# Patient Record
Sex: FEMALE | Race: WHITE | NOT HISPANIC OR LATINO | Employment: OTHER | URBAN - METROPOLITAN AREA
[De-identification: names, ages, dates, MRNs, and addresses within clinical notes are randomized per-mention and may not be internally consistent; named-entity substitution may affect disease eponyms.]

---

## 2017-01-10 ENCOUNTER — HOSPITAL ENCOUNTER (OUTPATIENT)
Dept: RADIOLOGY | Facility: HOSPITAL | Age: 82
Discharge: HOME | End: 2017-01-10
Attending: ORTHOPAEDIC SURGERY
Payer: MEDICARE

## 2017-01-10 ENCOUNTER — ALLSCRIPTS OFFICE VISIT (OUTPATIENT)
Dept: OTHER | Facility: OTHER | Age: 82
End: 2017-01-10

## 2017-01-10 ENCOUNTER — TRANSCRIBE ORDERS (OUTPATIENT)
Dept: ADMINISTRATIVE | Facility: HOSPITAL | Age: 82
End: 2017-01-10

## 2017-01-10 DIAGNOSIS — M16.11 PRIMARY OSTEOARTHRITIS OF RIGHT HIP: ICD-10-CM

## 2017-01-10 DIAGNOSIS — M25.551 PAIN IN RIGHT HIP: ICD-10-CM

## 2017-01-10 DIAGNOSIS — M25.551 RIGHT HIP PAIN: Primary | ICD-10-CM

## 2017-01-10 PROCEDURE — 73502 X-RAY EXAM HIP UNI 2-3 VIEWS: CPT

## 2017-01-10 PROCEDURE — 72100 X-RAY EXAM L-S SPINE 2/3 VWS: CPT

## 2017-01-31 ENCOUNTER — HOSPITAL ENCOUNTER (OUTPATIENT)
Dept: RADIOLOGY | Facility: HOSPITAL | Age: 82
Discharge: HOME | End: 2017-01-31
Payer: MEDICARE

## 2017-01-31 DIAGNOSIS — M16.11 PRIMARY OSTEOARTHRITIS OF RIGHT HIP: ICD-10-CM

## 2017-01-31 PROCEDURE — 20610 DRAIN/INJ JOINT/BURSA W/O US: CPT

## 2017-01-31 PROCEDURE — 77002 NEEDLE LOCALIZATION BY XRAY: CPT

## 2017-01-31 RX ORDER — LIDOCAINE HYDROCHLORIDE 10 MG/ML
20 INJECTION, SOLUTION INFILTRATION; PERINEURAL
Status: COMPLETED | OUTPATIENT
Start: 2017-01-31 | End: 2017-01-31

## 2017-01-31 RX ORDER — METHYLPREDNISOLONE ACETATE 80 MG/ML
80 INJECTION, SUSPENSION INTRA-ARTICULAR; INTRALESIONAL; INTRAMUSCULAR; SOFT TISSUE
Status: COMPLETED | OUTPATIENT
Start: 2017-01-31 | End: 2017-01-31

## 2017-01-31 RX ORDER — BUPIVACAINE HYDROCHLORIDE 2.5 MG/ML
30 INJECTION, SOLUTION EPIDURAL; INFILTRATION; INTRACAUDAL
Status: COMPLETED | OUTPATIENT
Start: 2017-01-31 | End: 2017-01-31

## 2017-01-31 RX ORDER — SODIUM BICARBONATE 42 MG/ML
2.4 INJECTION, SOLUTION INTRAVENOUS
Status: COMPLETED | OUTPATIENT
Start: 2017-01-31 | End: 2017-01-31

## 2017-01-31 RX ADMIN — LIDOCAINE HYDROCHLORIDE 2 ML: 10 INJECTION, SOLUTION INFILTRATION; PERINEURAL at 13:25

## 2017-01-31 RX ADMIN — BUPIVACAINE HYDROCHLORIDE 2 ML: 2.5 INJECTION, SOLUTION EPIDURAL; INFILTRATION; INTRACAUDAL at 13:25

## 2017-01-31 RX ADMIN — SODIUM BICARBONATE 2.4 MEQ: 42 SOLUTION INTRAVENOUS at 13:25

## 2017-01-31 RX ADMIN — METHYLPREDNISOLONE ACETATE 80 MG: 80 INJECTION, SUSPENSION INTRA-ARTICULAR; INTRALESIONAL; INTRAMUSCULAR; SOFT TISSUE at 13:25

## 2017-01-31 RX ADMIN — IOHEXOL 3 ML: 300 INJECTION, SOLUTION INTRAVENOUS at 13:25

## 2018-01-13 VITALS
BODY MASS INDEX: 23.56 KG/M2 | HEIGHT: 64 IN | WEIGHT: 138 LBS | SYSTOLIC BLOOD PRESSURE: 149 MMHG | DIASTOLIC BLOOD PRESSURE: 78 MMHG | HEART RATE: 82 BPM

## 2019-07-09 ENCOUNTER — APPOINTMENT (EMERGENCY)
Dept: RADIOLOGY | Facility: HOSPITAL | Age: 84
End: 2019-07-09
Payer: MEDICARE

## 2019-07-09 ENCOUNTER — APPOINTMENT (OUTPATIENT)
Dept: RADIOLOGY | Facility: HOSPITAL | Age: 84
End: 2019-07-09
Payer: MEDICARE

## 2019-07-09 ENCOUNTER — HOSPITAL ENCOUNTER (OUTPATIENT)
Facility: HOSPITAL | Age: 84
Setting detail: OBSERVATION
Discharge: HOME | End: 2019-07-09
Attending: EMERGENCY MEDICINE | Admitting: INTERNAL MEDICINE
Payer: MEDICARE

## 2019-07-09 VITALS
BODY MASS INDEX: 24.45 KG/M2 | SYSTOLIC BLOOD PRESSURE: 138 MMHG | OXYGEN SATURATION: 97 % | DIASTOLIC BLOOD PRESSURE: 66 MMHG | HEART RATE: 94 BPM | RESPIRATION RATE: 20 BRPM | TEMPERATURE: 98.4 F | HEIGHT: 63 IN

## 2019-07-09 DIAGNOSIS — R07.89 CHEST TIGHTNESS: Primary | ICD-10-CM

## 2019-07-09 DIAGNOSIS — I10 ESSENTIAL HYPERTENSION: ICD-10-CM

## 2019-07-09 DIAGNOSIS — R42 LIGHTHEADEDNESS: ICD-10-CM

## 2019-07-09 PROBLEM — K21.9 GERD (GASTROESOPHAGEAL REFLUX DISEASE): Status: ACTIVE | Noted: 2019-07-09

## 2019-07-09 LAB
ALBUMIN SERPL BCP-MCNC: 4 G/DL (ref 3.5–5)
ALP SERPL-CCNC: 99 U/L (ref 46–116)
ALT SERPL W P-5'-P-CCNC: 18 U/L (ref 12–78)
ANION GAP SERPL CALCULATED.3IONS-SCNC: 9 MMOL/L (ref 4–13)
AST SERPL W P-5'-P-CCNC: 14 U/L (ref 5–45)
ATRIAL RATE: 87 BPM
ATRIAL RATE: 88 BPM
ATRIAL RATE: 89 BPM
BASOPHILS # BLD AUTO: 0.07 THOUSANDS/ΜL (ref 0–0.1)
BASOPHILS NFR BLD AUTO: 1 % (ref 0–1)
BILIRUB SERPL-MCNC: 0.39 MG/DL (ref 0.2–1)
BUN SERPL-MCNC: 18 MG/DL (ref 5–25)
CALCIUM SERPL-MCNC: 9.5 MG/DL (ref 8.3–10.1)
CHLORIDE SERPL-SCNC: 105 MMOL/L (ref 100–108)
CO2 SERPL-SCNC: 25 MMOL/L (ref 21–32)
CREAT SERPL-MCNC: 0.9 MG/DL (ref 0.6–1.3)
EOSINOPHIL # BLD AUTO: 0.29 THOUSAND/ΜL (ref 0–0.61)
EOSINOPHIL NFR BLD AUTO: 2 % (ref 0–6)
ERYTHROCYTE [DISTWIDTH] IN BLOOD BY AUTOMATED COUNT: 12.4 % (ref 11.6–15.1)
GFR SERPL CREATININE-BSD FRML MDRD: 59 ML/MIN/1.73SQ M
GLUCOSE SERPL-MCNC: 116 MG/DL (ref 65–140)
HCT VFR BLD AUTO: 41.1 % (ref 34.8–46.1)
HGB BLD-MCNC: 13.7 G/DL (ref 11.5–15.4)
IMM GRANULOCYTES # BLD AUTO: 0.06 THOUSAND/UL (ref 0–0.2)
IMM GRANULOCYTES NFR BLD AUTO: 0 % (ref 0–2)
LYMPHOCYTES # BLD AUTO: 1.56 THOUSANDS/ΜL (ref 0.6–4.47)
LYMPHOCYTES NFR BLD AUTO: 12 % (ref 14–44)
MCH RBC QN AUTO: 31.1 PG (ref 26.8–34.3)
MCHC RBC AUTO-ENTMCNC: 33.3 G/DL (ref 31.4–37.4)
MCV RBC AUTO: 93 FL (ref 82–98)
MONOCYTES # BLD AUTO: 0.6 THOUSAND/ΜL (ref 0.17–1.22)
MONOCYTES NFR BLD AUTO: 5 % (ref 4–12)
NEUTROPHILS # BLD AUTO: 10.9 THOUSANDS/ΜL (ref 1.85–7.62)
NEUTS SEG NFR BLD AUTO: 80 % (ref 43–75)
NRBC BLD AUTO-RTO: 0 /100 WBCS
P AXIS: 27 DEGREES
P AXIS: 52 DEGREES
P AXIS: 53 DEGREES
PLATELET # BLD AUTO: 250 THOUSANDS/UL (ref 149–390)
PMV BLD AUTO: 10.1 FL (ref 8.9–12.7)
POTASSIUM SERPL-SCNC: 3.5 MMOL/L (ref 3.5–5.3)
PR INTERVAL: 184 MS
PR INTERVAL: 184 MS
PR INTERVAL: 188 MS
PROT SERPL-MCNC: 7.9 G/DL (ref 6.4–8.2)
QRS AXIS: -15 DEGREES
QRS AXIS: -9 DEGREES
QRS AXIS: 1 DEGREES
QRSD INTERVAL: 80 MS
QRSD INTERVAL: 84 MS
QRSD INTERVAL: 86 MS
QT INTERVAL: 344 MS
QT INTERVAL: 364 MS
QT INTERVAL: 370 MS
QTC INTERVAL: 416 MS
QTC INTERVAL: 442 MS
QTC INTERVAL: 445 MS
RBC # BLD AUTO: 4.41 MILLION/UL (ref 3.81–5.12)
SODIUM SERPL-SCNC: 139 MMOL/L (ref 136–145)
T WAVE AXIS: 11 DEGREES
T WAVE AXIS: 13 DEGREES
T WAVE AXIS: 26 DEGREES
TROPONIN I SERPL-MCNC: <0.02 NG/ML
VENTRICULAR RATE: 87 BPM
VENTRICULAR RATE: 88 BPM
VENTRICULAR RATE: 89 BPM
WBC # BLD AUTO: 13.48 THOUSAND/UL (ref 4.31–10.16)

## 2019-07-09 PROCEDURE — 71250 CT THORAX DX C-: CPT

## 2019-07-09 PROCEDURE — 1124F ACP DISCUSS-NO DSCNMKR DOCD: CPT | Performed by: EMERGENCY MEDICINE

## 2019-07-09 PROCEDURE — 99284 EMERGENCY DEPT VISIT MOD MDM: CPT | Performed by: EMERGENCY MEDICINE

## 2019-07-09 PROCEDURE — 80053 COMPREHEN METABOLIC PANEL: CPT | Performed by: EMERGENCY MEDICINE

## 2019-07-09 PROCEDURE — 99285 EMERGENCY DEPT VISIT HI MDM: CPT

## 2019-07-09 PROCEDURE — 84484 ASSAY OF TROPONIN QUANT: CPT | Performed by: STUDENT IN AN ORGANIZED HEALTH CARE EDUCATION/TRAINING PROGRAM

## 2019-07-09 PROCEDURE — 85025 COMPLETE CBC W/AUTO DIFF WBC: CPT | Performed by: EMERGENCY MEDICINE

## 2019-07-09 PROCEDURE — 71046 X-RAY EXAM CHEST 2 VIEWS: CPT

## 2019-07-09 PROCEDURE — 84484 ASSAY OF TROPONIN QUANT: CPT | Performed by: EMERGENCY MEDICINE

## 2019-07-09 PROCEDURE — 36415 COLL VENOUS BLD VENIPUNCTURE: CPT

## 2019-07-09 PROCEDURE — 93005 ELECTROCARDIOGRAM TRACING: CPT

## 2019-07-09 PROCEDURE — 93010 ELECTROCARDIOGRAM REPORT: CPT | Performed by: INTERNAL MEDICINE

## 2019-07-09 RX ORDER — PANTOPRAZOLE SODIUM 20 MG/1
TABLET, DELAYED RELEASE ORAL
Status: COMPLETED
Start: 2019-07-09 | End: 2019-07-09

## 2019-07-09 RX ORDER — LABETALOL 20 MG/4 ML (5 MG/ML) INTRAVENOUS SYRINGE
Status: COMPLETED
Start: 2019-07-09 | End: 2019-07-09

## 2019-07-09 RX ORDER — ACETAMINOPHEN 325 MG/1
650 TABLET ORAL EVERY 6 HOURS PRN
Status: DISCONTINUED | OUTPATIENT
Start: 2019-07-09 | End: 2019-07-09 | Stop reason: HOSPADM

## 2019-07-09 RX ORDER — PANTOPRAZOLE SODIUM 20 MG/1
20 TABLET, DELAYED RELEASE ORAL
Status: DISCONTINUED | OUTPATIENT
Start: 2019-07-09 | End: 2019-07-09 | Stop reason: HOSPADM

## 2019-07-09 RX ORDER — AMLODIPINE BESYLATE 5 MG/1
5 TABLET ORAL DAILY
Status: DISCONTINUED | OUTPATIENT
Start: 2019-07-09 | End: 2019-07-09

## 2019-07-09 RX ORDER — AMLODIPINE BESYLATE 5 MG/1
5 TABLET ORAL DAILY
Status: DISCONTINUED | OUTPATIENT
Start: 2019-07-09 | End: 2019-07-09 | Stop reason: HOSPADM

## 2019-07-09 RX ORDER — METOPROLOL TARTRATE 5 MG/5ML
5 INJECTION INTRAVENOUS ONCE
Status: COMPLETED | OUTPATIENT
Start: 2019-07-09 | End: 2019-07-09

## 2019-07-09 RX ORDER — ACETAMINOPHEN 325 MG/1
975 TABLET ORAL ONCE
Status: COMPLETED | OUTPATIENT
Start: 2019-07-09 | End: 2019-07-09

## 2019-07-09 RX ORDER — LORAZEPAM 0.5 MG/1
0.5 TABLET ORAL 2 TIMES DAILY
COMMUNITY
End: 2020-10-23 | Stop reason: SDUPTHER

## 2019-07-09 RX ORDER — LABETALOL 20 MG/4 ML (5 MG/ML) INTRAVENOUS SYRINGE
5 ONCE
Status: COMPLETED | OUTPATIENT
Start: 2019-07-09 | End: 2019-07-09

## 2019-07-09 RX ORDER — ATORVASTATIN CALCIUM 20 MG/1
20 TABLET, FILM COATED ORAL
Status: DISCONTINUED | OUTPATIENT
Start: 2019-07-09 | End: 2019-07-09 | Stop reason: HOSPADM

## 2019-07-09 RX ORDER — LATANOPROST 50 UG/ML
1 SOLUTION/ DROPS OPHTHALMIC ONCE
Status: DISCONTINUED | OUTPATIENT
Start: 2019-07-09 | End: 2019-07-09 | Stop reason: HOSPADM

## 2019-07-09 RX ORDER — AMLODIPINE BESYLATE 10 MG/1
10 TABLET ORAL DAILY
Qty: 30 TABLET | Refills: 0 | Status: SHIPPED | OUTPATIENT
Start: 2019-07-09 | End: 2019-09-03 | Stop reason: SDUPTHER

## 2019-07-09 RX ORDER — AMLODIPINE BESYLATE 5 MG/1
5 TABLET ORAL DAILY
Status: ON HOLD | COMMUNITY
End: 2019-07-09 | Stop reason: SDUPTHER

## 2019-07-09 RX ORDER — ACETAMINOPHEN 325 MG/1
TABLET ORAL
Status: COMPLETED
Start: 2019-07-09 | End: 2019-07-09

## 2019-07-09 RX ORDER — MAGNESIUM HYDROXIDE/ALUMINUM HYDROXICE/SIMETHICONE 120; 1200; 1200 MG/30ML; MG/30ML; MG/30ML
SUSPENSION ORAL
Status: COMPLETED
Start: 2019-07-09 | End: 2019-07-09

## 2019-07-09 RX ORDER — ATENOLOL 25 MG/1
25 TABLET ORAL DAILY
COMMUNITY
End: 2020-12-24 | Stop reason: SDUPTHER

## 2019-07-09 RX ORDER — LORAZEPAM 0.5 MG/1
0.5 TABLET ORAL 2 TIMES DAILY
Status: DISCONTINUED | OUTPATIENT
Start: 2019-07-09 | End: 2019-07-09 | Stop reason: HOSPADM

## 2019-07-09 RX ORDER — MAGNESIUM HYDROXIDE/ALUMINUM HYDROXICE/SIMETHICONE 120; 1200; 1200 MG/30ML; MG/30ML; MG/30ML
30 SUSPENSION ORAL ONCE
Status: COMPLETED | OUTPATIENT
Start: 2019-07-09 | End: 2019-07-09

## 2019-07-09 RX ADMIN — ACETAMINOPHEN 975 MG: 325 TABLET ORAL at 04:03

## 2019-07-09 RX ADMIN — LABETALOL 20 MG/4 ML (5 MG/ML) INTRAVENOUS SYRINGE 5 MG: at 05:49

## 2019-07-09 RX ADMIN — ENOXAPARIN SODIUM 40 MG: 40 INJECTION SUBCUTANEOUS at 08:48

## 2019-07-09 RX ADMIN — LORAZEPAM 0.5 MG: 0.5 TABLET ORAL at 06:12

## 2019-07-09 RX ADMIN — MAGNESIUM HYDROXIDE/ALUMINUM HYDROXICE/SIMETHICONE 30 ML: 120; 1200; 1200 SUSPENSION ORAL at 04:03

## 2019-07-09 RX ADMIN — LORAZEPAM 0.5 MG: 0.5 TABLET ORAL at 16:07

## 2019-07-09 RX ADMIN — METOPROLOL TARTRATE 5 MG: 5 INJECTION INTRAVENOUS at 16:25

## 2019-07-09 RX ADMIN — AMLODIPINE BESYLATE 5 MG: 5 TABLET ORAL at 08:48

## 2019-07-09 RX ADMIN — ACETAMINOPHEN 650 MG: 325 TABLET ORAL at 16:06

## 2019-07-09 RX ADMIN — PANTOPRAZOLE SODIUM 20 MG: 20 TABLET, DELAYED RELEASE ORAL at 05:48

## 2019-07-09 RX ADMIN — ATORVASTATIN CALCIUM 20 MG: 20 TABLET, FILM COATED ORAL at 16:06

## 2019-07-09 RX ADMIN — ALUMINUM HYDROXIDE, MAGNESIUM HYDROXIDE, AND SIMETHICONE 30 ML: 200; 200; 20 SUSPENSION ORAL at 04:03

## 2019-07-09 RX ADMIN — METOPROLOL TARTRATE 25 MG: 25 TABLET, FILM COATED ORAL at 08:48

## 2019-07-09 NOTE — PROGRESS NOTES
INTERNAL MEDICINE RESIDENCY SENIOR ADMISSION NOTE     Name: Major Arce   Age & Sex: 81 y o  female   MRN: [id number]  Unit/Bed#: [facility] 510-01   Encounter: [id number]  Primary Care Provider: Violetta Alarcon MD    Patient seen and examined  Reviewed H&P per Dr Sukumar Rivera  Agree with the assessment and plan with any exception/addition as noted below:    81-year-old female with past medical history significant for hypertension and GERD  Patient states she woke up in the middle of the night at 12 o'clock secondary to dizziness which she described as the room spinning  When asked if she had ever had this before she denied  Patient also stated she had some associated chest tightness that lasted for a couple minutes  Patient had no other associated symptoms such as diaphoresis, shortness of breath, or visual disturbances  Patient was admitted for ACS rule out although it seems main issue is dizziness at this time  Will do orthostatics vital signs and troponin x2 with EKG x2  Principal Problem:    Dizziness  Active Problems:    Chest tightness    HTN (hypertension)    GERD (gastroesophageal reflux disease)    Dizziness and chest tightness  · Patient states she felt like the room was spinning when she woke up at midnight    Patient also complained of some associated chest tightness  · While in the emergency department the patient was worked up for chest pain with EKG, chest x-ray, and troponin x1 all of which were negative  · Troponin x2 ordered; EKG with the troponin  · Orthostatic vital signs  · Telemetry monitoring  · Continue to monitor    Hypertension  · Patient's blood pressure elevated upon getting to the emergency department  · May be secondary to anxiety versus noncompliance with medications  · Labetalol 5 mg x1 dose  · Amlodipine 5 mg daily  · Metoprolol tartrate 25 mg q 12 hours  · Patient was also noted to be on atenolol, patient was unsure if she was taking this medication but as patient already has beta-blocker on will not continue atenolol    GERD  · Omeprazole 20 mg daily      Code Status: Level 1 - Full Code  Admission Status: OBSERVATION  Disposition: Patient requires Med/Surg with Telemetry  Expected Length of Stay: 1-2 days

## 2019-07-09 NOTE — SOCIAL WORK
Cm met with pt at [facility] to discuss cm role and possible d/c needs  Pt report that she lives alone in a 1 SH with one step to get in  Pt has steps to go up to upper level but has a stair glide to go up on the second floor  Pt reports that she is independent with all ADL' s and uses a walker to ambulate  Pt denies VNA or admission to skilled facilities  Pt denies hx of drug/ETOH or mental health dx and any inpatient stays  Pt uses [facility] in [facility] [place], [place]  PCP is Beto Kimbrough with  [facility]  Pt is retired and does not drive  Daughter will transport at d/c  Emergency contact: Liliana Kaminski [phone]    CM reviewed d/c planning process including the following: identifying help at home, patient preference for d/c planning needs, Discharge Lounge, [facility] Meds to Bed program, availability of treatment team to discuss questions or concerns patient and/or family may have regarding understanding medications and recognizing signs and symptoms once discharged  CM also encouraged patient to follow up with all recommended appointments after discharge  Patient advised of importance for patient and family to participate in managing patients medical well being

## 2019-07-09 NOTE — ED PROVIDER NOTES
History  Chief Complaint   Patient presents with    Chest Pain     Pt c/o chest tightness and dizziness that started 2 hours ago  Patient is an 27-year-old female with a history of high blood pressure, anxiety presents with 2 hours chest tightness, dizziness, and headache  Symptoms started suddenly about 2 hours global patient was lying in bed  She noted dizziness like the room was spinning  No clear inciting event or ameliorating/exacerbating factors  Associated with mild chest tightness and mild shortness of breath  Symptoms gradually resolved and patient now only complains of mild headache  Reports similar symptoms in the past, which were attributed to anxiety  Denies fever/chills, cough, nausea/vomiting, leg swelling  Prior to Admission Medications   Prescriptions Last Dose Informant Patient Reported? Taking? LORazepam (ATIVAN) 0 5 mg tablet 7/8/2019 at Unknown time  Yes Yes   Sig: Take 0 5 mg by mouth 2 (two) times a day   Latanoprost 0 005 % EMUL 7/8/2019 at Unknown time  Yes Yes   OMEPRAZOLE PO 7/8/2019 at Unknown time  Yes Yes   Sig: Take 20 mg by mouth daily   amLODIPine (NORVASC) 5 mg tablet 7/8/2019 at Unknown time  Yes Yes   Sig: Take 5 mg by mouth daily   atenolol (TENORMIN) 25 mg tablet Unknown at Unknown time  Yes No   Sig: Take 25 mg by mouth daily   metoprolol tartrate (LOPRESSOR) 25 mg tablet 7/8/2019 at Unknown time  Yes Yes      Facility-Administered Medications: None       Past Medical History:   Diagnosis Date    Anxiety     HLD (hyperlipidemia)     Hypertension        Past Surgical History:   Procedure Laterality Date    APPENDECTOMY      CHOLECYSTECTOMY      HYSTERECTOMY         History reviewed  No pertinent family history  I have reviewed and agree with the history as documented      Social History     Tobacco Use    Smoking status: Former Smoker    Smokeless tobacco: Never Used   Substance Use Topics    Alcohol use: Not Currently    Drug use: Not Currently Review of Systems   Constitutional: Negative for chills, fatigue and fever  HENT: Negative for congestion and sore throat  Eyes: Negative for visual disturbance  Respiratory: Positive for chest tightness  Negative for cough and shortness of breath  Cardiovascular: Negative for chest pain  Gastrointestinal: Negative for abdominal pain, diarrhea, nausea and vomiting  Endocrine: Negative for polyuria  Genitourinary: Negative for difficulty urinating and dysuria  Musculoskeletal: Negative for arthralgias  Skin: Negative for rash  Neurological: Positive for dizziness and headaches  Negative for light-headedness  All other systems reviewed and are negative  Physical Exam  ED Triage Vitals   Temperature Pulse Respirations Blood Pressure SpO2   07/09/19 0238 07/09/19 0238 07/09/19 0238 07/09/19 0238 07/09/19 0238   97 9 °F (36 6 °C) 93 18 (!) 200/85 94 %      Temp Source Heart Rate Source Patient Position - Orthostatic VS BP Location FiO2 (%)   07/09/19 0238 07/09/19 0238 07/09/19 0238 07/09/19 0238 --   Oral Monitor Lying Right arm       Pain Score       07/09/19 0340       5             Orthostatic Vital Signs  Vitals:    07/09/19 1107 07/09/19 1109 07/09/19 1500 07/09/19 1748   BP: (!) 184/86 170/79 (!) 188/85 138/66   Pulse: 79 93 91 94   Patient Position - Orthostatic VS: Sitting Standing         Physical Exam   Constitutional: She is oriented to person, place, and time  She appears well-developed and well-nourished  No distress  HENT:   Head: Normocephalic and atraumatic  Eyes: Pupils are equal, round, and reactive to light  EOM are normal    Neck: Normal range of motion  Neck supple  Cardiovascular: Normal rate, regular rhythm and normal heart sounds  Pulmonary/Chest: Effort normal and breath sounds normal  No respiratory distress  Abdominal: Soft  Bowel sounds are normal  There is no tenderness  Musculoskeletal: Normal range of motion     Neurological: She is alert and oriented to person, place, and time  Skin: Skin is warm and dry  Psychiatric: She has a normal mood and affect  Nursing note and vitals reviewed        ED Medications  Medications   acetaminophen (TYLENOL) tablet 975 mg (975 mg Oral Given 7/9/19 0403)   aluminum-magnesium hydroxide-simethicone (MYLANTA) 200-200-20 mg/5 mL oral suspension 30 mL (30 mL Oral Given 7/9/19 0403)   Labetalol HCl (NORMODYNE) injection 5 mg (5 mg Intravenous Given 7/9/19 0549)   metoprolol (LOPRESSOR) injection 5 mg (5 mg Intravenous Given 7/9/19 1625)       Diagnostic Studies  Results Reviewed     Procedure Component Value Units Date/Time    Troponin I [[id number]]  (Normal) Collected:  07/09/19 0235    Lab Status:  Final result Specimen:  Blood from Arm, Left Updated:  07/09/19 0308     Troponin I <0 02 ng/mL     Comprehensive metabolic panel [[id number]] Collected:  07/09/19 0235    Lab Status:  Final result Specimen:  Blood from Arm, Left Updated:  07/09/19 0304     Sodium 139 mmol/L      Potassium 3 5 mmol/L      Chloride 105 mmol/L      CO2 25 mmol/L      ANION GAP 9 mmol/L      BUN 18 mg/dL      Creatinine 0 90 mg/dL      Glucose 116 mg/dL      Calcium 9 5 mg/dL      AST 14 U/L      ALT 18 U/L      Alkaline Phosphatase 99 U/L      Total Protein 7 9 g/dL      Albumin 4 0 g/dL      Total Bilirubin 0 39 mg/dL      eGFR 59 ml/min/1 73sq m     Narrative:       Arlene guidelines for Chronic Kidney Disease (CKD):     Stage 1 with normal or high GFR (GFR > 90 mL/min/1 73 square meters)    Stage 2 Mild CKD (GFR = 60-89 mL/min/1 73 square meters)    Stage 3A Moderate CKD (GFR = 45-59 mL/min/1 73 square meters)    Stage 3B Moderate CKD (GFR = 30-44 mL/min/1 73 square meters)    Stage 4 Severe CKD (GFR = 15-29 mL/min/1 73 square meters)    Stage 5 End Stage CKD (GFR <15 mL/min/1 73 square meters)  Note: GFR calculation is accurate only with a steady state creatinine    CBC and differential [[id number]] (Abnormal) Collected:  07/09/19 0235    Lab Status:  Final result Specimen:  Blood from Arm, Left Updated:  07/09/19 0249     WBC 13 48 Thousand/uL      RBC 4 41 Million/uL      Hemoglobin 13 7 g/dL      Hematocrit 41 1 %      MCV 93 fL      MCH 31 1 pg      MCHC 33 3 g/dL      RDW 12 4 %      MPV 10 1 fL      Platelets 054 Thousands/uL      nRBC 0 /100 WBCs      Neutrophils Relative 80 %      Immat GRANS % 0 %      Lymphocytes Relative 12 %      Monocytes Relative 5 %      Eosinophils Relative 2 %      Basophils Relative 1 %      Neutrophils Absolute 10 90 Thousands/µL      Immature Grans Absolute 0 06 Thousand/uL      Lymphocytes Absolute 1 56 Thousands/µL      Monocytes Absolute 0 60 Thousand/µL      Eosinophils Absolute 0 29 Thousand/µL      Basophils Absolute 0 07 Thousands/µL                  CT chest wo contrast   Final Result by Parmjit Rodriguez MD (07/10 5106)      1  No acute cardiopulmonary disease  2   The mediastinal mass reported on x-ray is a moderate-sized hiatal hernia        Workstation performed: [id number]         XR chest 2 views   ED Interpretation by Lu Pallas, MD (07/09 1908)   No acute abnormality      Final Result by  (07/15 1365)   Addendum 1 of 1 by Parmjit Rodriguez MD (07/09 7857)   ADDENDUM:      I personally discussed this study with Audra Lassiter on 7/9/2019 at 9:32 AM       Final            Procedures  Procedures        ED Course         HEART Risk Score      Most Recent Value   History  1 Filed at: 07/09/2019 0314   ECG  0 Filed at: 07/09/2019 0308   Age  2 Filed at: 07/09/2019 0308   Risk Factors  1 Filed at: 07/09/2019 0308   Troponin  --   Heart Score Risk Calculator   History  1 Filed at: 07/09/2019 0314   ECG  0 Filed at: 07/09/2019 0308   Age  2 Filed at: 07/09/2019 0308   Risk Factors  1 Filed at: 07/09/2019 0308   Troponin  --   HEART Score  --   HEART Score  --                            MDM  Number of Diagnoses or Management Options  Chest tightness: Lightheadedness:   Diagnosis management comments: Patient is an 81-year-old female with a history of high blood pressure, anxiety presents with 2 hours chest tightness, dizziness, and headache  Exam unremarkable for age  Chest pain workup obtained and is benign  Clinically most concerning for anxiety  However  heart score of 4, will admit for further observation  Disposition  Final diagnoses:   Chest tightness   Lightheadedness     Time reflects when diagnosis was documented in both MDM as applicable and the Disposition within this note     Time User Action Codes Description Comment    7/9/2019  3:14 AM Brandy Flaherty Add [R07 89] Chest tightness     7/9/2019  3:14 AM Milena Martínez Dobbsray Add [R42] Lightheadedness     7/9/2019  4:16 PM Edmar, [address] [place] [address] Essential hypertension       ED Disposition     ED Disposition Condition Date/Time Comment    Admit Stable Tue Jul 9, 2019  4:26 AM Case was discussed with SOD and the patient's admission status was agreed to be Admission Status: observation status to the service of Dr Coleman Mcfarlane           Follow-up Information     Follow up With Specialties Details Why Contact Info    Colletta Au, MD Internal Medicine Schedule an appointment as soon as possible for a visit in 1 week(s)  [address] [facility]  [phone]            Discharge Medication List as of 7/9/2019  4:18 PM      CONTINUE these medications which have CHANGED    Details   amLODIPine (NORVASC) 10 mg tablet Take 1 tablet (10 mg total) by mouth daily, Starting Tue 7/9/2019, Normal         CONTINUE these medications which have NOT CHANGED    Details   Latanoprost 0 005 % EMUL Starting Thu 7/12/2018, Historical Med      LORazepam (ATIVAN) 0 5 mg tablet Take 0 5 mg by mouth 2 (two) times a day, Historical Med      metoprolol tartrate (LOPRESSOR) 25 mg tablet Starting Mon 7/16/2018, Historical Med      OMEPRAZOLE PO Take 20 mg by mouth daily, Historical Med      atenolol (TENORMIN) 25 mg tablet Take 25 mg by mouth daily, Historical Med           Outpatient Discharge Orders   Discharge Diet     Activity as tolerated       ED Provider  Attending physically available and evaluated Ana Ballard I managed the patient along with the ED Attending      Electronically Signed by         Swapna Wang MD  07/15/19 7505

## 2019-07-09 NOTE — UTILIZATION REVIEW
Initial Clinical Review    Admission: Date/Time/Statement: N/A @ N/A     Orders Placed This Encounter   Procedures    Place in Observation (expected length of stay for this patient is less than two midnights)     Standing Status:   Standing     Number of Occurrences:   1     Order Specific Question:   Admitting Physician     Answer:   Myrna Goodwin     Order Specific Question:   Level of Care     Answer:   Med Surg [16]     ED Arrival Information     Expected Arrival Acuity Means of Arrival Escorted By Service Admission Type    - 7/9/2019 02:17 Urgent Ambulance Family Member General Medicine Urgent    Arrival Complaint     Chief Complaint   Patient presents with    Chest Pain     Pt c/o chest tightness and dizziness that started 2 hours ago  Chief complaint: Dizziness     History of Present Illness      Niya Batista is a 81 y o  female with a past medical history significant for hypertension and hyperlipidemia  Patient is a poor historian and forgetful  Patient came into the ED after she woke up at midnight with dizziness  She said that she felt like the room was spinning  She also complained of a headache that was resolved with Tylenol  She has never had an episode like this before        Review of Systems       Respiratory: Positive for chest tightness    Assessment and Plan      1) Dizziness  Patient woke up middle night feeling dizzy  Pending orthostatic blood pressures     2)Chest tightness   the patient says that she experiences chest tightness every now and then; however very mild  ACS rule out   troponins x3  with EKG q3   echo if deemed necessary     3)  Hypertension   patient's blood pressure consistently elevated; SBP more than 180  metoprolol 25 mg ordered   amlodipine 5 mg ordered   labetalol 5 mg p r n      ED Triage Vitals   Temperature Pulse Respirations Blood Pressure SpO2   07/09/19 0238 07/09/19 0238 07/09/19 0238 07/09/19 0238 07/09/19 0238   97 9 °F (36 6 °C) 93 18 (!) 200/85 94 %      Temp Source Heart Rate Source Patient Position - Orthostatic VS BP Location FiO2 (%)   07/09/19 0238 07/09/19 0238 07/09/19 0238 07/09/19 0238 --   Oral Monitor Lying Right arm       Pain Score       07/09/19 0340       5        Wt Readings from Last 1 Encounters:   01/10/17 62 6 kg (138 lb)     Additional Vital Signs:  07/09/19 0700  98 °F (36 7 °C)  86  18  147/70  96 %    07/09/19 0527  97 9 °F (36 6 °C)  91  16  182/84Abnormal   --    07/09/19 0345  --  90  17  187/84Abnormal   95 %    07/09/19 0330  --  94  22  183/80Abnormal   96 %      Pertinent Labs/Diagnostic Test Results:   Results from last 7 days   Lab Units 07/09/19  0235   WBC Thousand/uL 13 48*   HEMOGLOBIN g/dL 13 7   HEMATOCRIT % 41 1   PLATELETS Thousands/uL 250   NEUTROS ABS Thousands/µL 10 90*     Results from last 7 days   Lab Units 07/09/19  0235   SODIUM mmol/L 139   POTASSIUM mmol/L 3 5   CHLORIDE mmol/L 105   CO2 mmol/L 25   ANION GAP mmol/L 9   BUN mg/dL 18   CREATININE mg/dL 0 90   EGFR ml/min/1 73sq m 59   CALCIUM mg/dL 9 5     Results from last 7 days   Lab Units 07/09/19  0235   GLUCOSE RANDOM mg/dL 116       Results from last 7 days   Lab Units 07/09/19  0908 07/09/19  0235 07/09/19   TROPONIN I ng/mL <0 02 <0 02 <0 02     ED Treatment:   Medication Administration from 07/09/2019 0217 to 07/09/2019 5910       Order Dose Route Action     acetaminophen (TYLENOL) tablet 975 mg 975 mg Oral Given     aluminum-magnesium hydroxide-simethicone (MYLANTA) 200-200-20 mg/5 mL oral suspension 30 mL 30 mL Oral Given     Past Medical History:   Diagnosis Date    Anxiety     HLD (hyperlipidemia)     Hypertension      Admitting Diagnosis: Lightheadedness [R42]  Chest tightness [R07 89]  Chest pain [R07 9]    Age/Sex: 81 y o  female    Admission Orders:  TELEMETRY  SERIAL EKG + TROPONIN X 3    Current Facility-Administered Medications:  acetaminophen 650 mg Oral Q6H PRN   amLODIPine 5 mg Oral Daily   atorvastatin 20 mg Oral Daily With Dinner   enoxaparin 40 mg Subcutaneous Daily   latanoprost 1 drop Both Eyes Once   LORazepam 0 5 mg Oral BID   metoprolol tartrate 25 mg Oral Q12H [address]   pantoprazole 20 mg Oral Early Morning     IP CONSULT TO CASE MANAGEMENT    Network Utilization Review Department  Phone: [phone]; Fax [phone]  [email or web address]  org  ATTENTION: Please call with any questions or concerns to [phone]  and carefully listen to the prompts so that you are directed to the right person  Send all requests for admission clinical reviews, approved or denied determinations and any other requests to fax [phone]   All voicemails are confidential

## 2019-07-09 NOTE — DISCHARGE SUMMARY
INTERNAL MEDICINE RESIDENCY DISCHARGE SUMMARY     Baylee Mathis   81 y o  female  MRN: [id number]  Room/Bed: [place] [address]/[place] [address] MED SURG/SD   Encounter: [id number]    Principal Problem:    Dizziness  Active Problems:    Chest tightness    HTN (hypertension)    GERD (gastroesophageal reflux disease)        [address] COURSE     Patient is a 25-year-old female admitted for dizziness and chest pain/ACS rule out  On the morning of discharge patient reported that her symptoms significantly improved  Her dizziness resolved and she did not have any chest tightness/chest pain  She was able to ambulate around the room without difficulty  She had negative Romberg, negative [place]-Hallpike, and negative nystagmus  Patient's troponins negative x3 without acute ischemic changes noted on EKG  Chest x-ray obtained during admission showed a medial spinal mass likely hiatal hernia  On radiology recommendation a CT chest without contrast was obtained (patient did not want contrast)  Patient was discharged in stable condition on 07/09/2019 with instructions to follow up with primary care provider and possible ENT referral   Patient's amlodipine was increased to 10 mg daily prior to discharge  Results of CT chest pending on discharge  Will require outpatient follow-up for the results  Admission Senior note per Dr Rogers Kras    "25-year-old female with past medical history significant for hypertension and GERD  Patient states she woke up in the middle of the night at 12 o'clock secondary to dizziness which she described as the room spinning  When asked if she had ever had this before she denied  Patient also stated she had some associated chest tightness that lasted for a couple minutes  Patient had no other associated symptoms such as diaphoresis, shortness of breath, or visual disturbances    Patient was admitted for ACS rule out although it seems main issue is dizziness at this time  Will do orthostatics vital signs and troponin x2 with EKG x2 "    Physical Exam   Constitutional: She is oriented to person, place, and time  She appears well-developed and well-nourished  No distress  HENT:   Head: Normocephalic and atraumatic  Right Ear: External ear normal    Left Ear: External ear normal    Nose: Nose normal    Eyes: Pupils are equal, round, and reactive to light  Conjunctivae and EOM are normal  Right eye exhibits no discharge  Left eye exhibits no discharge  No scleral icterus  Neck: No JVD present  No tracheal deviation present  Cardiovascular: Normal rate, regular rhythm, normal heart sounds and intact distal pulses  Exam reveals no gallop and no friction rub  No murmur heard  Pulmonary/Chest: Effort normal and breath sounds normal  No stridor  No respiratory distress  She has no wheezes  She has no rales  She exhibits no tenderness  Abdominal: Soft  Bowel sounds are normal  She exhibits no distension  There is no tenderness  There is no rebound and no guarding  Musculoskeletal: She exhibits no edema, tenderness or deformity  Neurological: She is alert and oriented to person, place, and time  No cranial nerve deficit or sensory deficit  She exhibits normal muscle tone  Coordination normal    Negative Romberg, negative [place]-Hallpike, negative nystagmus  Able to ambulate without difficulty   Skin: Skin is warm and dry  No rash noted  She is not diaphoretic  No erythema  No pallor  Vitals reviewed          DISCHARGE INFORMATION     PCP at Discharge: Maria Elena Esparza MD    Admitting Provider: Maria Elena Esparza MD  Admission Date: 7/9/2019    Discharge Provider: Maria Elena Esparza MD  Discharge Date: 7/9/2019    Discharge Disposition: Final discharge disposition not confirmed  Discharge Condition: stable  Discharge with Lines: no    Discharge Diet: regular diet  Activity Restrictions: none  Test Results Pending at Discharge: CT chest results    Discharge Diagnoses:  Principal Problem:    Dizziness  Active Problems:    Chest tightness    HTN (hypertension)    GERD (gastroesophageal reflux disease)  Resolved Problems:    * No resolved hospital problems  *      Consulting Providers:      Diagnostic & Therapeutic Procedures Performed:  Xr Chest 2 Views    Addendum Date: 7/9/2019    ADDENDUM: I personally discussed this study with Sofia Osborn on 7/9/2019 at 9:32 AM     Result Date: 7/9/2019  Impression: New middle mediastinal mass at the thoracoabdominal junction, likely hiatal hernia  Otherwise, clear lungs  Confirmation with contrast-enhanced chest CT could be obtained, as clinically warranted  I personally discussed this study with DR Man Tripathi on 7/9/2019 at 9:32 AM  Workstation performed: [id number]       Code Status: Level 1 - Full Code  Advance Directive & Living Will: <no information>  Power of :    POLST:      Medications:  Current Discharge Medication List        Current Discharge Medication List        Current Discharge Medication List      CONTINUE these medications which have NOT CHANGED    Details   amLODIPine (NORVASC) 5 mg tablet Take 5 mg by mouth daily      Latanoprost 0 005 % EMUL       LORazepam (ATIVAN) 0 5 mg tablet Take 0 5 mg by mouth 2 (two) times a day      metoprolol tartrate (LOPRESSOR) 25 mg tablet       OMEPRAZOLE PO Take 20 mg by mouth daily      atenolol (TENORMIN) 25 mg tablet Take 25 mg by mouth daily             Allergies: Allergies   Allergen Reactions    Penicillin G Benzathine        FOLLOW-UP     PCP Outpatient Follow-up:    Sherry Galeazzi, MD     Consulting Providers Follow-up:  none required     Active Issues Requiring Follow-up:     Patient Active Problem List   Diagnosis    Dizziness    Chest tightness    HTN (hypertension)    GERD (gastroesophageal reflux disease)         Discharge Statement:   I spent 30 minutes minutes discharging the patient  This time was spent on the day of discharge   I had direct contact with the patient on the day of discharge  Additional documentation is required if more than 30 minutes were spent on discharge  Portions of the record may have been created with voice recognition software  Occasional wrong word or "sound a like" substitutions may have occurred due to the inherent limitations of voice recognition software    Read the chart carefully and recognize, using context, where substitutions have occurred     ==  Araceli Jane, [facility] [facility]  Internal Medicine Resident PGY-3

## 2019-07-09 NOTE — ED ATTENDING ATTESTATION
I, [address] [place] [address], DO, saw and evaluated the patient  I have discussed the patient with the resident/non-physician practitioner and agree with the resident's/non-physician practitioner's findings, Plan of Care, and MDM as documented in the resident's/non-physician practitioner's note, except where noted  All available labs and Radiology studies were reviewed  I was present for key portions of any procedure(s) performed by the resident/non-physician practitioner and I was immediately available to provide assistance  At this point I agree with the current assessment done in the Emergency Department  I have conducted an independent evaluation of this patient a history and physical is as follows:      71-year-old female presents chest tightness and dizziness  Patient states tonight did not feel well and reports nausea as well as some chest tightness and heartburn  Patient states she also felt dizzy  She is feeling better in the emergency department  Has history of high blood pressure but denies previous MI  On exam-no acute distress, heart regular, lungs clear, abdomen soft nontender, no lower extremity edema or calf tenderness    Plan-cardiac evaluation, admit for observation given heart score 4    Critical Care Time  Procedures

## 2019-07-09 NOTE — H&P
INTERNAL MEDICINE HISTORY AND PHYSICAL  ED 09 SOD Team B     NAME: Abhilash Puente  AGE: 81 y o  SEX: female  : 1935   MRN: [id number]  ENCOUNTER: [id number]    DATE: 2019  TIME: 4:49 AM    Primary Care Physician: César Zavala MD  Admitting Provider: César Zavala MD    Chief complaint: Dizzyness    History of Present Illness     Abhilash Puente is a 81 y o  female with a past medical history significant for hypertension and hyperlipidemia  Patient is a poor historian and forgetful  Patient came into the ED after she woke up at midnight with dizziness  She said that she felt like the room was spinning  She also complained of a headache that was resolved with Tylenol  She has never had an episode like this before  She denies any shortness of breath, changes in vision, and blurry vision  Review of Systems   Review of Systems   Constitutional: Negative  HENT: Negative  Eyes: Negative  Respiratory: Positive for chest tightness  Negative for shortness of breath  Cardiovascular: Negative  Gastrointestinal: Negative  Endocrine: Negative  Past Medical History   Hypertension and hyperlipidemia    Past Surgical History   History reviewed  No pertinent surgical history  Social History     Patient is retired and lives apartment  She denies drinking, smoking, and illicit drug use  Family History   History reviewed  No pertinent family history  Medications Prior to Admission     Prior to Admission medications    Medication Sig Start Date End Date Taking?  Authorizing Provider   amLODIPine (NORVASC) 5 mg tablet Take 5 mg by mouth daily   Yes Historical Provider, MD   atenolol (TENORMIN) 25 mg tablet Take 25 mg by mouth daily   Yes Historical Provider, MD   Latanoprost 0 005 % EMUL  18  Yes Historical Provider, MD   LORazepam (ATIVAN) 0 5 mg tablet Take 0 5 mg by mouth 2 (two) times a day   Yes Historical Provider, MD   metoprolol tartrate (LOPRESSOR) 25 mg tablet 7/16/18  Yes Historical Provider, MD   OMEPRAZOLE PO Take 20 mg by mouth daily   Yes Historical Provider, MD       Allergies     Allergies   Allergen Reactions    Penicillin G Benzathine        Objective     Vitals:    07/09/19 0238 07/09/19 0330 07/09/19 0345   BP: (!) 200/85 (!) 183/80 (!) 187/84   BP Location: Right arm Right arm Right arm   Pulse: 93 94 90   Resp: 18 22 17   Temp: 97 9 °F (36 6 °C)     TempSrc: Oral     SpO2: 94% 96% 95%     There is no height or weight on file to calculate BMI  No intake or output data in the 24 hours ending 07/09/19 0449  Invasive Devices     Peripheral Intravenous Line            Peripheral IV 07/09/19 Left Antecubital less than 1 day                Physical Exam  GENERAL: Appears well-developed and well-nourished  Appears in no acute distress   HEENT: Normocephalic and atraumatic  No scleral icterus  PERRLA  EOMI B/L  No oropharyngeal edema  MM moist    NECK: Neck supple with no lymphadenopathy  Trachea midline  No JVD  CARDIOVASCULAR: S1 and S2 are present  Regular rate and rhythm  No murmurs, rubs, or gallops  RESPIRATORY: CTA B/L, no rales, rhonci or wheezes  Normal respiratory expansion  ABDOMINAL: Bowel sounds present in all 4 quadrants, non-tender, soft, non-distended  No organomegaly, rebound, or guarding  EXTREMITIES: 2+ DP and PT pulses bilaterally; no cyanosis, clubbing, edema  ROM intact  LE x4   MUSCULOSKELETAL: No joint tenderness, deformity or swelling, full range of motion without pain  NEUROLOGIC: Patient is alert and oriented to person, place, and time  No sensory or motor deficits  CN 2-12 intact  Plantars downgoing bilaterally  Speech fluent  SKIN: Skin is warm and dry  No skin lesions are present  No rashes  PSYCHIATRIC: Normal mood and affect     Lab Results: I have personally reviewed pertinent reports      Cardiac Studies:   Results from last 7 days   Lab Units 07/09/19  0235   TROPONIN I ng/mL <0 02       Imaging: I have personally reviewed pertinent films in PACS  No results found  Microbiology: cultures obtained in emergency department include none    Urinalysis:       Invalid input(s): URIBILINOGEN     Urine Micro:        EKG, Pathology, and Other Studies: I have personally reviewed pertinent reports  Medications given in Emergency Department     Medication Administration - last 24 hours from 07/08/2019 0449 to 07/09/2019 0449       Date/Time Order Dose Route Action Action by     07/09/2019 0403 acetaminophen (TYLENOL) tablet 975 mg 975 mg Oral Given Abril Brown RN     07/09/2019 0403 aluminum-magnesium hydroxide-simethicone (MYLANTA) 200-200-20 mg/5 mL oral suspension 30 mL 30 mL Oral Given Abril Brown RN          Assessment and Plan     1) Dizziness  Patient woke up middle night feeling dizzy  Pending orthostatic blood pressures  2)Chest tightness   the patient says that she experiences chest tightness every now and then; however very mild  ACS rule out   troponins x3  with EKG q3   echo if deemed necessary    3)  Hypertension   patient's blood pressure consistently elevated; SBP more than 180  metoprolol 25 mg ordered   amlodipine 5 mg ordered   labetalol 5 mg p r n  4)   Hyperlipidemia   atorvastatin 20 mg      Code Status: Level 1 - Full Code  VTE Pharmacologic Prophylaxis: Enoxaparin (Lovenox)   VTE Mechanical Prophylaxis: sequential compression device  Admission Status: OBSERVATION    Admission Time  I spent 20 minutes admitting the patient  This involved direct patient contact where I performed a full history and physical, reviewing previous records, and reviewing laboratory and other diagnostic studies      Horacio Rodriguez MD  Internal Medicine  PGY-1

## 2019-08-08 ENCOUNTER — OFFICE VISIT (OUTPATIENT)
Dept: OBGYN CLINIC | Facility: HOSPITAL | Age: 84
End: 2019-08-08
Payer: MEDICARE

## 2019-08-08 ENCOUNTER — HOSPITAL ENCOUNTER (OUTPATIENT)
Dept: RADIOLOGY | Facility: HOSPITAL | Age: 84
Discharge: HOME | End: 2019-08-08
Attending: ORTHOPAEDIC SURGERY
Payer: MEDICARE

## 2019-08-08 VITALS
WEIGHT: 140 LBS | SYSTOLIC BLOOD PRESSURE: 148 MMHG | DIASTOLIC BLOOD PRESSURE: 87 MMHG | HEIGHT: 63 IN | HEART RATE: 103 BPM | BODY MASS INDEX: 24.8 KG/M2

## 2019-08-08 DIAGNOSIS — M16.11 PRIMARY OSTEOARTHRITIS OF ONE HIP, RIGHT: ICD-10-CM

## 2019-08-08 DIAGNOSIS — M16.11 PRIMARY OSTEOARTHRITIS OF ONE HIP, RIGHT: Primary | ICD-10-CM

## 2019-08-08 PROCEDURE — 99213 OFFICE O/P EST LOW 20 MIN: CPT | Performed by: ORTHOPAEDIC SURGERY

## 2019-08-08 PROCEDURE — 73502 X-RAY EXAM HIP UNI 2-3 VIEWS: CPT

## 2019-08-08 NOTE — PROGRESS NOTES
Orthopaedic Surgery - Office Note  Makayla Workman (11 y o  female)   : 1935   MRN: [id number]  Encounter Date: 2019    Chief Complaint   Patient presents with    Right Hip - Follow-up       Assessment / Plan  Right hip OA    · FL injection of the right hip was ordered today   · Activities as tolerated  · PT was ordered today  · Continue with Tylenol arthritis  · I recommended calcium and benadryl before bed for her leg   Return for 6 weeks following the injection  History of Present Illness  Makayla Workman is a 81 y o  female who presents follow-up regarding her right hip  She was last seen in our office 01/10/2017  It was then that we referred her for a right hip joint injection  This gave her almost 2 years of complete relief  She had return in her pain at the beginning of  and this pain is worse than before  She is interested in another joint injection at this time  She denies any new injury since last time we saw her  There been no other changes  She continues to localize her pain in the groin with the addition of lateral hip and buttock  She also complains of cramping in her bilateral legs at night  Review of Systems  Pertinent items are noted in HPI  All other systems were reviewed and are negative  Physical Exam  /87   Pulse 103   Ht 5' 3" (1 6 m)   Wt 63 5 kg (140 lb)   BMI 24 80 kg/m²   Cons: Appears well  No apparent distress  Psych: Alert  Oriented x3  Mood and affect normal   Eyes: PERRLA, EOMI  Resp: Normal effort  No audible wheezing or stridor  CV: Palpable pulse  No discernable arrhythmia  No LE edema  Lymph:  No palpable cervical, axillary, or inguinal lymphadenopathy  Skin: Warm  No palpable masses  No visible lesions  Neuro: Normal muscle tone  Normal and symmetric DTR's  Right Hip Exam  Alignment / Posture:  Normal resting hip posture  Inspection:  No swelling  No erythema  Palpation:  lateral hip tenderness  ROM:  Hip Flexion 120  Hip ER 30  Hip IR 10  Strength:  5/5 quadriceps and hamstrings  Stability:  (-) Log[place]  Tests:  (+) [facility]  Neurovascular:  Sensation intact in DP/SP/Robetrs/Sa/T nerve distributions  2+ DP & PT pulses  Brisk capillary refill in all toes  Toes warm and perfused  Gait:  Steady  Studies Reviewed  I have personally reviewed pertinent films in PACS and my interpretation is X-rays of the right hip and pelvis performed 8/8/19 show advancement in her arthritic changes from prior films done 01/10/2017  Procedures  No procedures today  Medical, Surgical, Family, and Social History  The patient's medical history, family history, and social history, were reviewed and updated as appropriate  Past Medical History:   Diagnosis Date    Anxiety     HLD (hyperlipidemia)     Hypertension        Past Surgical History:   Procedure Laterality Date    APPENDECTOMY      CHOLECYSTECTOMY      HYSTERECTOMY         History reviewed  No pertinent family history      Social History     Occupational History    Not on file   Tobacco Use    Smoking status: Former Smoker    Smokeless tobacco: Never Used   Substance and Sexual Activity    Alcohol use: Not Currently    Drug use: Not Currently    Sexual activity: Not on file       Allergies   Allergen Reactions    Penicillin G Benzathine          Current Outpatient Medications:     amLODIPine (NORVASC) 10 mg tablet, Take 1 tablet (10 mg total) by mouth daily, Disp: 30 tablet, Rfl: 0    atenolol (TENORMIN) 25 mg tablet, Take 25 mg by mouth daily, Disp: , Rfl:     Latanoprost 0 005 % EMUL, , Disp: , Rfl:     LORazepam (ATIVAN) 0 5 mg tablet, Take 0 5 mg by mouth 2 (two) times a day, Disp: , Rfl:     metoprolol tartrate (LOPRESSOR) 25 mg tablet, , Disp: , Rfl:     OMEPRAZOLE PO, Take 20 mg by mouth daily, Disp: , Rfl:       Tono Kwon MA    Scribe Attestation    I,:   Tono Kwon MA am acting as a scribe while in the presence of the attending physician : I,:   Melonie Wright MD personally performed the services described in this documentation    as scribed in my presence :

## 2019-08-28 ENCOUNTER — TRANSCRIBE ORDERS (OUTPATIENT)
Dept: RADIOLOGY | Facility: HOSPITAL | Age: 84
End: 2019-08-28

## 2019-08-28 ENCOUNTER — HOSPITAL ENCOUNTER (OUTPATIENT)
Dept: RADIOLOGY | Facility: HOSPITAL | Age: 84
Discharge: HOME | End: 2019-08-28
Attending: ORTHOPAEDIC SURGERY
Payer: MEDICARE

## 2019-08-28 DIAGNOSIS — M16.11 PRIMARY OSTEOARTHRITIS OF ONE HIP, RIGHT: ICD-10-CM

## 2019-08-28 PROCEDURE — 77002 NEEDLE LOCALIZATION BY XRAY: CPT

## 2019-08-28 PROCEDURE — 20610 DRAIN/INJ JOINT/BURSA W/O US: CPT

## 2019-08-28 RX ORDER — LIDOCAINE HYDROCHLORIDE 10 MG/ML
10 INJECTION, SOLUTION EPIDURAL; INFILTRATION; INTRACAUDAL; PERINEURAL
Status: COMPLETED | OUTPATIENT
Start: 2019-08-28 | End: 2019-08-28

## 2019-08-28 RX ORDER — METHYLPREDNISOLONE ACETATE 80 MG/ML
80 INJECTION, SUSPENSION INTRA-ARTICULAR; INTRALESIONAL; INTRAMUSCULAR; SOFT TISSUE
Status: COMPLETED | OUTPATIENT
Start: 2019-08-28 | End: 2019-08-28

## 2019-08-28 RX ORDER — BUPIVACAINE HYDROCHLORIDE 2.5 MG/ML
10 INJECTION, SOLUTION EPIDURAL; INFILTRATION; INTRACAUDAL
Status: COMPLETED | OUTPATIENT
Start: 2019-08-28 | End: 2019-08-28

## 2019-08-28 RX ADMIN — METHYLPREDNISOLONE ACETATE 80 MG: 80 INJECTION, SUSPENSION INTRA-ARTICULAR; INTRALESIONAL; INTRAMUSCULAR; SOFT TISSUE at 11:25

## 2019-08-28 RX ADMIN — BUPIVACAINE HYDROCHLORIDE 2 ML: 2.5 INJECTION, SOLUTION EPIDURAL; INFILTRATION; INTRACAUDAL; PERINEURAL at 11:25

## 2019-08-28 RX ADMIN — IOHEXOL 2 ML: 300 INJECTION, SOLUTION INTRAVENOUS at 11:25

## 2019-08-28 RX ADMIN — LIDOCAINE HYDROCHLORIDE 7 ML: 10 INJECTION, SOLUTION EPIDURAL; INFILTRATION; INTRACAUDAL; PERINEURAL at 11:25

## 2019-09-02 DIAGNOSIS — I10 ESSENTIAL HYPERTENSION: ICD-10-CM

## 2019-09-03 DIAGNOSIS — I10 ESSENTIAL HYPERTENSION: ICD-10-CM

## 2019-09-03 RX ORDER — AMLODIPINE BESYLATE 10 MG/1
TABLET ORAL
Qty: 30 TABLET | Refills: 0 | OUTPATIENT
Start: 2019-09-03

## 2019-09-03 RX ORDER — AMLODIPINE BESYLATE 10 MG/1
10 TABLET ORAL DAILY
Qty: 90 TABLET | Refills: 1 | Status: SHIPPED | OUTPATIENT
Start: 2019-09-03 | End: 2020-09-26 | Stop reason: SDUPTHER

## 2019-09-04 ENCOUNTER — EVALUATION (OUTPATIENT)
Dept: PHYSICAL THERAPY | Age: 84
End: 2019-09-04
Payer: MEDICARE

## 2019-09-04 DIAGNOSIS — M16.11 PRIMARY OSTEOARTHRITIS OF ONE HIP, RIGHT: Primary | ICD-10-CM

## 2019-09-04 PROCEDURE — 97162 PT EVAL MOD COMPLEX 30 MIN: CPT | Performed by: PHYSICAL THERAPIST
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
## 2019-09-10 ENCOUNTER — OFFICE VISIT (OUTPATIENT)
Dept: PHYSICAL THERAPY | Age: 84
End: 2019-09-10
Payer: MEDICARE

## 2019-09-10 DIAGNOSIS — M16.11 PRIMARY OSTEOARTHRITIS OF ONE HIP, RIGHT: Primary | ICD-10-CM

## 2019-09-10 PROCEDURE — 97110 THERAPEUTIC EXERCISES: CPT

## 2019-09-17 ENCOUNTER — APPOINTMENT (OUTPATIENT)
Dept: PHYSICAL THERAPY | Age: 84
End: 2019-09-17
Payer: MEDICARE

## 2019-09-24 ENCOUNTER — APPOINTMENT (OUTPATIENT)
Dept: PHYSICAL THERAPY | Age: 84
End: 2019-09-24
Payer: MEDICARE

## 2019-10-01 ENCOUNTER — EVALUATION (OUTPATIENT)
Dept: PHYSICAL THERAPY | Age: 84
End: 2019-10-01
Payer: MEDICARE

## 2019-10-01 DIAGNOSIS — M16.11 PRIMARY OSTEOARTHRITIS OF ONE HIP, RIGHT: Primary | ICD-10-CM

## 2019-10-01 PROCEDURE — 97140 MANUAL THERAPY 1/> REGIONS: CPT | Performed by: PHYSICAL THERAPIST

## 2019-10-01 PROCEDURE — 97110 THERAPEUTIC EXERCISES: CPT | Performed by: PHYSICAL THERAPIST

## 2019-10-01 NOTE — PROGRESS NOTES
Daily Note     Today's date: 10/1/2019  Patient name: Warren Pack  : 1935  MRN: [id number]  Referring provider: Earline Saavedra MD  Dx:   Encounter Diagnosis     ICD-10-CM    1  Primary osteoarthritis of one hip, right M16 11                   Subjective: Pt can feel the injection wearing off since last visit  Objective: See treatment diary below      Assessment: Progression of therapeutic exercises and balance today  Pt tight in R hamstring, IT band and hip flexor  Gentle stretching is tolerated by patient  Pt reports decreased pain and better mobility after exercises  Pt educated about benefits of strengthening hip musculature and balance exercises  Will monitor patient response to be able to continue progressing functional exercises  Plan: Continue exercises as tolerated  Will re-assess after 4 weeks of therapy  Precautions ORTHOSTATIC HYPOTENSION- doesn't drink enough water throughout the day  Has had over the years  Specialty Daily Treatment Diary     FALL RISK  1x/week- progress HEP as able and program   Manual 9/4 9/10 10/1     IT band stretch, R   20sec x5     R ham stretch  [id number] 20 sec x5     S/l hip flexor stretch and quad stretch  [id number] light -     Exercise Diary         Bike   Nu step 5 min -     LTR B 5x:10 5 x 10 10sec x5     SKTC  Attempted, too painful at hip       Add set w/ ball  5sex20 5 sec x20     bridges  10 10x     SAQ  2# 2x10 2# 2x10     Standing hip 3 way  10 flex & abd 10 flex, abd, ext     Stand ham curls  10 10x     Side stepping   2Laps     Step ups FW   4" 10x R     Step ups lateral   4" 10x R     Standing hip flexor stretch   10sec x5             SLS firm   NV                                                                     Modalities                                Progress as able

## 2019-10-10 ENCOUNTER — OFFICE VISIT (OUTPATIENT)
Dept: PHYSICAL THERAPY | Age: 84
End: 2019-10-10
Payer: MEDICARE

## 2019-10-10 DIAGNOSIS — M16.11 PRIMARY OSTEOARTHRITIS OF ONE HIP, RIGHT: Primary | ICD-10-CM

## 2019-10-10 PROCEDURE — 97110 THERAPEUTIC EXERCISES: CPT | Performed by: PHYSICAL THERAPIST

## 2019-10-10 PROCEDURE — 97140 MANUAL THERAPY 1/> REGIONS: CPT | Performed by: PHYSICAL THERAPIST

## 2019-10-10 NOTE — PROGRESS NOTES
Daily Note     Today's date: 10/10/2019  Patient name: Harriet Spann  : 1935  MRN: [id number]  Referring provider: Julieta Trevino MD  Dx:   Encounter Diagnosis     ICD-10-CM    1  Primary osteoarthritis of one hip, right M16 11                   Subjective: Pt reports he body is sore from sitting in a wooden rocker that she is not used to for a few hours at her daughters house last night          Objective: See treatment diary below      Assessment: Pt tolerated treatment well today with improved exercise technique, less fatigue, and less rest breaks  Pt reports she has been compliant with her HEP  Pt educated to use SPC in left hand instead of right hand  Use of elevated pillows and wedge during supine exercises to prevent orthostatic dizziness  Pt would benefit from continued skilled therapy and progression of exercises next treatment session  Plan: Continue exercises as tolerated  Will re-assess after 4 weeks of therapy  Precautions ORTHOSTATIC HYPOTENSION- doesn't drink enough water throughout the day  Has had over the years       Specialty Daily Treatment Diary     FALL RISK  1x/week- progress HEP as able and program   Manual 9/4 9/10 10/1 10/10    IT band stretch, R   20sec x5 20sec x5    R ham stretch  [id number] 20 sec x5 20sec x 5    S/l hip flexor stretch and quad stretch  [id number] light -     Exercise Diary         Bike   Nu step 5 min - 5'    LTR B 5x:10 5 x 10 10sec x5 10sec x 5    SKTC  Attempted, too painful at hip       Add set w/ ball  5sex20 5 sec x20 5sec x20    bridges  10 10x 10x    SAQ  2# 2x10 2# 2x10 2# 2 x10    Standing hip 3 way  10 flex & abd 10 flex, abd, ext 2# 10x ea    Stand ham curls  10 10x 2# 20x    Side stepping   2Laps 2 laps    Step ups FW   4" 10x R 4" 20x R    Step ups lateral   4" 10x R 4" 20x R    Standing hip flexor stretch   10sec x5 10sec x5            SLS firm   NV     Tandem stance    10 sec x 2 ea Modalities                                Progress as able

## 2019-10-17 ENCOUNTER — APPOINTMENT (OUTPATIENT)
Dept: PHYSICAL THERAPY | Age: 84
End: 2019-10-17
Payer: MEDICARE

## 2019-10-24 ENCOUNTER — APPOINTMENT (OUTPATIENT)
Dept: PHYSICAL THERAPY | Age: 84
End: 2019-10-24
Payer: MEDICARE

## 2019-10-31 ENCOUNTER — APPOINTMENT (OUTPATIENT)
Dept: PHYSICAL THERAPY | Age: 84
End: 2019-10-31
Payer: MEDICARE

## 2020-09-26 DIAGNOSIS — I10 ESSENTIAL HYPERTENSION: Primary | ICD-10-CM

## 2020-09-26 RX ORDER — AMLODIPINE BESYLATE 5 MG/1
TABLET ORAL
Qty: 90 TABLET | Refills: 0 | Status: SHIPPED | OUTPATIENT
Start: 2020-09-26 | End: 2020-12-24

## 2020-10-23 DIAGNOSIS — F41.0 ANXIETY ATTACK: Primary | ICD-10-CM

## 2020-10-23 RX ORDER — LORAZEPAM 0.5 MG/1
0.5 TABLET ORAL 2 TIMES DAILY
Qty: 60 TABLET | Refills: 0 | Status: SHIPPED | OUTPATIENT
Start: 2020-10-23 | End: 2020-11-21

## 2020-10-27 ENCOUNTER — CLINICAL SUPPORT (OUTPATIENT)
Dept: INTERNAL MEDICINE CLINIC | Facility: CLINIC | Age: 85
End: 2020-10-27
Payer: MEDICARE

## 2020-10-27 DIAGNOSIS — Z23 FLU VACCINE NEED: Primary | ICD-10-CM

## 2020-10-27 PROCEDURE — G0008 ADMIN INFLUENZA VIRUS VAC: HCPCS

## 2020-10-27 PROCEDURE — 90662 IIV NO PRSV INCREASED AG IM: CPT

## 2020-10-28 PROBLEM — K59.04 CHRONIC IDIOPATHIC CONSTIPATION: Status: ACTIVE | Noted: 2020-10-28

## 2020-11-21 DIAGNOSIS — F41.0 ANXIETY ATTACK: ICD-10-CM

## 2020-11-21 RX ORDER — LORAZEPAM 0.5 MG/1
TABLET ORAL
Qty: 60 TABLET | Refills: 0 | Status: SHIPPED | OUTPATIENT
Start: 2020-11-21 | End: 2020-12-21 | Stop reason: SDUPTHER

## 2020-12-21 DIAGNOSIS — F41.0 ANXIETY ATTACK: ICD-10-CM

## 2020-12-21 RX ORDER — LORAZEPAM 0.5 MG/1
0.5 TABLET ORAL 2 TIMES DAILY
Qty: 60 TABLET | Refills: 0 | Status: SHIPPED | OUTPATIENT
Start: 2020-12-21 | End: 2021-01-18 | Stop reason: SDUPTHER

## 2020-12-24 DIAGNOSIS — I10 ESSENTIAL HYPERTENSION: Primary | ICD-10-CM

## 2020-12-24 DIAGNOSIS — K21.9 GASTROESOPHAGEAL REFLUX DISEASE WITHOUT ESOPHAGITIS: ICD-10-CM

## 2020-12-24 RX ORDER — AMLODIPINE BESYLATE 5 MG/1
TABLET ORAL
Qty: 90 TABLET | Refills: 0 | Status: SHIPPED | OUTPATIENT
Start: 2020-12-24 | End: 2021-05-10 | Stop reason: SDUPTHER

## 2020-12-24 RX ORDER — OMEPRAZOLE 20 MG/1
CAPSULE, DELAYED RELEASE ORAL
Qty: 90 CAPSULE | Refills: 0 | Status: SHIPPED | OUTPATIENT
Start: 2020-12-24 | End: 2021-05-10 | Stop reason: SDUPTHER

## 2021-01-18 DIAGNOSIS — F41.0 ANXIETY ATTACK: ICD-10-CM

## 2021-01-18 RX ORDER — LORAZEPAM 0.5 MG/1
0.5 TABLET ORAL 2 TIMES DAILY
Qty: 60 TABLET | Refills: 0 | Status: SHIPPED | OUTPATIENT
Start: 2021-01-18 | End: 2021-02-15

## 2021-01-27 ENCOUNTER — IMMUNIZATIONS (OUTPATIENT)
Dept: FAMILY MEDICINE CLINIC | Facility: HOSPITAL | Age: 86
End: 2021-01-27

## 2021-01-27 DIAGNOSIS — Z23 ENCOUNTER FOR IMMUNIZATION: Primary | ICD-10-CM

## 2021-01-27 PROCEDURE — 0001A SARS-COV-2 / COVID-19 MRNA VACCINE (PFIZER-BIONTECH) 30 MCG: CPT

## 2021-01-27 PROCEDURE — 91300 SARS-COV-2 / COVID-19 MRNA VACCINE (PFIZER-BIONTECH) 30 MCG: CPT

## 2021-02-15 DIAGNOSIS — F41.0 ANXIETY ATTACK: ICD-10-CM

## 2021-02-15 RX ORDER — LORAZEPAM 0.5 MG/1
TABLET ORAL
Qty: 60 TABLET | Refills: 0 | Status: SHIPPED | OUTPATIENT
Start: 2021-02-15 | End: 2021-03-10 | Stop reason: SDUPTHER

## 2021-02-19 ENCOUNTER — IMMUNIZATIONS (OUTPATIENT)
Dept: FAMILY MEDICINE CLINIC | Facility: HOSPITAL | Age: 86
End: 2021-02-19

## 2021-02-19 DIAGNOSIS — Z23 ENCOUNTER FOR IMMUNIZATION: Primary | ICD-10-CM

## 2021-02-19 PROCEDURE — 91300 SARS-COV-2 / COVID-19 MRNA VACCINE (PFIZER-BIONTECH) 30 MCG: CPT

## 2021-02-19 PROCEDURE — 0002A SARS-COV-2 / COVID-19 MRNA VACCINE (PFIZER-BIONTECH) 30 MCG: CPT

## 2021-03-10 DIAGNOSIS — F41.0 ANXIETY ATTACK: ICD-10-CM

## 2021-03-10 RX ORDER — LORAZEPAM 0.5 MG/1
0.5 TABLET ORAL 2 TIMES DAILY
Qty: 60 TABLET | Refills: 0 | Status: SHIPPED | OUTPATIENT
Start: 2021-03-10 | End: 2021-04-07 | Stop reason: SDUPTHER

## 2021-04-07 DIAGNOSIS — F41.0 ANXIETY ATTACK: ICD-10-CM

## 2021-04-07 RX ORDER — LATANOPROST 50 UG/ML
SOLUTION/ DROPS OPHTHALMIC
COMMUNITY
Start: 2021-02-08

## 2021-04-07 RX ORDER — LORAZEPAM 0.5 MG/1
0.5 TABLET ORAL 2 TIMES DAILY
Qty: 60 TABLET | Refills: 0 | Status: SHIPPED | OUTPATIENT
Start: 2021-04-07 | End: 2021-05-03 | Stop reason: SDUPTHER

## 2021-04-09 ENCOUNTER — TELEPHONE (OUTPATIENT)
Dept: INTERNAL MEDICINE CLINIC | Facility: CLINIC | Age: 86
End: 2021-04-09

## 2021-05-03 DIAGNOSIS — F41.0 ANXIETY ATTACK: ICD-10-CM

## 2021-05-03 RX ORDER — LORAZEPAM 0.5 MG/1
0.5 TABLET ORAL 2 TIMES DAILY
Qty: 60 TABLET | Refills: 0 | Status: SHIPPED | OUTPATIENT
Start: 2021-05-03 | End: 2021-05-25 | Stop reason: SDUPTHER

## 2021-05-10 DIAGNOSIS — I10 ESSENTIAL HYPERTENSION: ICD-10-CM

## 2021-05-10 DIAGNOSIS — K21.9 GASTROESOPHAGEAL REFLUX DISEASE WITHOUT ESOPHAGITIS: ICD-10-CM

## 2021-05-10 RX ORDER — OMEPRAZOLE 20 MG/1
20 CAPSULE, DELAYED RELEASE ORAL DAILY
Qty: 90 CAPSULE | Refills: 0 | Status: SHIPPED | OUTPATIENT
Start: 2021-05-10 | End: 2021-08-02 | Stop reason: SDUPTHER

## 2021-05-10 RX ORDER — AMLODIPINE BESYLATE 5 MG/1
5 TABLET ORAL DAILY
Qty: 90 TABLET | Refills: 2 | Status: SHIPPED | OUTPATIENT
Start: 2021-05-10 | End: 2022-02-03 | Stop reason: SDUPTHER

## 2021-05-25 DIAGNOSIS — F41.0 ANXIETY ATTACK: ICD-10-CM

## 2021-05-25 RX ORDER — LORAZEPAM 0.5 MG/1
0.5 TABLET ORAL 2 TIMES DAILY
Qty: 60 TABLET | Refills: 0 | Status: SHIPPED | OUTPATIENT
Start: 2021-05-25 | End: 2021-06-28 | Stop reason: SDUPTHER

## 2021-06-03 ENCOUNTER — OFFICE VISIT (OUTPATIENT)
Dept: INTERNAL MEDICINE CLINIC | Facility: CLINIC | Age: 86
End: 2021-06-03
Payer: MEDICARE

## 2021-06-03 VITALS
HEIGHT: 63 IN | SYSTOLIC BLOOD PRESSURE: 137 MMHG | DIASTOLIC BLOOD PRESSURE: 82 MMHG | OXYGEN SATURATION: 97 % | WEIGHT: 120 LBS | BODY MASS INDEX: 21.26 KG/M2 | HEART RATE: 85 BPM | TEMPERATURE: 97.4 F

## 2021-06-03 DIAGNOSIS — K57.92 ACUTE DIVERTICULITIS OF INTESTINE: ICD-10-CM

## 2021-06-03 DIAGNOSIS — R10.32 LEFT LOWER QUADRANT ABDOMINAL PAIN: ICD-10-CM

## 2021-06-03 DIAGNOSIS — K57.92 DIVERTICULITIS: Primary | ICD-10-CM

## 2021-06-03 DIAGNOSIS — Z00.00 MEDICARE ANNUAL WELLNESS VISIT, SUBSEQUENT: ICD-10-CM

## 2021-06-03 PROCEDURE — 1123F ACP DISCUSS/DSCN MKR DOCD: CPT | Performed by: INTERNAL MEDICINE

## 2021-06-03 PROCEDURE — 99214 OFFICE O/P EST MOD 30 MIN: CPT | Performed by: INTERNAL MEDICINE

## 2021-06-03 PROCEDURE — G0439 PPPS, SUBSEQ VISIT: HCPCS | Performed by: INTERNAL MEDICINE

## 2021-06-03 RX ORDER — AMOXICILLIN AND CLAVULANATE POTASSIUM 200; 28.5 MG/1; MG/1
1 TABLET, CHEWABLE ORAL EVERY 12 HOURS SCHEDULED
Qty: 14 TABLET | Refills: 0 | Status: SHIPPED | OUTPATIENT
Start: 2021-06-03 | End: 2021-06-10

## 2021-06-03 NOTE — PATIENT INSTRUCTIONS
Medicare Preventive Visit Patient Instructions  Thank you for completing your Welcome to Medicare Visit or Medicare Annual Wellness Visit today  Your next wellness visit will be due in one year (6/4/2022)  The screening/preventive services that you may require over the next 5-10 years are detailed below  Some tests may not apply to you based off risk factors and/or age  Screening tests ordered at today's visit but not completed yet may show as past due  Also, please note that scanned in results may not display below  Preventive Screenings:  Service Recommendations Previous Testing/Comments   Colorectal Cancer Screening  * Colonoscopy    * Fecal Occult Blood Test (FOBT)/Fecal Immunochemical Test (FIT)  * Fecal DNA/Cologuard Test  * Flexible Sigmoidoscopy Age: 54-65 years old   Colonoscopy: every 10 years (may be performed more frequently if at higher risk)  OR  FOBT/FIT: every 1 year  OR  Cologuard: every 3 years  OR  Sigmoidoscopy: every 5 years  Screening may be recommended earlier than age 48 if at higher risk for colorectal cancer  Also, an individualized decision between you and your healthcare provider will decide whether screening between the ages of 74-80 would be appropriate  Colonoscopy: 04/04/2013  FOBT/FIT: Not on file  Cologuard: Not on file  Sigmoidoscopy: Not on file    Screening Not Indicated     Breast Cancer Screening Age: 36 years old  Frequency: every 1-2 years  Not required if history of left and right mastectomy Mammogram: Not on file        Cervical Cancer Screening Between the ages of 21-29, pap smear recommended once every 3 years  Between the ages of 33-67, can perform pap smear with HPV co-testing every 5 years     Recommendations may differ for women with a history of total hysterectomy, cervical cancer, or abnormal pap smears in past  Pap Smear: Not on file    Screening Not Indicated   Hepatitis C Screening Once for adults born between 1945 and 1965  More frequently in patients at high risk for Hepatitis C Hep C Antibody: Not on file        Diabetes Screening 1-2 times per year if you're at risk for diabetes or have pre-diabetes Fasting glucose: No results in last 5 years   A1C: No results in last 5 years        Cholesterol Screening Once every 5 years if you don't have a lipid disorder  May order more often based on risk factors  Lipid panel: Not on file          Other Preventive Screenings Covered by Medicare:  1  Abdominal Aortic Aneurysm (AAA) Screening: covered once if your at risk  You're considered to be at risk if you have a family history of AAA  2  Lung Cancer Screening: covers low dose CT scan once per year if you meet all of the following conditions: (1) Age 50-69; (2) No signs or symptoms of lung cancer; (3) Current smoker or have quit smoking within the last 15 years; (4) You have a tobacco smoking history of at least 30 pack years (packs per day multiplied by number of years you smoked); (5) You get a written order from a healthcare provider  3  Glaucoma Screening: covered annually if you're considered high risk: (1) You have diabetes OR (2) Family history of glaucoma OR (3)  aged 48 and older OR (3)  American aged 72 and older  3  Osteoporosis Screening: covered every 2 years if you meet one of the following conditions: (1) You're estrogen deficient and at risk for osteoporosis based off medical history and other findings; (2) Have a vertebral abnormality; (3) On glucocorticoid therapy for more than 3 months; (4) Have primary hyperparathyroidism; (5) On osteoporosis medications and need to assess response to drug therapy  · Last bone density test (DXA Scan): Not on file  5  HIV Screening: covered annually if you're between the age of 12-76  Also covered annually if you are younger than 13 and older than 72 with risk factors for HIV infection  For pregnant patients, it is covered up to 3 times per pregnancy      Immunizations:  Immunization Recommendations   Influenza Vaccine Annual influenza vaccination during flu season is recommended for all persons aged >= 6 months who do not have contraindications   Pneumococcal Vaccine (Prevnar and Pneumovax)  * Prevnar = PCV13  * Pneumovax = PPSV23   Adults 25-60 years old: 1-3 doses may be recommended based on certain risk factors  Adults 72 years old: Prevnar (PCV13) vaccine recommended followed by Pneumovax (PPSV23) vaccine  If already received PPSV23 since turning 65, then PCV13 recommended at least one year after PPSV23 dose  Hepatitis B Vaccine 3 dose series if at intermediate or high risk (ex: diabetes, end stage renal disease, liver disease)   Tetanus (Td) Vaccine - COST NOT COVERED BY MEDICARE PART B Following completion of primary series, a booster dose should be given every 10 years to maintain immunity against tetanus  Td may also be given as tetanus wound prophylaxis  Tdap Vaccine - COST NOT COVERED BY MEDICARE PART B Recommended at least once for all adults  For pregnant patients, recommended with each pregnancy  Shingles Vaccine (Shingrix) - COST NOT COVERED BY MEDICARE PART B  2 shot series recommended in those aged 48 and above     Health Maintenance Due:      Topic Date Due    Colonoscopy Surveillance  04/04/2018     Immunizations Due:      Topic Date Due    DTaP,Tdap,and Td Vaccines (1 - Tdap) 01[birth year]/1957     Advance Directives   What are advance directives? Advance directives are legal documents that state your wishes and plans for medical care  These plans are made ahead of time in case you lose your ability to make decisions for yourself  Advance directives can apply to any medical decision, such as the treatments you want, and if you want to donate organs  What are the types of advance directives? There are many types of advance directives, and each state has rules about how to use them  You may choose a combination of any of the following:  · Living will:   This is a written record of the treatment you want  You can also choose which treatments you do not want, which to limit, and which to stop at a certain time  This includes surgery, medicine, IV fluid, and tube feedings  · Durable power of  for healthcare [facility]): This is a written record that states who you want to make healthcare choices for you when you are unable to make them for yourself  This person, called a proxy, is usually a family member or a friend  You may choose more than 1 proxy  · Do not resuscitate (DNR) order:  A DNR order is used in case your heart stops beating or you stop breathing  It is a request not to have certain forms of treatment, such as CPR  A DNR order may be included in other types of advance directives  · Medical directive: This covers the care that you want if you are in a coma, near death, or unable to make decisions for yourself  You can list the treatments you want for each condition  Treatment may include pain medicine, surgery, blood transfusions, dialysis, IV or tube feedings, and a ventilator (breathing machine)  · Values history: This document has questions about your views, beliefs, and how you feel and think about life  This information can help others choose the care that you would choose  Why are advance directives important? An advance directive helps you control your care  Although spoken wishes may be used, it is better to have your wishes written down  Spoken wishes can be misunderstood, or not followed  Treatments may be given even if you do not want them  An advance directive may make it easier for your family to make difficult choices about your care  Urinary Incontinence   Urinary incontinence (UI)  is when you lose control of your bladder  UI develops because your bladder cannot store or empty urine properly  The 3 most common types of UI are stress incontinence, urge incontinence, or both  Medicines:   · May be given to help strengthen your bladder control  Report any side effects of medication to your healthcare provider  Do pelvic muscle exercises often:  Your pelvic muscles help you stop urinating  Squeeze these muscles tight for 5 seconds, then relax for 5 seconds  Gradually work up to squeezing for 10 seconds  Do 3 sets of 15 repetitions a day, or as directed  This will help strengthen your pelvic muscles and improve bladder control  Train your bladder:  Go to the bathroom at set times, such as every 2 hours, even if you do not feel the urge to go  You can also try to hold your urine when you feel the urge to go  For example, hold your urine for 5 minutes when you feel the urge to go  As that becomes easier, hold your urine for 10 minutes  Self-care:   · Keep a UI record  Write down how often you leak urine and how much you leak  Make a note of what you were doing when you leaked urine  · Drink liquids as directed  You may need to limit the amount of liquid you drink to help control your urine leakage  Do not drink any liquid right before you go to bed  Limit or do not have drinks that contain caffeine or alcohol  · Prevent constipation  Eat a variety of high-fiber foods  Good examples are high-fiber cereals, beans, vegetables, and whole-grain breads  Walking is the best way to trigger your intestines to have a bowel movement  · Exercise regularly and maintain a healthy weight  Weight loss and exercise will decrease pressure on your bladder and help you control your leakage  · Use a catheter as directed  to help empty your bladder  A catheter is a tiny, plastic tube that is put into your bladder to drain your urine  · Go to behavior therapy as directed  Behavior therapy may be used to help you learn to control your urge to urinate  © Copyright [employer] 2018 Information is for End User's use only and may not be sold, redistributed or otherwise used for commercial purposes   All illustrations and images included in CareNotes® are the copyrighted property of A D A M , [employer]  or [address] [place], [place] [address]melodie [facility]

## 2021-06-03 NOTE — PROGRESS NOTES
Assessment/Plan:    No problem-specific Assessment & Plan notes found for this encounter  Diagnoses and all orders for this visit:    Diverticulitis  -     CBC (Includes Diff/Plt) (Refl); Future  -     Sedimentation rate, automated; Future  -     C-reactive protein; Future    Medicare annual wellness visit, subsequent          Subjective:      Patient ID: Lorenzo Hwang is a 81 y o  female  Patient   Presented  With  Left  Lower  Quadrant  Abdominal  Pain ,no diarrhea, more constipation,  No  Blood in the stool , no fever  The following portions of the patient's history were reviewed and updated as appropriate: allergies, current medications, past family history, past medical history, past social history, past surgical history, and problem list     Review of Systems   Constitutional: Negative  HENT: Negative for dental problem, drooling, ear discharge and ear pain  Eyes: Negative for discharge, redness and itching  Respiratory: Negative for apnea, cough and wheezing  Cardiovascular: Negative for chest pain and palpitations  Gastrointestinal: Positive for abdominal pain  Negative for blood in stool, diarrhea and vomiting  Endocrine: Negative for polydipsia, polyphagia and polyuria  Genitourinary: Negative for decreased urine volume, dysuria and frequency  Musculoskeletal: Negative for arthralgias, myalgias and neck stiffness  Skin: Negative for pallor and wound  Allergic/Immunologic: Negative for environmental allergies and food allergies  Neurological: Negative for facial asymmetry, light-headedness, numbness and headaches  Hematological: Negative for adenopathy  Does not bruise/bleed easily  Psychiatric/Behavioral: Negative for agitation, behavioral problems and confusion           Objective:      /82 (BP Location: Left arm, Patient Position: Sitting, Cuff Size: Standard)   Pulse 85   Temp (!) 97 4 °F (36 3 °C) (Temporal)   Ht 5' 3" (1 6 m)   Wt 54 4 kg (120 lb) SpO2 97%   BMI 21 26 kg/m²          Physical Exam    HEENT  Normal  LUNGS  Clear  HEART  Regular heart  Rate  And  Rhythm  ABDOMEN    No  Distension,  Normal  Bowel sounds  Tenderness with no rebound  Or  Guarding  LLQ  EXTREMITIES    No    Edema    ABDOMINAL  Pain    Most  Likely  Due  To  Mild  Case of  Diverticulosis or diverticulitis  Order  Blood  Work   Augmentin  500mg   Tid for  7 days  If not  Better  Will  Obtain   CT  Of  Abdomen    LUKE Kimbrough MD

## 2021-06-03 NOTE — PROGRESS NOTES
Assessment and Plan:     Problem List Items Addressed This Visit     None           Preventive health issues were discussed with patient, and age appropriate screening tests were ordered as noted in patient's After Visit Summary  Personalized health advice and appropriate referrals for health education or preventive services given if needed, as noted in patient's After Visit Summary  History of Present Illness:     Patient presents for Medicare Annual Wellness visit    Patient Care Team:  Patricia Astudillo MD as PCP - General (Internal Medicine)     Problem List:     Patient Active Problem List   Diagnosis    Dizziness    Chest tightness    HTN (hypertension)    GERD (gastroesophageal reflux disease)    Chronic idiopathic constipation      Past Medical and Surgical History:     Past Medical History:   Diagnosis Date    Allergic rhinitis     Anxiety     Diverticulitis of large intestine without perforation or abscess without bleeding     Dysthymic disorder     Gastro-esophageal reflux disease without esophagitis     HLD (hyperlipidemia)     Hypertension     Osteopenia     Palpitations     Paresthesia      Past Surgical History:   Procedure Laterality Date    APPENDECTOMY      CHOLECYSTECTOMY      COLON SURGERY      FL INJECTION RIGHT HIP (NON ARTHROGRAM)  8/28/2019    HYSTERECTOMY        Family History:     Family History   Problem Relation Age of Onset    Hypertension Mother     Hypertension Father       Social History:        Social History     Socioeconomic History    Marital status:       Spouse name: Not on file    Number of children: Not on file    Years of education: Not on file    Highest education level: Not on file   Occupational History    Not on file   Social Needs    Financial resource strain: Not on file    Food insecurity     Worry: Not on file     Inability: Not on file    Transportation needs     Medical: Not on file     Non-medical: Not on file   Tobacco Use  Smoking status: Former Smoker    Smokeless tobacco: Never Used   Substance and Sexual Activity    Alcohol use: Not Currently    Drug use: Not Currently    Sexual activity: Not on file   Lifestyle    Physical activity     Days per week: Not on file     Minutes per session: Not on file    Stress: Not on file   Relationships    Social connections     Talks on phone: Not on file     Gets together: Not on file     Attends Jew service: Not on file     Active member of club or organization: Not on file     Attends meetings of clubs or organizations: Not on file     Relationship status: Not on file    Intimate partner violence     Fear of current or ex partner: Not on file     Emotionally abused: Not on file     Physically abused: Not on file     Forced sexual activity: Not on file   Other Topics Concern    Not on file   Social History Narrative    Smoking: Unknown if ever smoked    Pets: None    As per [employer]      Medications and Allergies:     Current Outpatient Medications   Medication Sig Dispense Refill    amLODIPine (NORVASC) 5 mg tablet Take 1 tablet (5 mg total) by mouth daily 90 tablet 2    latanoprost (XALATAN) 0 005 % ophthalmic solution       Latanoprost 0 005 % EMUL       LORazepam (ATIVAN) 0 5 mg tablet Take 1 tablet (0 5 mg total) by mouth 2 (two) times a day 60 tablet 0    metoprolol tartrate (LOPRESSOR) 25 mg tablet TAKE ONE TABLET BY MOUTH TWICE DAILY  180 tablet 0    omeprazole (PriLOSEC) 20 mg delayed release capsule Take 1 capsule (20 mg total) by mouth daily 90 capsule 0    OMEPRAZOLE PO Take 20 mg by mouth daily       No current facility-administered medications for this visit        Allergies   Allergen Reactions    Penicillin G Benzathine       Immunizations:     Immunization History   Administered Date(s) Administered    Influenza, high dose seasonal 0 7 mL 10/27/2020    Pneumococcal Conjugate 13-Valent 09/11/2015    Pneumococcal Polysaccharide PPV23 01/30/2003  SARS-CoV-2 / COVID-19 mRNA IM (Pfizer-BioNTech) 01/27/2021, 02/19/2021    Td (adult), Unspecified 04/19/2011    Td (adult), adsorbed 04/19/2011    Zoster 11/17/2010      Health Maintenance:         Topic Date Due    Colonoscopy Surveillance  04/04/2018         Topic Date Due    DTaP,Tdap,and Td Vaccines (1 - Tdap) 01/10/1957      Medicare Health Risk Assessment:     Pulse 85   Temp (!) 97 4 °F (36 3 °C) (Temporal)   Ht 5' 3" (1 6 m)   Wt 54 4 kg (120 lb)   SpO2 97%   BMI 21 26 kg/m²      Cassie Angeles is here for her Subsequent Wellness visit  Health Risk Assessment:   Patient rates overall health as fair  Patient feels that their physical health rating is same  Patient is very satisfied with their life  Eyesight was rated as same  Hearing was rated as same  Patient feels that their emotional and mental health rating is same  Patients states they are never, rarely angry  Patient states they are never, rarely unusually tired/fatigued  Pain experienced in the last 7 days has been none  Patient states that she has experienced no weight loss or gain in last 6 months  Fall Risk Screening: In the past year, patient has experienced: no history of falling in past year      Urinary Incontinence Screening:   Patient has leaked urine accidently in the last six months  Home Safety:  Patient has trouble with stairs inside or outside of their home  Patient has working smoke alarms and has working carbon monoxide detector  Home safety hazards include: none  Nutrition:   Current diet is Regular  Medications:   Patient is currently taking over-the-counter supplements  OTC medications include: see medication list  Patient is not able to manage medications  Activities of Daily Living (ADLs)/Instrumental Activities of Daily Living (IADLs):   Walk and transfer into and out of bed and chair?: Yes  Dress and groom yourself?: Yes    Bathe or shower yourself?: Yes    Feed yourself?  Yes  Do your laundry/housekeeping?: No  Manage your money, pay your bills and track your expenses?: No  Make your own meals?: No    Do your own shopping?: No    Previous Hospitalizations:   Any hospitalizations or ED visits within the last 12 months?: No      Advance Care Planning:   Living will: Yes    Durable POA for healthcare: Yes    Advanced directive: Yes      PREVENTIVE SCREENINGS        Colorectal Cancer Screening:     General: Screening Not Indicated      Cervical Cancer Screening:    General: Screening Not Indicated      Lung Cancer Screening:     General: Screening Not Indicated    Screening, Brief Intervention, and Referral to Treatment (SBIRT)    Screening  Typical number of drinks in a day: 0  Typical number of drinks in a week: 0  Interpretation: Low risk drinking behavior      Single Item Drug Screening:  How often have you used an illegal drug (including marijuana) or a prescription medication for non-medical reasons in the past year? never    Single Item Drug Screen Score: 0  Interpretation: Negative screen for possible drug use disorder      Rica Das MD

## 2021-06-04 LAB
BASOPHILS # BLD AUTO: 0.1 X10E3/UL (ref 0–0.2)
BASOPHILS NFR BLD AUTO: 1 %
CRP SERPL-MCNC: 1 MG/L (ref 0–10)
EOSINOPHIL # BLD AUTO: 0.1 X10E3/UL (ref 0–0.4)
EOSINOPHIL NFR BLD AUTO: 1 %
ERYTHROCYTE [DISTWIDTH] IN BLOOD BY AUTOMATED COUNT: 12.8 % (ref 11.7–15.4)
ERYTHROCYTE [SEDIMENTATION RATE] IN BLOOD BY WESTERGREN METHOD: 18 MM/HR (ref 0–40)
HCT VFR BLD AUTO: 37.5 % (ref 34–46.6)
HGB BLD-MCNC: 13.5 G/DL (ref 11.1–15.9)
IMM GRANULOCYTES # BLD: 0 X10E3/UL (ref 0–0.1)
IMM GRANULOCYTES NFR BLD: 0 %
LYMPHOCYTES # BLD AUTO: 1.4 X10E3/UL (ref 0.7–3.1)
LYMPHOCYTES NFR BLD AUTO: 19 %
MCH RBC QN AUTO: 32.5 PG (ref 26.6–33)
MCHC RBC AUTO-ENTMCNC: 36 G/DL (ref 31.5–35.7)
MCV RBC AUTO: 90 FL (ref 79–97)
MONOCYTES # BLD AUTO: 0.4 X10E3/UL (ref 0.1–0.9)
MONOCYTES NFR BLD AUTO: 6 %
NEUTROPHILS # BLD AUTO: 5.5 X10E3/UL (ref 1.4–7)
NEUTROPHILS NFR BLD AUTO: 73 %
PLATELET # BLD AUTO: 283 X10E3/UL (ref 150–450)
RBC # BLD AUTO: 4.16 X10E6/UL (ref 3.77–5.28)
WBC # BLD AUTO: 7.4 X10E3/UL (ref 3.4–10.8)

## 2021-06-28 DIAGNOSIS — F41.0 ANXIETY ATTACK: ICD-10-CM

## 2021-06-28 RX ORDER — LORAZEPAM 0.5 MG/1
0.5 TABLET ORAL 2 TIMES DAILY
Qty: 60 TABLET | Refills: 0 | Status: ON HOLD | OUTPATIENT
Start: 2021-06-28 | End: 2021-07-28 | Stop reason: SDUPTHER

## 2021-07-01 PROBLEM — K62.9 PERIANAL LESION: Status: ACTIVE | Noted: 2021-07-01

## 2021-07-27 ENCOUNTER — APPOINTMENT (EMERGENCY)
Dept: RADIOLOGY | Facility: HOSPITAL | Age: 86
End: 2021-07-27
Payer: MEDICARE

## 2021-07-27 ENCOUNTER — HOSPITAL ENCOUNTER (OUTPATIENT)
Facility: HOSPITAL | Age: 86
Setting detail: OBSERVATION
Discharge: HOME | End: 2021-07-29
Attending: EMERGENCY MEDICINE | Admitting: INTERNAL MEDICINE
Payer: MEDICARE

## 2021-07-27 ENCOUNTER — APPOINTMENT (OUTPATIENT)
Dept: RADIOLOGY | Facility: HOSPITAL | Age: 86
End: 2021-07-27
Payer: MEDICARE

## 2021-07-27 DIAGNOSIS — I10 HTN (HYPERTENSION): Primary | ICD-10-CM

## 2021-07-27 DIAGNOSIS — R47.89 WORD FINDING DIFFICULTY: ICD-10-CM

## 2021-07-27 DIAGNOSIS — G31.84 MILD COGNITIVE IMPAIRMENT: ICD-10-CM

## 2021-07-27 DIAGNOSIS — G45.9 TIA (TRANSIENT ISCHEMIC ATTACK): ICD-10-CM

## 2021-07-27 LAB
ANION GAP SERPL CALCULATED.3IONS-SCNC: 8 MMOL/L (ref 4–13)
APTT PPP: 28 SECONDS (ref 23–37)
ATRIAL RATE: 82 BPM
BUN SERPL-MCNC: 10 MG/DL (ref 5–25)
CALCIUM SERPL-MCNC: 9.6 MG/DL (ref 8.3–10.1)
CHLORIDE SERPL-SCNC: 105 MMOL/L (ref 100–108)
CHOLEST SERPL-MCNC: 210 MG/DL (ref 50–200)
CO2 SERPL-SCNC: 25 MMOL/L (ref 21–32)
CREAT SERPL-MCNC: 0.71 MG/DL (ref 0.6–1.3)
ERYTHROCYTE [DISTWIDTH] IN BLOOD BY AUTOMATED COUNT: 13 % (ref 11.6–15.1)
EST. AVERAGE GLUCOSE BLD GHB EST-MCNC: 100 MG/DL
GFR SERPL CREATININE-BSD FRML MDRD: 78 ML/MIN/1.73SQ M
GLUCOSE SERPL-MCNC: 84 MG/DL (ref 65–140)
GLUCOSE SERPL-MCNC: 87 MG/DL (ref 65–140)
HBA1C MFR BLD: 5.1 %
HCT VFR BLD AUTO: 41.1 % (ref 34.8–46.1)
HDLC SERPL-MCNC: 80 MG/DL
HGB BLD-MCNC: 14.2 G/DL (ref 11.5–15.4)
INR PPP: 1 (ref 0.84–1.19)
LDLC SERPL CALC-MCNC: 111 MG/DL (ref 0–100)
MCH RBC QN AUTO: 32.9 PG (ref 26.8–34.3)
MCHC RBC AUTO-ENTMCNC: 34.5 G/DL (ref 31.4–37.4)
MCV RBC AUTO: 95 FL (ref 82–98)
NONHDLC SERPL-MCNC: 130 MG/DL
P AXIS: 79 DEGREES
PLATELET # BLD AUTO: 231 THOUSANDS/UL (ref 149–390)
PMV BLD AUTO: 10.9 FL (ref 8.9–12.7)
POTASSIUM SERPL-SCNC: 4.8 MMOL/L (ref 3.5–5.3)
PR INTERVAL: 178 MS
PROTHROMBIN TIME: 13.2 SECONDS (ref 11.6–14.5)
QRS AXIS: -11 DEGREES
QRSD INTERVAL: 88 MS
QT INTERVAL: 346 MS
QTC INTERVAL: 404 MS
RBC # BLD AUTO: 4.31 MILLION/UL (ref 3.81–5.12)
SARS-COV-2 RNA RESP QL NAA+PROBE: NEGATIVE
SODIUM SERPL-SCNC: 138 MMOL/L (ref 136–145)
T WAVE AXIS: 53 DEGREES
T4 FREE SERPL-MCNC: 1.03 NG/DL (ref 0.76–1.46)
TRIGL SERPL-MCNC: 96 MG/DL
TROPONIN I SERPL-MCNC: <0.02 NG/ML
TSH SERPL DL<=0.05 MIU/L-ACNC: 0.06 UIU/ML (ref 0.36–3.74)
VENTRICULAR RATE: 82 BPM
WBC # BLD AUTO: 6.38 THOUSAND/UL (ref 4.31–10.16)

## 2021-07-27 PROCEDURE — 70498 CT ANGIOGRAPHY NECK: CPT

## 2021-07-27 PROCEDURE — 70551 MRI BRAIN STEM W/O DYE: CPT

## 2021-07-27 PROCEDURE — 93010 ELECTROCARDIOGRAM REPORT: CPT | Performed by: INTERNAL MEDICINE

## 2021-07-27 PROCEDURE — 84443 ASSAY THYROID STIM HORMONE: CPT | Performed by: STUDENT IN AN ORGANIZED HEALTH CARE EDUCATION/TRAINING PROGRAM

## 2021-07-27 PROCEDURE — 83036 HEMOGLOBIN GLYCOSYLATED A1C: CPT | Performed by: STUDENT IN AN ORGANIZED HEALTH CARE EDUCATION/TRAINING PROGRAM

## 2021-07-27 PROCEDURE — 99285 EMERGENCY DEPT VISIT HI MDM: CPT

## 2021-07-27 PROCEDURE — G1004 CDSM NDSC: HCPCS

## 2021-07-27 PROCEDURE — 85610 PROTHROMBIN TIME: CPT | Performed by: EMERGENCY MEDICINE

## 2021-07-27 PROCEDURE — 99291 CRITICAL CARE FIRST HOUR: CPT | Performed by: EMERGENCY MEDICINE

## 2021-07-27 PROCEDURE — 84439 ASSAY OF FREE THYROXINE: CPT | Performed by: STUDENT IN AN ORGANIZED HEALTH CARE EDUCATION/TRAINING PROGRAM

## 2021-07-27 PROCEDURE — 93005 ELECTROCARDIOGRAM TRACING: CPT

## 2021-07-27 PROCEDURE — 85730 THROMBOPLASTIN TIME PARTIAL: CPT | Performed by: EMERGENCY MEDICINE

## 2021-07-27 PROCEDURE — 82948 REAGENT STRIP/BLOOD GLUCOSE: CPT

## 2021-07-27 PROCEDURE — 84484 ASSAY OF TROPONIN QUANT: CPT | Performed by: EMERGENCY MEDICINE

## 2021-07-27 PROCEDURE — 70496 CT ANGIOGRAPHY HEAD: CPT

## 2021-07-27 PROCEDURE — U0005 INFEC AGEN DETEC AMPLI PROBE: HCPCS | Performed by: EMERGENCY MEDICINE

## 2021-07-27 PROCEDURE — 85027 COMPLETE CBC AUTOMATED: CPT | Performed by: EMERGENCY MEDICINE

## 2021-07-27 PROCEDURE — 36415 COLL VENOUS BLD VENIPUNCTURE: CPT

## 2021-07-27 PROCEDURE — 80061 LIPID PANEL: CPT | Performed by: STUDENT IN AN ORGANIZED HEALTH CARE EDUCATION/TRAINING PROGRAM

## 2021-07-27 PROCEDURE — U0003 INFECTIOUS AGENT DETECTION BY NUCLEIC ACID (DNA OR RNA); SEVERE ACUTE RESPIRATORY SYNDROME CORONAVIRUS 2 (SARS-COV-2) (CORONAVIRUS DISEASE [COVID-19]), AMPLIFIED PROBE TECHNIQUE, MAKING USE OF HIGH THROUGHPUT TECHNOLOGIES AS DESCRIBED BY CMS-2020-01-R: HCPCS | Performed by: EMERGENCY MEDICINE

## 2021-07-27 PROCEDURE — 80048 BASIC METABOLIC PNL TOTAL CA: CPT | Performed by: EMERGENCY MEDICINE

## 2021-07-27 PROCEDURE — 99205 OFFICE O/P NEW HI 60 MIN: CPT | Performed by: PHYSICIAN ASSISTANT

## 2021-07-27 RX ORDER — LORAZEPAM 0.5 MG/1
0.5 TABLET ORAL ONCE
Status: COMPLETED | OUTPATIENT
Start: 2021-07-28 | End: 2021-07-27

## 2021-07-27 RX ORDER — ATORVASTATIN CALCIUM 40 MG/1
40 TABLET, FILM COATED ORAL EVERY EVENING
Status: DISCONTINUED | OUTPATIENT
Start: 2021-07-27 | End: 2021-07-29 | Stop reason: HOSPADM

## 2021-07-27 RX ORDER — PANTOPRAZOLE SODIUM 40 MG/1
40 TABLET, DELAYED RELEASE ORAL
Status: DISCONTINUED | OUTPATIENT
Start: 2021-07-28 | End: 2021-07-29 | Stop reason: HOSPADM

## 2021-07-27 RX ORDER — HYDRALAZINE HYDROCHLORIDE 20 MG/ML
5 INJECTION INTRAMUSCULAR; INTRAVENOUS EVERY 6 HOURS PRN
Status: DISCONTINUED | OUTPATIENT
Start: 2021-07-27 | End: 2021-07-29 | Stop reason: HOSPADM

## 2021-07-27 RX ORDER — ASPIRIN 325 MG
325 TABLET ORAL ONCE
Status: COMPLETED | OUTPATIENT
Start: 2021-07-27 | End: 2021-07-27

## 2021-07-27 RX ORDER — ASPIRIN 81 MG/1
81 TABLET, CHEWABLE ORAL DAILY
Status: DISCONTINUED | OUTPATIENT
Start: 2021-07-27 | End: 2021-07-29 | Stop reason: HOSPADM

## 2021-07-27 RX ADMIN — ENOXAPARIN SODIUM 40 MG: 40 INJECTION SUBCUTANEOUS at 19:06

## 2021-07-27 RX ADMIN — ASPIRIN 325 MG ORAL TABLET 325 MG: 325 PILL ORAL at 13:52

## 2021-07-27 RX ADMIN — IOHEXOL 85 ML: 350 INJECTION, SOLUTION INTRAVENOUS at 12:12

## 2021-07-27 RX ADMIN — LORAZEPAM 0.5 MG: 0.5 TABLET ORAL at 23:54

## 2021-07-27 RX ADMIN — ATORVASTATIN CALCIUM 40 MG: 40 TABLET, FILM COATED ORAL at 19:06

## 2021-07-27 NOTE — PLAN OF CARE
Problem: MOBILITY - ADULT  Goal: Maintain or return to baseline ADL function  Description: INTERVENTIONS:  -  Assess patient's ability to carry out ADLs; assess patient's baseline for ADL function and identify physical deficits which impact ability to perform ADLs (bathing, care of mouth/teeth, toileting, grooming, dressing, etc )  - Assess/evaluate cause of self-care deficits   - Assess range of motion  - Assess patient's mobility; develop plan if impaired  - Assess patient's need for assistive devices and provide as appropriate  - Encourage maximum independence but intervene and supervise when necessary  - Involve family in performance of ADLs  - Assess for home care needs following discharge   - Consider OT consult to assist with ADL evaluation and planning for discharge  - Provide patient education as appropriate  Outcome: Progressing  Goal: Maintains/Returns to pre admission functional level  Description: INTERVENTIONS:  - Perform BMAT or MOVE assessment daily    - Set and communicate daily mobility goal to care team and patient/family/caregiver  - Collaborate with rehabilitation services on mobility goals if consulted  - Perform Range of Motion 2 times a day  - Reposition patient every 2 hours    - Dangle patient 2 times a day  - Stand patient 2 times a day  - Ambulate patient 2 times a day  - Out of bed to chair 2 times a day   - Out of bed for meals 2 times a day  - Out of bed for toileting  - Record patient progress and toleration of activity level   Outcome: Progressing     Problem: PAIN - ADULT  Goal: Verbalizes/displays adequate comfort level or baseline comfort level  Description: Interventions:  - Encourage patient to monitor pain and request assistance  - Assess pain using appropriate pain scale  - Administer analgesics based on type and severity of pain and evaluate response  - Implement non-pharmacological measures as appropriate and evaluate response  - Consider cultural and social influences on pain and pain management  - Notify physician/advanced practitioner if interventions unsuccessful or patient reports new pain  Outcome: Progressing     Problem: INFECTION - ADULT  Goal: Absence or prevention of progression during hospitalization  Description: INTERVENTIONS:  - Assess and monitor for signs and symptoms of infection  - Monitor lab/diagnostic results  - Monitor all insertion sites, i e  indwelling lines, tubes, and drains  - Monitor endotracheal if appropriate and nasal secretions for changes in amount and color  - Antonito appropriate cooling/warming therapies per order  - Administer medications as ordered  - Instruct and encourage patient and family to use good hand hygiene technique  - Identify and instruct in appropriate isolation precautions for identified infection/condition  Outcome: Progressing  Goal: Absence of fever/infection during neutropenic period  Description: INTERVENTIONS:  - Monitor WBC    Outcome: Progressing     Problem: SAFETY ADULT  Goal: Maintain or return to baseline ADL function  Description: INTERVENTIONS:  -  Assess patient's ability to carry out ADLs; assess patient's baseline for ADL function and identify physical deficits which impact ability to perform ADLs (bathing, care of mouth/teeth, toileting, grooming, dressing, etc )  - Assess/evaluate cause of self-care deficits   - Assess range of motion  - Assess patient's mobility; develop plan if impaired  - Assess patient's need for assistive devices and provide as appropriate  - Encourage maximum independence but intervene and supervise when necessary  - Involve family in performance of ADLs  - Assess for home care needs following discharge   - Consider OT consult to assist with ADL evaluation and planning for discharge  - Provide patient education as appropriate  Outcome: Progressing  Goal: Maintains/Returns to pre admission functional level  Description: INTERVENTIONS:  - Perform BMAT or MOVE assessment daily    - Set and communicate daily mobility goal to care team and patient/family/caregiver  - Collaborate with rehabilitation services on mobility goals if consulted  - Perform Range of Motion 2 times a day  - Reposition patient every 2 hours    - Dangle patient 2 times a day  - Stand patient 2 times a day  - Ambulate patient 2 times a day  - Out of bed to chair 2 times a day   - Out of bed for meals 2 times a day  - Out of bed for toileting  - Record patient progress and toleration of activity level   Outcome: Progressing  Goal: Patient will remain free of falls  Description: INTERVENTIONS:  - Educate patient/family on patient safety including physical limitations  - Instruct patient to call for assistance with activity   - Consult OT/PT to assist with strengthening/mobility   - Keep Call bell within reach  - Keep bed low and locked with side rails adjusted as appropriate  - Keep care items and personal belongings within reach  - Initiate and maintain comfort rounds  - Make Fall Risk Sign visible to staff  - Offer Toileting every 2 Hours, in advance of need  - Initiate/Maintain bed alarm  - Apply yellow socks and bracelet for high fall risk patients  - Consider moving patient to room near nurses station  Outcome: Progressing     Problem: DISCHARGE PLANNING  Goal: Discharge to home or other facility with appropriate resources  Description: INTERVENTIONS:  - Identify barriers to discharge w/patient and caregiver  - Arrange for needed discharge resources and transportation as appropriate  - Identify discharge learning needs (meds, wound care, etc )  - Arrange for interpretive services to assist at discharge as needed  - Refer to Case Management Department for coordinating discharge planning if the patient needs post-hospital services based on physician/advanced practitioner order or complex needs related to functional status, cognitive ability, or social support system  Outcome: Progressing     Problem: Knowledge Deficit  Goal: Patient/family/caregiver demonstrates understanding of disease process, treatment plan, medications, and discharge instructions  Description: Complete learning assessment and assess knowledge base  Interventions:  - Provide teaching at level of understanding  - Provide teaching via preferred learning methods  Outcome: Progressing     Problem: Neurological Deficit  Goal: Neurological status is stable or improving  Description: Interventions:  - Monitor and assess patient's level of consciousness, motor function, sensory function, and level of assistance needed for ADLs  - Monitor and report changes from baseline  Collaborate with interdisciplinary team to initiate plan and implement interventions as ordered  - Provide and maintain a safe environment  - Consider seizure precautions  - Consider fall precautions  - Consider aspiration precautions  - Consider bleeding precautions  Outcome: Progressing     Problem: Activity Intolerance/Impaired Mobility  Goal: Mobility/activity is maintained at optimum level for patient  Description: Interventions:  - Assess and monitor patient  barriers to mobility and need for assistive/adaptive devices  - Assess patient's emotional response to limitations  - Collaborate with interdisciplinary team and initiate plans and interventions as ordered  - Encourage independent activity per ability   - Maintain proper body alignment  - Perform active/passive rom as tolerated/ordered  - Plan activities to conserve energy   - Turn patient as appropriate  Outcome: Progressing     Problem: Communication Impairment  Goal: Ability to express needs and understand communication  Description: Assess patient's communication skills and ability to understand information  Patient will demonstrate use of effective communication techniques, alternative methods of communication and understanding even if not able to speak       - Encourage communication and provide alternate methods of communication as needed  - Collaborate with case management/ for discharge needs  - Include patient/family/caregiver in decisions related to communication  Outcome: Progressing     Problem: Potential for Aspiration  Goal: Non-ventilated patient's risk of aspiration is minimized  Description: Assess and monitor vital signs, respiratory status, and labs (WBC)  Monitor for signs of aspiration (tachypnea, cough, rales, wheezing, cyanosis, fever)  - Assess and monitor patient's ability to swallow  - Place patient up in chair to eat if possible  - HOB up at 90 degrees to eat if unable to get patient up into chair   - Supervise patient during oral intake  - Instruct patient/ family to take small bites  - Instruct patient/ family to take small single sips when taking liquids  - Follow patient-specific strategies generated by speech pathologist   Outcome: Progressing  Goal: Ventilated patient's risk of aspiration is minimized  Description: Assess and monitor vital signs, respiratory status, airway cuff pressure, and labs (WBC)  Monitor for signs of aspiration (tachypnea, cough, rales, wheezing, cyanosis, fever)  - Elevate head of bed 30 degrees if patient has tube feeding   - Monitor tube feeding  Outcome: Progressing     Problem: Nutrition  Goal: Nutrition/Hydration status is improving  Description: Monitor and assess patient's nutrition/hydration status for malnutrition (ex- brittle hair, bruises, dry skin, pale skin and conjunctiva, muscle wasting, smooth red tongue, and disorientation)  Collaborate with interdisciplinary team and initiate plan and interventions as ordered  Monitor patient's weight and dietary intake as ordered or per policy  Utilize nutrition screening tool and intervene per policy  Determine patient's food preferences and provide high-protein, high-caloric foods as appropriate       - Assist patient with eating   - Allow adequate time for meals   - Encourage patient to take dietary supplement as ordered  - Collaborate with clinical nutritionist   - Include patient/family/caregiver in decisions related to nutrition    Outcome: Progressing

## 2021-07-27 NOTE — ED ATTENDING ATTESTATION
7/27/2021  IPattie MD, saw and evaluated the patient  I have discussed the patient with the resident/non-physician practitioner and agree with the resident's/non-physician practitioner's findings, Plan of Care, and MDM as documented in the resident's/non-physician practitioner's note, except where noted  All available labs and Radiology studies were reviewed  I was present for key portions of any procedure(s) performed by the resident/non-physician practitioner and I was immediately available to provide assistance  At this point I agree with the current assessment done in the Emergency Department  I have conducted an independent evaluation of this patient a history and physical is as follows:  Patient presents to the emergency department as a pre-hospital stroke alert  According the family the patient was in her normal state of health until approximately 9:30 a m  When she developed word-finding difficulty, slight confusion, and left upper extremity weakness  EMS was summoned and brought the patient to the hospital   A pre-hospital stroke alert was called and the neurology team was in the emergency department that time the patient arrived  The patient, upon arrival, denies any complaint  Physical exam demonstrates an elderly female no acute distress  HEENT exam was normal without evidence of craniofacial trauma  The neck was supple nontender  The pupils are reactive to light  Lungs are clear with equal breath sounds  The heart had a regular rate rhythm  The abdomen is soft and nontender  All extremities are symmetric and nontender  There is no gross weakness appreciated in any extremity  The patient was oriented to person place but did not know the month    The patient's speech appeared to be normal     ED Course         Critical Care Time  Procedures

## 2021-07-27 NOTE — CONSULTS
Consultation - Stroke   Evangelist Vasquez 81 y o  female MRN: [id number]  Unit/Bed#: ED 24 Encounter: [id number]      Assessment/Plan     Word finding difficulty  Assessment & Plan  50-year-old female with uncontrolled hypertension, dyslipidemia, history of diverticulitis, anxiety, GERD, probable cognitive impairment, presents with word-finding difficulties, confusion, and left upper extremity weakness  Patient with word-finding difficulty and reported confusion prior to presentation, as well as left upper extremity weakness as reported by EMS  On examination in the ED, patient had mild speech difficulty with a single paraphasic error, could not state the current month or year, but had no weakness, sensory abnormality, or other neurologic symptoms  Patient's baseline unclear as reports from patient's daughters varied  It seems she may have some difficulty with word finding at times, and may be developing some degree of cognitive impairment/forgetfulness (please see HPI for details)  TPA not administered as patient's symptoms were very mild/rapidly improving  NIH stroke scale 2 for mild word-finding difficulty and inability to name current month  Presenting symptoms may be related to uncontrolled hypertension versus TIA/stroke  Blood pressure on arrival was 204/99, and as high as 608 systolic in the ED  CT head demonstrated no acute changes  CTA head/neck demonstrated no flow consequential stenosis or LVO  Patient was not on any antiplatelet or anticoagulant at baseline  Plan:  -Admit to stroke pathway  -MRI brain without contrast   -2D echocardiogram with bubble study   -Check hemoglobin A1c, lipid panel, TSH   -loaded with aspirin 325 mg in the ED  Continue 81 mg aspirin daily   -Initiate atorvastatin 40 mg   -Telemetry  -PT/OT/speech therapy   -gradual BP reduction, with goal 046-120 systolic for now  -Frequent neuro checks   -Continue to monitor and notify with changes        HTN (hypertension)  Assessment & Plan  Systolic blood pressure as high as 220 on presentation  Gradual reduction as noted above  TPA Decision: Patient not a TPA candidate  Symptoms resolved/clearly non disabling  Recommendations for outpatient neurological follow up have yet to be determined  History of Present Illness     Reason for Consult / Principal Problem:  Word-finding difficulty, confusion, left upper extremity weakness  Hx and PE limited by:  Patient limited historian  Patient last known well: 0930  Stroke alert called: 1150  Neurology time of arrival: 1152  HPI: Mei Ochoa is a 81 y o  right handed female with uncontrolled hypertension, dyslipidemia, history of diverticulitis, anxiety, GERD, probable cognitive impairment, presents with word-finding difficulties, confusion, and left upper extremity weakness  Patient was reportedly in her usual state of health upon awakening this morning  Around 930 or 1000, her daughter noticed patient was having difficulty with word finding and had difficulty answering questions, appearing confused  EMS was called, who noted mild left upper extremity weakness  There was no reported dysarthria, facial droop, or sensory abnormality  Blood pressure on arrival was 204/99, and as high as 204 systolic in the ED  CT head demonstrated no acute changes  CTA head/neck demonstrated no flow consequential stenosis or LVO  Patient was not on any antiplatelet or anticoagulant at baseline  On examination, patient did appear to have very mild expressive aphasia, with paraphasic error (called a hammock a hassock"), and could not state the current month and incorrectly stated the current year as 2001  She was able to read and repeat without error  There was no weakness or sensory asymmetry  Per discussion with patient's daughters, patient has had some cognitive difficulties over the past several months to a year    In 1 instance, patient put water on to boil and forgot about the pot  In another, family members found a banana in the mayonnaise jar, and found a quesadilla in her top dresser drawer  She has at times lost her train of thought or had difficulty finishing her sentences and at times would not state the correct year  She does not drive  Inpatient consult to Neurology  Consult performed by: Sushil Mcfarland PA-C  Consult ordered by: Pepe Vásquez MD          Review of Systems   Unable to perform ROS: Acuity of condition       Historical Information   Past Medical History:   Diagnosis Date    Allergic rhinitis     Anxiety     Diverticulitis of large intestine without perforation or abscess without bleeding     Dysthymic disorder     Gastro-esophageal reflux disease without esophagitis     HLD (hyperlipidemia)     Hypertension     Osteopenia     Palpitations     Paresthesia      Past Surgical History:   Procedure Laterality Date    APPENDECTOMY      CHOLECYSTECTOMY      COLON SURGERY      FL INJECTION RIGHT HIP (NON ARTHROGRAM)  8/28/2019    HYSTERECTOMY       Social History   Social History     Substance and Sexual Activity   Alcohol Use Not Currently     Social History     Substance and Sexual Activity   Drug Use Not Currently     E-Cigarette/Vaping     E-Cigarette/Vaping Substances     Social History     Tobacco Use   Smoking Status Former Smoker   Smokeless Tobacco Never Used     Family History:   Family History   Problem Relation Age of Onset    Hypertension Mother     Hypertension Father        Review of previous medical records was completed  Meds/Allergies   PTA meds:   Prior to Admission Medications   Prescriptions Last Dose Informant Patient Reported? Taking?    LORazepam (ATIVAN) 0 5 mg tablet   No No   Sig: Take 1 tablet (0 5 mg total) by mouth 2 (two) times a day   Latanoprost 0 005 % EMUL  Self Yes No   OMEPRAZOLE PO  Self Yes No   Sig: Take 20 mg by mouth daily   amLODIPine (NORVASC) 5 mg tablet  Self No No Sig: Take 1 tablet (5 mg total) by mouth daily   latanoprost (XALATAN) 0 005 % ophthalmic solution  Self Yes No   metoprolol tartrate (LOPRESSOR) 25 mg tablet  Self No No   Sig: TAKE ONE TABLET BY MOUTH TWICE DAILY    omeprazole (PriLOSEC) 20 mg delayed release capsule  Self No No   Sig: Take 1 capsule (20 mg total) by mouth daily      Facility-Administered Medications: None       Allergies   Allergen Reactions    Penicillin G Benzathine        Objective   Vitals:Blood pressure (!) 175/78, pulse 76, temperature 97 5 °F (36 4 °C), temperature source Tympanic, resp  rate 18, weight 53 4 kg (117 lb 11 6 oz), SpO2 97 %  ,Body mass index is 20 85 kg/m²  No intake or output data in the 24 hours ending 07/27/21 1428    Invasive Devices: Invasive Devices     Peripheral Intravenous Line            Peripheral IV 07/27/21 Left Antecubital <1 day    Peripheral IV 07/27/21 Right Antecubital <1 day                Physical Exam   General:  Patient is well-developed, well-nourished, and in no acute distress  HEENT:  Head normocephalic  Eyes anicteric  Cardiovascular:  With regular rhythm  Lungs:  Normal effort  Nonlabored breathing  Extremities:  With no significant edema  Skin: No rashes  Neurologic Exam  Mental Status:  Patient is alert, pleasantly interactive, and appropriately conversational   Not dysarthric  Able to name all simple objects on NIH Stroke Scale cards except for hammock, which she called a hassock  Able to read and repeat without error  Could not state the current month but knows it is summer, and incorrectly stated the current year as 2001  Was able to state her full name but incorrectly stated her age as 81, and then 81  Gait deferred for safety  Cranial Nerves:   II: Visual fields full to confrontation  Pupils equal, round, reactive to light with normal accomodation  Cannot visualize optic fundi  III,IV,VI: extraocular movements intact with no nystagmus     V: Sensation in the V1 through V3 distributions intact to light touch bilaterally  VII: Face is symmetric with no weakness noted  VIII: Audition intact to finger rub bilaterally  IX/X: Uvula midline  Soft palate elevation symmetric  XII: Tongue midline with no atrophy or fasciculations with appropriate movement  Coordination:  Accurate with finger-to-nose and heel-to-shin maneuvers bilaterally  Motor testing with no upper or lower extremity drift  Sensory testing grossly intact to light touch throughout  No extinction with double simultaneous stimulation  NIHSS:  1a Level of Consciousness: 0 = Alert   1b  LOC Questions: 1 = Answers one correctly   1c  LOC Commands: 0 = Obeys both correctly   2  Best Gaze: 0 = Normal   3  Visual: 0 = No visual field loss   4  Facial Palsy: 0=Normal symmetric movement   5a  Motor Right Arm: 0=No drift, limb holds 90 (or 45) degrees for full 10 seconds   5b  Motor Left Arm: 0=No drift, limb holds 90 (or 45) degrees for full 10 seconds   6a  Motor Right Le=No drift, limb holds 90 (or 45) degrees for full 10 seconds   6b  Motor Left Le=No drift, limb holds 90 (or 45) degrees for full 10 seconds   7  Limb Ataxia:  0=Absent   8  Sensory: 0=Normal; no sensory loss   9  Best Language:  1=Mild to moderate aphasia; some obvious loss of fluency or facility of comprehension without significant limitation on ideas expressed or form of expression  10  Dysarthria: 0=Normal articulation   11  Extinction and Inattention (formerly Neglect): 0=No abnormality   Total Score: 2     Time NIHSS was completed: 1215  Modified Laura Score:  1 (No significant disability   Able to carry out all usual activities, despite some symptoms)    Lab Results:   CBC:   Results from last 7 days   Lab Units 21  1159   WBC Thousand/uL 6 38   RBC Million/uL 4 31   HEMOGLOBIN g/dL 14 2   HEMATOCRIT % 41 1   MCV fL 95   PLATELETS Thousands/uL 231   , BMP/CMP:   Results from last 7 days   Lab Units 21  1159 SODIUM mmol/L 138   POTASSIUM mmol/L 4 8   CHLORIDE mmol/L 105   CO2 mmol/L 25   BUN mg/dL 10   CREATININE mg/dL 0 71   CALCIUM mg/dL 9 6   EGFR ml/min/1 73sq m 78   , Coagulation:   Results from last 7 days   Lab Units 07/27/21  1159   INR  1 00     Imaging Studies: I have personally reviewed pertinent reports   , I have personally reviewed pertinent films in PACS and I have personally reviewed pertinent films in PACS with a Radiologist  CTH, CTA head/neck  EKG, Pathology, and Other Studies: I have personally reviewed pertinent reports  VTE Prophylaxis: Sequential compression device Angella Pruett     Code Status: Level 1 - Full Code  Advance Directive and Living Will:      Power of :    POLST:      Counseling / Coordination of Care  Total Critical Care time spent 60 minutes excluding procedures, teaching and family updates

## 2021-07-27 NOTE — PROGRESS NOTES
INTERNAL MEDICINE RESIDENCY SENIOR ADMISSION NOTE     Name: Cooper Carmona   Age & Sex: 81 y o  female   MRN: [id number]  Unit/Bed#: [facility] 716-37   Encounter: [id number]  Primary Care Provider: Sallie Davis MD    Admit to team: SOD Team B     Patient seen and examined  Reviewed H&P per JESES Narayan  Agree with the assessment and plan with any exception/addition as noted below: Active Problems:    HTN (hypertension)    Word finding difficulty    Ms Graham Villagomez Samples is an 67-year-old male with past medical history of hypertension, GERD, and hyperlipidemia who presented to [facility] ED on 07/27/2021 as a stroke alert after developing word-finding difficulties at approximately 10:00 AM on day of admission  Patient was brought in by family and at time of ED arrival patient's symptoms had resolved  Initial vital signs as follows:  /99, HR 64, and RR 14 with an SpO2 of 95% on room air  On initial physical examination, the patient was sitting up in bed comfort acute distress or visible discomfort  Regular rate and rhythm and lungs clear to auscultation bilaterally and diffusely  No signs of lower extremity edema  No focal neurologic deficit and intact muscle strength and sensation in bilateral upper extremities  Labs unremarkable including stable creatinine, electrolytes WNL, and negative troponin  EKG demonstrating normal sinus rhythm with no sinus arrhythmia  Telemetry reveals NSR with frequent PVCs  CT head stroke alert completed and with no acute intracranial abnormality and with presence of only microangiopathic changes  CTA head and neck with no significant stenosis of the cervical carotid or vertebral arteries but evidence of focal stenosis at left A1 segment and no intracranial stenosis, vessel occlusion, or aneurysm  Patient was seen and evaluated by the Neurology team while in the emergency department    Patient not a tPA candidate at at time but was given a 1 time dose of full-dose aspirin 325 mg  Patient to be admitted to the general medicine service under observation on the stroke pathway    Per discussions with the patient, patient will remain a LEVEL 3 - DNR/DNI    Plan:  # Stroke-Like Symptoms:  Admit on Stroke Pathway   · Begin Lipitor 40 mg and ASA 81 mg   · PT/OT/Speech Therapy   · Echo ordered  · Inpatient brain MRI ordered  · SBP goals of 180-200   · Continue frequent neuro checks  · Monitor on telemetry   · Neurology consulted and appreciate recommendations  · DVT PPx: Lovenox SC    # Hypertension:   · Per neurology, SBP goals of 180-200  · Hold home antihypertensives  · PRN Hydralazine for SBP greater than 200  · Monitor with routine vitals signs     Code Status: Level 3 - DNAR and DNI  Admission Status: OBSERVATION  Disposition: Patient requires Med/Surg with Telemetry  Expected Length of Stay:  1 midnight stay

## 2021-07-27 NOTE — ED PROVIDER NOTES
Family History   Problem Relation Age of Onset    Hypertension Mother     Hypertension Father      I have reviewed and agree with the history as documented  E-Cigarette/Vaping     E-Cigarette/Vaping Substances     Social History     Tobacco Use    Smoking status: Former Smoker    Smokeless tobacco: Never Used   Substance Use Topics    Alcohol use: Not Currently    Drug use: Not Currently        Review of Systems   All other systems reviewed and are negative  Physical Exam  ED Triage Vitals   Temperature Pulse Respirations Blood Pressure SpO2   07/27/21 1229 07/27/21 1200 07/27/21 1200 07/27/21 1200 07/27/21 1200   97 5 °F (36 4 °C) 64 14 (!) 204/99 95 %      Temp Source Heart Rate Source Patient Position - Orthostatic VS BP Location FiO2 (%)   07/27/21 1229 07/27/21 1200 -- -- --   Tympanic Monitor         Pain Score       --                    Orthostatic Vital Signs  Vitals:    07/27/21 1215 07/27/21 1230 07/27/21 1245 07/27/21 1300   BP: (!) 220/91 (!) 184/75 (!) 183/74 162/72   Pulse: 90 82 82 76       Physical Exam   PHYSICAL EXAM    Constitutional:  Well developed, well nourished, no acute distress, non-toxic appearance    HEENT:  Conjunctiva normal  Oropharynx moist  Respiratory:  No respiratory distress, normal breath sounds  Cardiovascular:  Normal rate, normal rhythm, no murmurs  GI:  Soft, nondistended, normal bowel sounds, nontender  :  No costovertebral angle tenderness   Musculoskeletal:  No edema, no tenderness, no deformities     Integument:  Well hydrated, no rash   Lymphatic:  No lymphadenopathy noted   Neurologic:  Alert & oriented, normal motor function, normal sensory function, no focal deficits noted, bilateral upper extremity strength equal, bilateral lower extremity strength equal, mild word finding difficult   Psychiatric:  Speech and behavior appropriate      ED Medications  Medications   aspirin tablet 325 mg (has no administration in time range)   iohexol (OMNIPAQUE) 350 MG/ML injection (MULTI-DOSE) 85 mL (85 mL Intravenous Given 7/27/21 1212)       Diagnostic Studies  Results Reviewed     Procedure Component Value Units Date/Time    Protime-INR [[id number]]  (Normal) Collected: 07/27/21 1159    Lab Status: Final result Specimen: Blood from Arm, Right Updated: 07/27/21 1250     Protime 13 2 seconds      INR 1 00    APTT [[id number]]  (Normal) Collected: 07/27/21 1159    Lab Status: Final result Specimen: Blood from Arm, Right Updated: 07/27/21 1250     PTT 28 seconds     Troponin I [[id number]]  (Normal) Collected: 07/27/21 1159    Lab Status: Final result Specimen: Blood from Arm, Right Updated: 07/27/21 1250     Troponin I <0 02 ng/mL     Basic metabolic panel [[id number]] Collected: 07/27/21 1159    Lab Status: Final result Specimen: Blood from Arm, Right Updated: 07/27/21 1244     Sodium 138 mmol/L      Potassium 4 8 mmol/L      Chloride 105 mmol/L      CO2 25 mmol/L      ANION GAP 8 mmol/L      BUN 10 mg/dL      Creatinine 0 71 mg/dL      Glucose 87 mg/dL      Calcium 9 6 mg/dL      eGFR 78 ml/min/1 73sq m     Narrative:      [place] guidelines for Chronic Kidney Disease (CKD):     Stage 1 with normal or high GFR (GFR > 90 mL/min/1 73 square meters)    Stage 2 Mild CKD (GFR = 60-89 mL/min/1 73 square meters)    Stage 3A Moderate CKD (GFR = 45-59 mL/min/1 73 square meters)    Stage 3B Moderate CKD (GFR = 30-44 mL/min/1 73 square meters)    Stage 4 Severe CKD (GFR = 15-29 mL/min/1 73 square meters)    Stage 5 End Stage CKD (GFR <15 mL/min/1 73 square meters)  Note: GFR calculation is accurate only with a steady state creatinine    CBC and Platelet [[id number]]  (Normal) Collected: 07/27/21 1159    Lab Status: Final result Specimen: Blood from Arm, Right Updated: 07/27/21 1220     WBC 6 38 Thousand/uL      RBC 4 31 Million/uL      Hemoglobin 14 2 g/dL      Hematocrit 41 1 %      MCV 95 fL      MCH 32 9 pg      MCHC 34 5 g/dL      RDW 13 0 % Platelets 273 Thousands/uL      MPV 10 9 fL     Novel Coronavirus (Covid-19),PCR [facility] - 2 hour stat [[id number]] Collected: 07/27/21 1159    Lab Status: In process Specimen: Nares from Nose Updated: 07/27/21 1211    Fingerstick Glucose (POCT) [[id number]]  (Normal) Collected: 07/27/21 1157    Lab Status: Final result Updated: 07/27/21 1200     POC Glucose 84 mg/dl                  CTA stroke alert (head/neck)   Final Result by Delmis Mcgraw MD (07/27 1229)      No significant stenosis of the cervical carotid or vertebral arteries  Focal stenosis left A1 segment  No other intracranial stenosis, vessel occlusion or aneurysm  Findings were directly discussed with [employer] Natans Turner on 7/27/2021 12:27 PM                      Workstation performed: [id number]         CT stroke alert brain   Final Result by Delmis Mcgraw MD (07/27 1229)      No acute intracranial abnormality  Microangiopathic changes  Findings were directly discussed with [employer] Natans Turner on 7/27/2021 12:27 PM       Workstation performed: [id number]               Procedures  Procedures      ED Course                                       MDM  Number of Diagnoses or Management Options  HTN (hypertension)  TIA (transient ischemic attack)  Diagnosis management comments: 71-year-old female presents to ED as stroke alert  Around 10:00 am, the patient had word-finding difficulties along with left-sided arm weakness  In the ED, her symptoms have resolved  CT - No acute intracranial abnormality  Microangiopathic changes  CTA showed - No significant stenosis of the cervical carotid or vertebral arteries  Focal stenosis left A1 segment  No other intracranial stenosis, vessel occlusion or aneurysm  Neurology evaluated the patient, recommended no tPA, recommend full-dose aspirin  My patient has stable vital signs in the ED    Patient discussed with SOD and admitted to their service for stroke pathway       Amount and/or Complexity of Data Reviewed  Clinical lab tests: ordered and reviewed  Tests in the radiology section of CPT®: ordered and reviewed  Discussion of test results with the performing providers: yes  Review and summarize past medical records: yes  Discuss the patient with other providers: yes    Risk of Complications, Morbidity, and/or Mortality  Presenting problems: high  Diagnostic procedures: moderate  Management options: moderate    Patient Progress  Patient progress: improved      Disposition  Final diagnoses:   HTN (hypertension)   TIA (transient ischemic attack)     Time reflects when diagnosis was documented in both MDM as applicable and the Disposition within this note     Time User Action Codes Description Comment    7/27/2021 11:54 AM Jason Castillo Add [I10] HTN (hypertension)     7/27/2021  1:17 PM Brandon Reeder Add [G45 9] TIA (transient ischemic attack)       ED Disposition     ED Disposition Condition Date/Time Comment    Admit Stable Tue Jul 27, 2021  1:22 PM Case was discussed with SOD and the patient's admission status was agreed to be Admission Status: observation status to the service of Dr Dorothea Garcia   Follow-up Information    None         Patient's Medications   Discharge Prescriptions    No medications on file     No discharge procedures on file  PDMP Review       Value Time User    PDMP Reviewed  Yes 5/25/2021  2:10 PM Omar Chavez MD           ED Provider  Attending physically available and evaluated Amarjit Patel  I managed the patient along with the ED Attending      Electronically Signed by         Alyson Oliveros MD  07/27/21 5365       Alyson Oliveros MD  07/27/21 0958

## 2021-07-27 NOTE — H&P
INTERNAL MEDICINE HISTORY AND PHYSICAL  ED 24 SOD Team B     NAME: Abhilash Puente  AGE: 81 y o  SEX: female  : 1935   MRN: [id number]  ENCOUNTER: [id number]    DATE: 2021  TIME: 2:52 PM    Primary Care Physician: César Zavala MD  Admitting Provider: Babatunde Quintanilla MD    Chief complaint: Word finding difficulty    Word finding difficulty  Assessment & Plan  Patient noticed some word finding difficulties while watching the Olympics with her daughter  Patient was hypertensive in the 200s in the ED  She took her metoprolol this am but not the amlodipine  CT was unremarkable  ABCD2 was 4 which is moderate risk  Plan  - Neuro consult, would appreciate recs  - Permissive HTN for 24 hours, holding hypertensive medications   -  Start ASA 81 daily tomorrow  - Ordered MRI, Echo  - Started Lipitor 40 mg   - Checking A1C, Lipid Panel, TSH,  - Monitor on tele for 24 hours   - Frequent Neuro checks         HTN (hypertension)  Assessment & Plan  Home medications are metoprolol and amlodipine  Patient took metoprolol this morning but forgot to take her amlodipine  Plan:  - Allow for permissive hypertension  - Will restart meds after 24 hours         History of Present Illness     Abhilash Puente is a 81 y o  female with past medical history of for HTN, HLD, GERD and chronic constipation who presented with [facility] with word finding difficulties  Patient states that she was sitting and watching the [place] when her daughter noticed that she was having a difficult time verbalizing her words  The daughter also noted some left sided arm weakness however the patient denies this symptom  She continued to have aphasia until she got to the hospital  She says she is getting better but still not at baseline  She said in the last few weeks she has had a couple of episodes of "not feeling like herself" but has never had similar symptoms   She says she is compliant with her medications and took her metoprolol but not the amlopdipine  Her ROS is positive for a slight headache but negative for chest pain, SOB, changes in vision, constipation or diarrhea, or weakness  Patient denies any recent falls  Review of Systems   Review of Systems   Constitutional: Negative  Negative for chills and fever  HENT: Negative  Eyes: Negative  Respiratory: Negative for cough and shortness of breath  Cardiovascular: Negative for chest pain  Gastrointestinal: Negative for abdominal distention, abdominal pain, constipation and diarrhea  Endocrine: Negative  Genitourinary: Negative  Musculoskeletal: Negative for arthralgias  Patient has R hip pain   Skin: Negative for rash  Allergic/Immunologic: Negative  Neurological: Positive for speech difficulty and headaches  Negative for dizziness and weakness  Hematological: Negative  Psychiatric/Behavioral: Negative          Past Medical History     Past Medical History:   Diagnosis Date    Allergic rhinitis     Anxiety     Diverticulitis of large intestine without perforation or abscess without bleeding     Dysthymic disorder     Gastro-esophageal reflux disease without esophagitis     HLD (hyperlipidemia)     Hypertension     Osteopenia     Palpitations     Paresthesia        Past Surgical History     Past Surgical History:   Procedure Laterality Date    APPENDECTOMY      CHOLECYSTECTOMY      COLON SURGERY      FL INJECTION RIGHT HIP (NON ARTHROGRAM)  8/28/2019    HYSTERECTOMY         Social History     Social History     Substance and Sexual Activity   Alcohol Use Not Currently     Social History     Substance and Sexual Activity   Drug Use Not Currently     Social History     Tobacco Use   Smoking Status Former Smoker   Smokeless Tobacco Never Used       Family History     Family History   Problem Relation Age of Onset    Hypertension Mother     Hypertension Father        Medications Prior to Admission     Prior to Admission medications    Medication Sig Start Date End Date Taking? Authorizing Provider   amLODIPine (NORVASC) 5 mg tablet Take 1 tablet (5 mg total) by mouth daily 5/10/21 8/8/21  Maria Elena Esparza MD   latanoprost (XALATAN) 0 005 % ophthalmic solution  2/8/21   Historical Provider, MD   Latanoprost 0 005 % EMUL  7/12/18   Historical Provider, MD   LORazepam (ATIVAN) 0 5 mg tablet Take 1 tablet (0 5 mg total) by mouth 2 (two) times a day 6/28/21   Maria Elena Esparza MD   metoprolol tartrate (LOPRESSOR) 25 mg tablet TAKE ONE TABLET BY MOUTH TWICE DAILY  12/24/20   Maria Elena Esparza MD   omeprazole (PriLOSEC) 20 mg delayed release capsule Take 1 capsule (20 mg total) by mouth daily 5/10/21   Maria Elena Esparza MD   OMEPRAZOLE PO Take 20 mg by mouth daily    Historical Provider, MD       Allergies     Allergies   Allergen Reactions    Penicillin G Benzathine        Objective     Vitals:    07/27/21 1245 07/27/21 1300 07/27/21 1330 07/27/21 1430   BP: (!) 183/74 162/72 (!) 175/78 167/79   Pulse: 82 76 76 86   Resp: 18 20 18 18   Temp:       TempSrc:       SpO2:  97% 97% 98%   Weight:         Body mass index is 20 85 kg/m²  No intake or output data in the 24 hours ending 07/27/21 1452  Invasive Devices     Peripheral Intravenous Line            Peripheral IV 07/27/21 Left Antecubital <1 day    Peripheral IV 07/27/21 Right Antecubital <1 day                Physical Exam    Physical Exam  Constitutional:       Appearance: Normal appearance  HENT:      Head: Normocephalic and atraumatic  Nose: Nose normal       Mouth/Throat:      Mouth: Mucous membranes are moist       Pharynx: Oropharynx is clear  Eyes:      Conjunctiva/sclera: Conjunctivae normal    Cardiovascular:      Rate and Rhythm: Normal rate and regular rhythm  Pulses: Normal pulses  Heart sounds: Normal heart sounds  Pulmonary:      Effort: Pulmonary effort is normal       Breath sounds: Normal breath sounds     Abdominal:      General: Bowel sounds are normal       Palpations: Abdomen is soft  Musculoskeletal:         General: Normal range of motion  Cervical back: Normal range of motion  Comments: Right hip pain    Skin:     General: Skin is warm  Neurological:      Mental Status: She is alert and oriented to person, place, and time  Comments: Some word finding difficulties, Cranial nerves intact  5/5 strength in extremities   AO x 3    Psychiatric:         Mood and Affect: Mood normal          Behavior: Behavior normal          Thought Content: Thought content normal          Judgment: Judgment normal        Lab Results: I have personally reviewed pertinent reports  CBC:   Results from last 7 days   Lab Units 07/27/21  1159   WBC Thousand/uL 6 38   RBC Million/uL 4 31   HEMOGLOBIN g/dL 14 2   HEMATOCRIT % 41 1   MCV fL 95   MCH pg 32 9   MCHC g/dL 34 5   RDW % 13 0   MPV fL 10 9   PLATELETS Thousands/uL 231   , Chemistry Profile:   Results from last 7 days   Lab Units 07/27/21  1159   POTASSIUM mmol/L 4 8   CHLORIDE mmol/L 105   CO2 mmol/L 25   BUN mg/dL 10   CREATININE mg/dL 0 71   CALCIUM mg/dL 9 6   EGFR ml/min/1 73sq m 78       Imaging: I have personally reviewed pertinent films in PACS  CT stroke alert brain    Result Date: 7/27/2021  Narrative: CT BRAIN - STROKE ALERT PROTOCOL INDICATION:   pre hospital stroke alert  COMPARISON:  CT dated 5/21/2011 TECHNIQUE:  CT examination of the brain was performed  In addition to axial images, coronal reformatted images were created and submitted for interpretation  Radiation dose length product (DLP) for this visit:   This examination, like all CT scans performed in the [facility], was performed utilizing techniques to minimize radiation dose exposure, including the use of iterative reconstruction  and automated exposure control  IMAGE QUALITY:  Diagnostic   FINDINGS:  PARENCHYMA: Decreased attenuation is noted in periventricular and subcortical white matter demonstrating an appearance that is statistically most likely to represent mild microangiopathic change  No CT signs of acute infarction  No intracranial mass, mass effect or midline shift  No acute parenchymal hemorrhage  Intracranial atherosclerotic calcifications  VENTRICLES AND EXTRA-AXIAL SPACES:  Normal for patient's age  VISUALIZED ORBITS AND PARANASAL SINUSES:  Bilateral lens replacement  CALVARIUM AND EXTRACRANIAL SOFT TISSUES:   Normal      Impression: No acute intracranial abnormality  Microangiopathic changes  Findings were directly discussed with Elmira Kendall on 7/27/2021 12:27 PM  Workstation performed: [id number]     CTA stroke alert (head/neck)    Result Date: 7/27/2021  Narrative: CTA NECK AND BRAIN WITH CONTRAST INDICATION: prehospital stroke alert COMPARISON:   Immediately preceding noncontrast head CT  TECHNIQUE:   Post contrast imaging was performed after administration of iodinated contrast through the neck and brain  Post contrast axial 0 625 mm images timed to opacify the arterial system  3D rendering was performed on an independent workstation  MIP reconstructions performed  Coronal reconstructions were performed of the noncontrast portion of the brain  Radiation dose length product (DLP) for this visit:  460 61 mGy-cm   This examination, like all CT scans performed in the [facility], was performed utilizing techniques to minimize radiation dose exposure, including the use of iterative  reconstruction and automated exposure control  IV Contrast:  85 mL of iohexol (OMNIPAQUE)  IMAGE QUALITY:   Diagnostic FINDINGS: CERVICAL VASCULATURE AORTIC ARCH AND GREAT VESSELS:  Mild atherosclerotic disease of the arch, proximal great vessels and visualized subclavian vessels  No significant stenosis  RIGHT VERTEBRAL ARTERY CERVICAL SEGMENT:  Normal origin  The vessel is normal in caliber throughout the neck  LEFT VERTEBRAL ARTERY CERVICAL SEGMENT:  Mild stenosis at the origin   The vessel is normal in caliber throughout the neck  RIGHT EXTRACRANIAL CAROTID SEGMENT:  Mild atherosclerotic disease of the distal common carotid artery and proximal cervical internal carotid artery without significant stenosis compared to the more distal ICA  LEFT EXTRACRANIAL CAROTID SEGMENT:  Mild atherosclerotic disease of the distal common carotid artery and proximal cervical internal carotid artery without significant stenosis compared to the more distal ICA  NASCET criteria was used to determine the degree of internal carotid artery diameter stenosis  INTRACRANIAL VASCULATURE INTERNAL CAROTID ARTERIES:  Normal enhancement of the intracranial portions of the internal carotid arteries  Normal ophthalmic artery origins  Normal ICA terminus  ANTERIOR CIRCULATION:  Focal stenosis left A1 segment  Anterior cerebral arteries are otherwise unremarkable  Anterior communicating artery  MIDDLE CEREBRAL ARTERY CIRCULATION:  M1 segment and middle cerebral artery branches demonstrate normal enhancement bilaterally  DISTAL VERTEBRAL ARTERIES:  Normal distal vertebral arteries  Left vertebral artery is relatively hypoplastic distal to PICA  Posterior inferior cerebellar artery origins are normal  Normal vertebral basilar junction  BASILAR ARTERY:  Basilar artery is normal in caliber  Normal superior cerebellar arteries  POSTERIOR CEREBRAL ARTERIES: The right posterior cerebral artery arises from the basilar tip  There is fetal origin of the left posterior cerebral artery  Both demonstrate no focal stenosis  Normal posterior communicating arteries  DURAL VENOUS SINUSES:  Normal  NON VASCULAR ANATOMY BONY STRUCTURES:  No acute osseous abnormality  Spinal degenerative changes most pronounced at C4-5  SOFT TISSUES OF THE NECK:  Heterogeneous multinodular thyroid gland with largest discrete nodule measuring 1 4 cm   According to guidelines published in the February 2015 white paper on incidental thyroid nodules in the Journal of the [employer] of Radiology [facility]), because the nodule(s) are less than 1 5 cm in size and without suspicious feature, no further evaluation is recommended THORACIC INLET:  Unremarkable  Impression: No significant stenosis of the cervical carotid or vertebral arteries  Focal stenosis left A1 segment  No other intracranial stenosis, vessel occlusion or aneurysm  Findings were directly discussed with Lulu Santos on 7/27/2021 12:27 PM  Workstation performed: [id number]     Urinalysis:       Invalid input(s): URIBILINOGEN     Urine Micro:        EKG, Pathology, and Other Studies: I have personally reviewed pertinent reports  Medications given in Emergency Department     Medication Administration - last 24 hours from 07/26/2021 1452 to 07/27/2021 1452       Date/Time Order Dose Route Action Action by     07/27/2021 1212 iohexol (OMNIPAQUE) 350 MG/ML injection (MULTI-DOSE) 85 mL 85 mL Intravenous Given Herminia Graf     07/27/2021 1352 aspirin tablet 325 mg 325 mg Oral Given Felipa Moran RN     07/27/2021 1354 aspirin chewable tablet 81 mg 81 mg Oral Not Given Felipa Moran RN          Assessment and Plan     Medical Problems     Problem List     Dizziness    Chest tightness    HTN (hypertension)    GERD (gastroesophageal reflux disease)    Chronic idiopathic constipation    Perianal lesion                Code Status: Level 3 - DNAR/DNI  VTE Pharmacologic Prophylaxis: Enoxaparin (Lovenox)   VTE Mechanical Prophylaxis: sequential compression device  Admission Status: OBSERVATION    Admission Time  I spent 20 minutes admitting the patient  This involved direct patient contact where I performed a full history and physical, reviewing previous records, and reviewing laboratory and other diagnostic studies      Clementina Green MS4   Internal Medicine

## 2021-07-28 ENCOUNTER — APPOINTMENT (OUTPATIENT)
Dept: NON INVASIVE DIAGNOSTICS | Facility: HOSPITAL | Age: 86
End: 2021-07-28
Payer: MEDICARE

## 2021-07-28 DIAGNOSIS — F41.0 ANXIETY ATTACK: ICD-10-CM

## 2021-07-28 PROBLEM — G31.84 MILD COGNITIVE IMPAIRMENT: Status: ACTIVE | Noted: 2021-07-28

## 2021-07-28 LAB
ANION GAP SERPL CALCULATED.3IONS-SCNC: 7 MMOL/L (ref 4–13)
BUN SERPL-MCNC: 12 MG/DL (ref 5–25)
CALCIUM SERPL-MCNC: 9.4 MG/DL (ref 8.3–10.1)
CHLORIDE SERPL-SCNC: 108 MMOL/L (ref 100–108)
CHOLEST SERPL-MCNC: 184 MG/DL (ref 50–200)
CO2 SERPL-SCNC: 25 MMOL/L (ref 21–32)
CREAT SERPL-MCNC: 0.58 MG/DL (ref 0.6–1.3)
ERYTHROCYTE [DISTWIDTH] IN BLOOD BY AUTOMATED COUNT: 13 % (ref 11.6–15.1)
FOLATE SERPL-MCNC: 6.5 NG/ML (ref 3.1–17.5)
GFR SERPL CREATININE-BSD FRML MDRD: 84 ML/MIN/1.73SQ M
GLUCOSE P FAST SERPL-MCNC: 86 MG/DL (ref 65–99)
GLUCOSE SERPL-MCNC: 86 MG/DL (ref 65–140)
HCT VFR BLD AUTO: 37 % (ref 34.8–46.1)
HDLC SERPL-MCNC: 68 MG/DL
HGB BLD-MCNC: 12.6 G/DL (ref 11.5–15.4)
LDLC SERPL CALC-MCNC: 99 MG/DL (ref 0–100)
MCH RBC QN AUTO: 32.3 PG (ref 26.8–34.3)
MCHC RBC AUTO-ENTMCNC: 34.1 G/DL (ref 31.4–37.4)
MCV RBC AUTO: 95 FL (ref 82–98)
PLATELET # BLD AUTO: 208 THOUSANDS/UL (ref 149–390)
PMV BLD AUTO: 11.3 FL (ref 8.9–12.7)
POTASSIUM SERPL-SCNC: 3.3 MMOL/L (ref 3.5–5.3)
RBC # BLD AUTO: 3.9 MILLION/UL (ref 3.81–5.12)
SODIUM SERPL-SCNC: 140 MMOL/L (ref 136–145)
TRIGL SERPL-MCNC: 87 MG/DL
VIT B12 SERPL-MCNC: 321 PG/ML (ref 100–900)
WBC # BLD AUTO: 6.98 THOUSAND/UL (ref 4.31–10.16)

## 2021-07-28 PROCEDURE — 80061 LIPID PANEL: CPT | Performed by: STUDENT IN AN ORGANIZED HEALTH CARE EDUCATION/TRAINING PROGRAM

## 2021-07-28 PROCEDURE — 97163 PT EVAL HIGH COMPLEX 45 MIN: CPT

## 2021-07-28 PROCEDURE — 80048 BASIC METABOLIC PNL TOTAL CA: CPT | Performed by: STUDENT IN AN ORGANIZED HEALTH CARE EDUCATION/TRAINING PROGRAM

## 2021-07-28 PROCEDURE — 82746 ASSAY OF FOLIC ACID SERUM: CPT | Performed by: PHYSICIAN ASSISTANT

## 2021-07-28 PROCEDURE — 97166 OT EVAL MOD COMPLEX 45 MIN: CPT

## 2021-07-28 PROCEDURE — 93306 TTE W/DOPPLER COMPLETE: CPT | Performed by: INTERNAL MEDICINE

## 2021-07-28 PROCEDURE — 82607 VITAMIN B-12: CPT | Performed by: PHYSICIAN ASSISTANT

## 2021-07-28 PROCEDURE — 92523 SPEECH SOUND LANG COMPREHEN: CPT

## 2021-07-28 PROCEDURE — 93306 TTE W/DOPPLER COMPLETE: CPT

## 2021-07-28 PROCEDURE — 99214 OFFICE O/P EST MOD 30 MIN: CPT | Performed by: PHYSICIAN ASSISTANT

## 2021-07-28 PROCEDURE — 85027 COMPLETE CBC AUTOMATED: CPT | Performed by: STUDENT IN AN ORGANIZED HEALTH CARE EDUCATION/TRAINING PROGRAM

## 2021-07-28 RX ORDER — POTASSIUM CHLORIDE 20 MEQ/1
40 TABLET, EXTENDED RELEASE ORAL 2 TIMES DAILY
Status: COMPLETED | OUTPATIENT
Start: 2021-07-28 | End: 2021-07-28

## 2021-07-28 RX ORDER — AMLODIPINE BESYLATE 5 MG/1
5 TABLET ORAL DAILY
Status: DISCONTINUED | OUTPATIENT
Start: 2021-07-28 | End: 2021-07-29 | Stop reason: HOSPADM

## 2021-07-28 RX ORDER — LORAZEPAM 0.5 MG/1
0.5 TABLET ORAL 2 TIMES DAILY
Qty: 60 TABLET | Refills: 0 | Status: SHIPPED | OUTPATIENT
Start: 2021-07-28 | End: 2021-07-29 | Stop reason: HOSPADM

## 2021-07-28 RX ADMIN — POTASSIUM CHLORIDE 40 MEQ: 1500 TABLET, EXTENDED RELEASE ORAL at 08:31

## 2021-07-28 RX ADMIN — METOPROLOL TARTRATE 25 MG: 25 TABLET, FILM COATED ORAL at 12:46

## 2021-07-28 RX ADMIN — PANTOPRAZOLE SODIUM 40 MG: 40 TABLET, DELAYED RELEASE ORAL at 05:02

## 2021-07-28 RX ADMIN — POTASSIUM CHLORIDE 40 MEQ: 1500 TABLET, EXTENDED RELEASE ORAL at 19:02

## 2021-07-28 RX ADMIN — ENOXAPARIN SODIUM 40 MG: 40 INJECTION SUBCUTANEOUS at 08:31

## 2021-07-28 RX ADMIN — AMLODIPINE BESYLATE 5 MG: 5 TABLET ORAL at 12:46

## 2021-07-28 RX ADMIN — ASPIRIN 81 MG CHEWABLE TABLET 81 MG: 81 TABLET CHEWABLE at 08:31

## 2021-07-28 RX ADMIN — METOPROLOL TARTRATE 25 MG: 25 TABLET, FILM COATED ORAL at 20:52

## 2021-07-28 RX ADMIN — ATORVASTATIN CALCIUM 40 MG: 40 TABLET, FILM COATED ORAL at 19:02

## 2021-07-28 RX ADMIN — CYANOCOBALAMIN TAB 500 MCG 1000 MCG: 500 TAB at 12:46

## 2021-07-28 NOTE — RESTORATIVE TECHNICIAN NOTE
Restorative Technician Note      Patient Name: Evangelist Vasquez     Note Type: Mobility (Educated/encouraged pt to ambulate with assistance 3-4 x's/day, pt refused 2* c/o hip pain nursing aware )    Danish ESPINOZA, Restorative Technician, [employer]

## 2021-07-28 NOTE — OCCUPATIONAL THERAPY NOTE
Occupational Therapy Evaluation     Patient Name: Meryle Hipps  HVXNF'Z Date: 7/28/2021  Problem List  Principal Problem:    Word finding difficulty  Active Problems:    HTN (hypertension)    Mild cognitive impairment    Past Medical History  Past Medical History:   Diagnosis Date    Allergic rhinitis     Anxiety     Diverticulitis of large intestine without perforation or abscess without bleeding     Dysthymic disorder     Gastro-esophageal reflux disease without esophagitis     HLD (hyperlipidemia)     Hypertension     Osteopenia     Palpitations     Paresthesia      Past Surgical History  Past Surgical History:   Procedure Laterality Date    APPENDECTOMY      CHOLECYSTECTOMY      COLON SURGERY      FL INJECTION RIGHT HIP (NON ARTHROGRAM)  8/28/2019    HYSTERECTOMY          07/28/21 0908   OT Last Visit   OT Visit Date 07/28/21   Note Type   Note type Evaluation   Restrictions/Precautions   Weight Bearing Precautions Per Order No   Other Precautions Pain;Telemetry   Pain Assessment   Pain Assessment Tool 0-10   Pain Score No Pain   Home Living   Type of [address] [place] [address] Two level;Stairs to enter with rails  (1-2 JULIO)   Bathroom Equipment Grab bars around toilet;Grab bars in shower;Commode; Shower chair  (Pt states she does not use)   Home Equipment Walker;Stair Vestal to 2nd floor bed/bath   Prior Function   Level of [place] Independent with ADLs and functional mobility   Lives With Family  (Daughter, RADHA, 2 adult grandsons)   Receives Help From Family  (Daughter (home always) and 2 adult grandsons)   ADL Assistance Independent   IADLs Needs assistance   Falls in the last 6 months 0   Vocational Retired   Lifestyle   Autonomy PTA, pt was independent with ADLs and functional mobility +RW  Pt receives assistance with IADLs      Reciprocal Relationships Supportive daughter and daughter's family   Service to Others Retired RN   Intrinsic Gratification Spending time with family   Psychosocial Psychosocial (WDL) WDL   ADL   Eating Assistance 5  Supervision/Setup   Grooming Assistance 5  Supervision/Setup   UB Bathing Assistance 5  Supervision/Setup   LB Bathing Assistance 5  Supervision/Setup   UB Dressing Assistance 5  Supervision/Setup   LB Dressing Assistance 5  Supervision/Setup   Toileting Assistance  5  Supervision/Setup   Bed Mobility   Supine to Sit Unable to assess   Sit to Supine Unable to assess   Additional Comments Pt received OOB in recliner chair, left in same position with all needs within reach  Transfers   Sit to Stand 5  Supervision   Additional items Increased time required   Stand to Sit 5  Supervision   Additional items Increased time required   Toilet transfer 5  Supervision   Additional items Increased time required  (+grab bar)   Additional Comments Pt transferred with supervision +RW   Functional Mobility   Functional Mobility 5  Supervision   Additional Comments Supervision +RW short distance in room from chair to toilet and back to chair, presenting with with antalgic gait (stated she has a "bad hip" but does not know if she is a candidate for hip replacement surgery)  Pt was asked to perform toilet transfer and initially walked into bathroom and then backed out of room; apparently not understanding instructions  Pt then performed toilet transfer with further encouragement     Additional items Rolling walker   Balance   Static Sitting Normal   Dynamic Sitting Good   Static Standing Good   Dynamic Standing Fair   Ambulatory Fair   Activity Tolerance   Activity Tolerance Patient tolerated treatment well   Medical Staff Made Aware PT [place]   Nurse Made Aware RN cleared pt for therapy   RUE Assessment   RUE Assessment WFL   LUE Assessment   LUE Assessment WFL   Hand Function   Gross Motor Coordination Functional   Fine Motor Coordination Functional   Sensation   Light Touch No apparent deficits   Proprioception   Proprioception No apparent deficits   Vision-Basic Assessment Current Vision No visual deficits   Perception   Inattention/Neglect Appears intact   Cognition   Arousal/Participation Alert; Responsive   Attention Attends with cues to redirect   Orientation Level Oriented X4   Memory Within functional limits   Following Commands Follows multistep commands with increased time or repetition   Comments Pt was pleasant and cooperative with therapy with increased encouragement  Pt declined walking in herr stating, "I'm not wearing anything under this gown " Pt declined offer to wear underwear and pants  Some decreased insight and decreased engagement noted during session; pt required coaxing to participate in sessions and appeared visibly annoyed by requests to perform transfers  Pt demonstrated one instance of word finding difficulty but reported that she was much better than she was at admission  Per neurology note, pt may be developing some cognitive impairment (potential dementia?) but daughter is home 24/7 with patient  Assessment   Limitation Decreased self-care trans;Decreased high-level ADLs; Decreased endurance   Prognosis Good   Assessment Pt is a 81 y o  female who was [facility] [place] [facility] with Word finding difficulty and HTN  Pt  has a past medical history of Allergic rhinitis, Anxiety, Diverticulitis of large intestine without perforation or abscess without bleeding, Dysthymic disorder, Gastro-esophageal reflux disease without esophagitis, HLD (hyperlipidemia), Hypertension, Osteopenia, Palpitations, and Paresthesia  Pt has active OT orders  OT was consulted to assess pt's functional status and occupational performance in order to determine discharge needs  Pt lives with her daughter, son-in-law, and two adult sons in a 2  with 1-2 JULIO  PTA, pt was independent with ADLs, IADLs, & functional mobility, and used a RW at baseline  Pt does not drive at baseline   At time of evaluation, pt required supervision for ADLS, transfers, and functional mobility and demonstrated the following occupational impairments: functional mobility/transfers, community mobility, social participation  and leisure activities   These deficits result from the following performance deficits and barriers: decreased initiation and engagement , decreased endurance, activity tolerance and standing balance/tolerance, risk for falls  From an OT standpoint, recommend discharge to home with continued family support when medically stable  The patient's raw score on the AM-PAC Daily Activity inpatient short form is 21, standardized score is 44 27, greater than 39 4  Patients at this level are likely to benefit from discharge to home  Please refer to the recommendation of the Occupational Therapist for safe discharge planning  At this time, no further acute occupational therapy needs; please re-consult if needed      Recommendation   OT Discharge Recommendation No rehabilitation needs   OT - OK to Discharge Yes   AM-PAC Daily Activity Inpatient   Lower Body Dressing 3   Bathing 3   Toileting 3   Upper Body Dressing 4   Grooming 4   Eating 4   Daily Activity Raw Score 21   Daily Activity Standardized Score (Calc for Raw Score >=11) 44 27   AM-PAC Applied Cognition Inpatient   Following a Speech/Presentation 3   Understanding Ordinary Conversation 4   Taking Medications 4   Remembering Where Things Are Placed or Put Away 3   Remembering List of 4-5 Errands 3   Taking Care of Complicated Tasks 3   Applied Cognition Raw Score 20   Applied Cognition Standardized Score 41 76

## 2021-07-28 NOTE — SPEECH THERAPY NOTE
Speech/Language Evaluation      Patient Name: Christian Martínez    GNDNR'N Date: roblem List  Principal Problem:    Word finding difficulty  Active Problems:    HTN (hypertension)      Past Medical History  Past Medical History:   Diagnosis Date    Allergic rhinitis     Anxiety     Diverticulitis of large intestine without perforation or abscess without bleeding     Dysthymic disorder     Gastro-esophageal reflux disease without esophagitis     HLD (hyperlipidemia)     Hypertension     Osteopenia     Palpitations     Paresthesia        Past Surgical History  Past Surgical History:   Procedure Laterality Date    APPENDECTOMY      CHOLECYSTECTOMY      COLON SURGERY      FL INJECTION RIGHT HIP (NON ARTHROGRAM)  8/28/2019    HYSTERECTOMY           Summary   The patient presents with adequate speech, language and cognitive skills  She has occasional word finding difficulty during conversation and is educated on strategies to help  Encouraged to f/u with OP ST if concerns continue upon discharge  Recommendation:  F/U with OP ST if needed     Current Medical:       From H&P:   Christian Martínez is a 81 y o  female with past medical history of for HTN, HLD, GERD and chronic constipation who presented with SLB with word finding difficulties  Patient states that she was sitting and watching the Olympics when her daughter noticed that she was having a difficult time verbalizing her words  The daughter also noted some left sided arm weakness however the patient denies this symptom  She continued to have aphasia until she got to the hospital  She says she is getting better but still not at baseline  She said in the last few weeks she has had a couple of episodes of "not feeling like herself" but has never had similar symptoms  She says she is compliant with her medications and took her metoprolol but not the amlopdipine   Her ROS is positive for a slight headache but negative for chest pain, SOB, changes in vision, constipation or diarrhea, or weakness  Patient denies any recent falls       General Cognitive Status:  Alert with adequate attention    Auditory Comprehension:  Body part ID: wfl  Left vs Right Body part: wfl  One step commands: wfl  Two step commands: wfl  Personal y/n ?'s: wfl  Simple y/n ?'s: wfl  Complex y/n ?'s: n/a    Reading Comprehension:  Functional reading appears adequate     Speech Mechanism Examination:   Facial: symmetrical  Labial: [facility]  Lingual: [facility]  Velum: unable to visualize  Mandible: adequate ROM  Dentition: adequate  Vocal quality:clear/adequate     Oral Expression:  Auto Sequences:   Count Forwards: [facility]  Automatic Social Utterances[de-identified] [facility]  Word Repetition: [facility]  Phrase Completion: [facility]  Confrontation Naming: [facility]  Responsive Naming: [facility]  Divergent Naming: [facility]  Picture Description: n/a  Conversation: [facility]  Able to make basic needs known: yes    Written Expression:  Not assessed     Motor Speech:  No dysarthria or apraxia noted     Orientation:  Person: [facility]  Place: [facility], specifically [address] [place] [address] Ave: n/a  Time of Day: wfl  Month: wfl  CANDY: n/a  Year:  [facility]  Reason for hospitalization: [facility]    Cognitive -linguistic skills:  Grossly intact    Results discussed with:  Patient

## 2021-07-28 NOTE — PROGRESS NOTES
Progress Note - Neurology   Jeremias Civil 81 y o  female MRN: [id number]  Unit/Bed#: [facility] 702-01 Encounter: [id number]      Assessment/Plan     * Word finding difficulty  Assessment & Plan  50-year-old female with uncontrolled hypertension, dyslipidemia, history of diverticulitis, anxiety, GERD, probable cognitive impairment, presents with word-finding difficulties, confusion, and left upper extremity weakness  Patient with word-finding difficulty and reported confusion prior to presentation, as well as left upper extremity weakness as reported by EMS  On examination in the ED, patient had mild speech difficulty with a single paraphasic error, could not state the current month or year, but had no weakness or sensory abnormality  Patient's baseline unclear as reports from patient's daughters varied  It seems she may have some difficulty with word finding at times, and may be developing some degree of cognitive impairment/forgetfulness, prompting her to move in with her daughter  Etiology of symptoms hypertensive encephalopathy versus TIA  Blood pressure on arrival was 204/99, and as high as 048 systolic in the ED  MRI brain demonstrated no acute changes  Did demonstrate scattered chronic small vessel cerebrovascular ischemic disease  CT head demonstrated no acute changes  CTA head/neck demonstrated no flow consequential stenosis or LVO  A1c 5 1  LDL 99  Patient was not on any antiplatelet or anticoagulant at baseline  Plan:  -2D echocardiogram with bubble study pending  If unremarkable, no further inpatient neurologic recommendations   -loaded with aspirin 325 mg in the ED  Continue 81 mg aspirin daily     -continue atorvastatin 40 mg   -Telemetry  -PT/OT/speech therapy   -goal normotension   -Frequent neuro checks   -Continue to monitor and notify with changes  -outpatient follow-up with Neurology has been requested        Mild cognitive impairment  Assessment & Plan  Patient admits she has trouble recalling the year  At times with memory retrieval difficulties as well  Per discussion with her daughters, patient recently moved in with her youngest daughter as she left a pot of boiling water on and forgot about it, food items had been placed in strange locations, etc   -B12 321  Prefer level to be greater than 400  Will start B12 1000 mg p o  daily   -Folate 6 5   -RPR pending  -TSH 0 064 with free T4 1 03   -patient does not drive   -recommend full neurocognitive evaluation as outpatient  HTN (hypertension)  Assessment & Plan  Systolic blood pressure as high as 220 on presentation  Gradual reduction as noted above  Major Arce will need follow up in in 4 weeks with neurovascular attending or advance practitioner  She will not require outpatient neurological testing  Subjective:   Patient states she feels much better today  She slept well after receiving Ativan 0 5 mg HS  Feels her speech is much more fluent this morning  She states she recently moved in with her daughter, which has been a big transition, and she feels this may have contributed to her presenting symptoms  Denies lateralized weakness, numbness, significant speech difficulty, although states she has difficulty remembering the current year, which is not new  ROS: 12 point review of systems performed and negative except as indicated above  Vitals: Blood pressure 161/90, pulse 89, temperature 98 5 °F (36 9 °C), resp  rate 18, weight 53 4 kg (117 lb 11 6 oz), SpO2 96 %  ,Body mass index is 20 85 kg/m²  Physical Exam:   General:  Patient is well-developed, well-nourished, and in no acute distress  HEENT:  Head normocephalic  Eyes anicteric  Cardiovascular:  With regular rhythm  Lungs:  Normal effort  Nonlabored breathing  Extremities:  With no significant edema  Skin: No rashes    Neurologic: Mental Status:  Patient is alert, pleasantly interactive, and appropriately conversational   No obvious symbolic language difficulty or dysarthria, and the patient is oriented to person, location, month, and date, but stated the current year is 2080  Correctly identified the current president  Gait deferred for safety  Cranial Nerves:   II: Visual fields full to confrontation  Pupils equal, round, reactive to light with normal accomodation  Cannot visualize optic fundi  III,IV,VI: extraocular movements intact with no nystagmus  V: Sensation in the V1 through V3 distributions intact to light touch bilaterally  VII: Face is symmetric with no weakness noted  VIII: Audition intact to finger rub bilaterally  IX/X: Uvula midline  Soft palate elevation symmetric  XII: Tongue midline with no atrophy or fasciculations with appropriate movement  Coordination:  Accurate with finger-to-nose maneuvers bilaterally  Motor testing with no upper or lower extremity drift  Full strength noted throughout, except for right hip flexion, 4/5  Sensory testing grossly intact to light touch throughout  Diffusely hyporeflexic throughout      Lab, Imaging and other studies:   CBC:   Results from last 7 days   Lab Units 07/28/21  0500 07/27/21  1159   WBC Thousand/uL 6 98 6 38   RBC Million/uL 3 90 4 31   HEMOGLOBIN g/dL 12 6 14 2   HEMATOCRIT % 37 0 41 1   MCV fL 95 95   PLATELETS Thousands/uL 208 231   , BMP/CMP:   Results from last 7 days   Lab Units 07/28/21  0500 07/27/21  1159   SODIUM mmol/L 140 138   POTASSIUM mmol/L 3 3* 4 8   CHLORIDE mmol/L 108 105   CO2 mmol/L 25 25   BUN mg/dL 12 10   CREATININE mg/dL 0 58* 0 71   CALCIUM mg/dL 9 4 9 6   EGFR ml/min/1 73sq m 84 78   , HgBA1C:   Results from last 7 days   Lab Units 07/27/21  1159   HEMOGLOBIN A1C % 5 1   , TSH:   Results from last 7 days   Lab Units 07/27/21  1159   TSH 3RD GENERATON uIU/mL 0 064*   , Lipid Profile:   Results from last 7 days   Lab Units 07/28/21  0500   HDL mg/dL 68   LDL CALC mg/dL 99   TRIGLYCERIDES mg/dL 87     VTE Prophylaxis: Sequential compression device (Venodyne)     Counseling / Coordination of Care  Total time spent today 25 minutes  Greater than 50% of total time was spent with the patient and / or family counseling and / or coordination of care  A description of the counseling / coordination of care: Discussed results of MRI brain with patient and plan to continue her on aspirin and statin and follow up with Neurology as outpatient

## 2021-07-28 NOTE — RESTORATIVE TECHNICIAN NOTE
Restorative Technician Note      Patient Name: Brain Bonds     Note Type: Mobility (Educated/encouraged pt to ambulate with assistance 3-4 x's/day, pt refused 2* c/o hip pain RN Rosio Julian aware )    Corey ESPINOZA, Restorative Technician, [employer] [facility]

## 2021-07-28 NOTE — PROGRESS NOTES
INTERNAL MEDICINE RESIDENCY PROGRESS NOTE     Name: Daisha Jennings   Age & Sex: 81 y o  female   MRN: [id number]  Unit/Bed#: [facility] 858-42   Encounter: [id number]  Team: SOD Team B     PATIENT INFORMATION     Name: Daisha Jennings   Age & Sex: 81 y o  female   MRN: [id number]  Hospital Stay Days: 0    ASSESSMENT/PLAN     Principal Problem:    Word finding difficulty  Active Problems:    HTN (hypertension)    Mild cognitive impairment      * Word finding difficulty  Assessment & Plan  Patient noticed some word finding difficulties while watching the Olympics with her daughter  Patient was hypertensive in the 200s in the ED  She took her metoprolol this am but not the amlodipine  CT was unremarkable  ABCD2 was 4 which is moderate risk  MRI was unremarkable  Plan  - Neuro consult, would appreciate recs   - Echo pending, if normal, can D/C  - Continue 81 mg aspirin daily     - Started Lipitor 40 mg   - Monitor on tele for 24 hours   - Frequent Neuro checks         Mild cognitive impairment  Assessment & Plan  Patient has memory retrieval difficulties  Patient recently moved in with her youngest daughter as she left a pot of boiling water on and forgot about it  B12 level 321, Folate 6 5    Plan  - Per neuro started B12 1000 mg PO daily to get B12 above 400   - RPR ordered  - recommend full neurocognitive evaluation as outpatient    HTN (hypertension)  Assessment & Plan  Home medications are metoprolol and amlodipine  Patient took metoprolol this morning but forgot to take her amlodipine  Plan:  - Restarted home Metoprolol 25 BID and amlodipine 5 mg          Disposition: Inpatient     SUBJECTIVE     Patient seen and examined  No acute events overnight  Patient's word finding difficulties are improved from admission  No current complaints       OBJECTIVE     Vitals:    07/28/21 0156 07/28/21 0735 07/28/21 0800 07/28/21 1107   BP: 134/79 127/80  161/90   Pulse: 87 96  89   Resp:  18  18   Temp:  98 5 °F (36 9 °C) 98 5 °F (36 9 °C)   TempSrc:       SpO2: 96% 95% 96% 96%   Weight:          Temperature:   Temp (24hrs), Av 3 °F (36 8 °C), Min:97 9 °F (36 6 °C), Max:98 5 °F (36 9 °C)    Temperature: 98 5 °F (36 9 °C)  Intake & Output:  I/O       701 -  07 -  07 07           Unmeasured Urine Occurrence  1 x         Weights:        Body mass index is 20 85 kg/m²  Weight (last 2 days)     Date/Time   Weight    21 1200   53 4 (117 73)            Physical Exam  Constitutional:       Appearance: Normal appearance  HENT:      Head: Normocephalic and atraumatic  Nose: Nose normal    Eyes:      Conjunctiva/sclera: Conjunctivae normal       Pupils: Pupils are equal, round, and reactive to light  Cardiovascular:      Rate and Rhythm: Normal rate and regular rhythm  Pulses: Normal pulses  Heart sounds: Normal heart sounds  Pulmonary:      Effort: Pulmonary effort is normal       Breath sounds: Normal breath sounds  Abdominal:      General: Bowel sounds are normal       Palpations: Abdomen is soft  Musculoskeletal:      Comments: Chronic right hip and knee pain   Neurological:      Mental Status: She is alert and oriented to person, place, and time  Psychiatric:         Mood and Affect: Mood normal          Behavior: Behavior normal          Thought Content: Thought content normal          Judgment: Judgment normal        LABORATORY DATA     Labs: I have personally reviewed pertinent reports    Results from last 7 days   Lab Units 21  0500 21  1159   WBC Thousand/uL 6 98 6 38   HEMOGLOBIN g/dL 12 6 14 2   HEMATOCRIT % 37 0 41 1   PLATELETS Thousands/uL 208 231      Results from last 7 days   Lab Units 21  0500 21  1159   POTASSIUM mmol/L 3 3* 4 8   CHLORIDE mmol/L 108 105   CO2 mmol/L 25 25   BUN mg/dL 12 10   CREATININE mg/dL 0 58* 0 71   CALCIUM mg/dL 9 4 9 6              Results from last 7 days   Lab Units 21  1159   INR 1 00   PTT seconds 28         Results from last 7 days   Lab Units 07/27/21  1159   TROPONIN I ng/mL <0 02       IMAGING & DIAGNOSTIC TESTING     Radiology Results: I have personally reviewed pertinent reports  CT stroke alert brain    Result Date: 7/27/2021  Impression: No acute intracranial abnormality  Microangiopathic changes  Findings were directly discussed with Myriam Carson on 7/27/2021 12:27 PM  Workstation performed: [id number]     CTA stroke alert (head/neck)    Result Date: 7/27/2021  Impression: No significant stenosis of the cervical carotid or vertebral arteries  Focal stenosis left A1 segment  No other intracranial stenosis, vessel occlusion or aneurysm  Findings were directly discussed with Myriam Carson on 7/27/2021 12:27 PM  Workstation performed: [id number]     Other Diagnostic Testing: I have personally reviewed pertinent reports      ACTIVE MEDICATIONS     Current Facility-Administered Medications   Medication Dose Route Frequency    amLODIPine (NORVASC) tablet 5 mg  5 mg Oral Daily    aspirin chewable tablet 81 mg  81 mg Oral Daily    atorvastatin (LIPITOR) tablet 40 mg  40 mg Oral QPM    cyanocobalamin (VITAMIN B-12) tablet 1,000 mcg  1,000 mcg Oral Daily    enoxaparin (LOVENOX) subcutaneous injection 40 mg  40 mg Subcutaneous Q24H ZAIRA    hydrALAZINE (APRESOLINE) injection 5 mg  5 mg Intravenous Q6H PRN    metoprolol tartrate (LOPRESSOR) tablet 25 mg  25 mg Oral Q12H ZAIRA    pantoprazole (PROTONIX) EC tablet 40 mg  40 mg Oral Early Morning    potassium chloride (K-DUR,KLOR-CON) CR tablet 40 mEq  40 mEq Oral BID       VTE Pharmacologic Prophylaxis: Enoxaparin (Lovenox)  VTE Mechanical Prophylaxis: sequential compression device      ==  500 [employer]

## 2021-07-28 NOTE — PHYSICAL THERAPY NOTE
Physical Therapy Evaluation     Patient's Name: Ramesh Torres    Admitting Diagnosis  TIA (transient ischemic attack) [G45 9]  HTN (hypertension) [facility] ([facility]) Stroke Scale level of consciousness score 0, alert; keenly responsive [Z78 9]    Problem List  Patient Active Problem List   Diagnosis    Dizziness    Chest tightness    HTN (hypertension)    GERD (gastroesophageal reflux disease)    Chronic idiopathic constipation    Perianal lesion    Word finding difficulty    Mild cognitive impairment       Past Medical History  Past Medical History:   Diagnosis Date    Allergic rhinitis     Anxiety     Diverticulitis of large intestine without perforation or abscess without bleeding     Dysthymic disorder     Gastro-esophageal reflux disease without esophagitis     HLD (hyperlipidemia)     Hypertension     Osteopenia     Palpitations     Paresthesia        Past Surgical History  Past Surgical History:   Procedure Laterality Date    APPENDECTOMY      CHOLECYSTECTOMY      COLON SURGERY      FL INJECTION RIGHT HIP (NON ARTHROGRAM)  8/28/2019    HYSTERECTOMY          07/28/21 1145   PT Last Visit   PT Visit Date 07/28/21   Note Type   Note type Evaluation   Pain Assessment   Pain Assessment Tool 0-10  (0 AT REST; 5/10 R HIP WITH AMBULATION/SITTING DOWN)   Pain Location/Orientation Orientation: Right;Location: Hip   Effect of Pain on Daily Activities R HIP PAIN LIMITS AMBULATORY DISTANCE    Patient's Stated Pain Goal No pain   Hospital Pain Intervention(s) Ambulation/increased activity   Home Living   Type of [address] [place] [address] Two level;Stairs to enter with rails  (1 JULIO)   Bathroom Equipment Grab bars in shower;Commode; Shower chair   [address] Walker   Additional Comments PRIOR TO THIS ADMISSION PATIENT RESIDED WITH HER DGHT (HOME WITH PATIENT ALL OF THE TIME), SON IN LAW, AND 2 ADULT GRANDSONS IN A MULTILEVEL HOME (1 JULIO AND STAIR GLIDE TO 2ND FLOOR)  AT HER BASELINE PATIENT IS I WITH MOBILITY (ROLLATOR, DENIES FALLS), ADLS, AND IADLS  Prior Function   Level of [place] Independent with ADLs and functional mobility   Lives With Family   Receives Help From Family   ADL Assistance Independent   IADLs Needs assistance   Falls in the last 6 months 0   Vocational Retired   Restrictions/Precautions   [facility] Bearing Precautions Per Order No   Braces or Orthoses   (NONE)   Other Precautions Pain;Telemetry   General   Family/Caregiver Present No   Cognition   Overall Cognitive Status WFL   Arousal/Participation Alert   Orientation Level Oriented X4   Memory Within functional limits   Following Commands Follows multistep commands with increased time or repetition   Comments REQUIRED ENCOURAGEMENT TO AMBULATE- SKEPTICAL OF PT SERVICES  EXPLAINED PURPOSE BEHIND EVALUATION AND D/C PLANNING AND PATIENT AGREEABLE  DID HAVE ONE INSTANCE OF WORD FINDING DIFFICULTY  PATIENT REPORTS SHE THOUGH HER WORD FINDING DIFFICULTY WAS BETTER UNLESS SUCH WAS NERVOUS TALKING TO A DOCTOR    RUE Assessment   RUE Assessment WFL   LUE Assessment   LUE Assessment WFL   RLE Assessment   RLE Assessment WFL  (3+/5 R HIP (PAIN); 4-/5 GROSSLY )   LLE Assessment   LLE Assessment WFL  (4/5 GROSSLY )   Bed Mobility   Supine to Sit Unable to assess   Sit to Supine Unable to assess   Additional Comments IN CHAIR PRE AND POST EVAL    Transfers   Sit to Stand 5  Supervision   Additional items Increased time required;Verbal cues  (VC-- PUSH FROM CHAIR SIT-->STAND)   Stand to Sit 5  Supervision   Additional items Increased time required;Verbal cues   Ambulation/Elevation   Gait pattern Excessively slow; Step to;Decreased R stance; Improper Weight shift; Antalgic   Gait Assistance 5  Supervision   Assistive Device Rolling walker   Distance 10 FEET X 2 (DECLINED FURTHER SECONDARY TO HIP PAIN)    Balance   Static Sitting Normal   Static Standing Good   Ambulatory Fair  (W/ RW) Endurance Deficit   Endurance Deficit Yes   Endurance Deficit Description CHRONIC  R HIP PAIN    Activity Tolerance   Activity Tolerance Patient tolerated treatment well;Patient limited by pain  (CHRONIC PAIN )   Medical Staff Made Aware CM [address] ANDIE TO SEE PER NYLA LYLE   Assessment   Prognosis Good   Problem List Decreased strength;Decreased endurance;Decreased mobility; Impaired balance;Pain   Assessment PT COMPLETED EVALUATION OF 81YEAR OLD FEMALE ADMITTED TO [facility] ON 7/27/2021 WITH WORD FINDING DIFFICULTY  PATIENT PLACED ON STROKE PATHWAY  MRI BRAIN (-) FOR ACUTE ABNORMALITY  CURRENT STATUS INSTABILITIES INCLUDE PAIN (CHRONIC RIGHT HIP), PENDING ECHO, NEURO CHECKS, AND TELEMETRY MONITORING  PMH IS SIGNIFICANT FOR RIGHT HIP INJECTION (2019), OSTEOPENIA, ANXIETY, AND HTN  PRIOR TO THIS ADMISSION PATIENT RESIDED WITH HER DGHT (HOME WITH PATIENT ALL OF THE TIME), SON IN LAW, AND 2 ADULT GRANDSONS IN A MULTILEVEL HOME (1 JULIO AND STAIR GLIDE TO 2ND FLOOR)  AT HER BASELINE PATIENT IS I WITH MOBILITY (ROLLATOR, DENIES FALLS), ADLS, AND IADLS  CURRENT IMPAIRMENTS INCLUDE PAIN, DECREASED ACTIVITY TOLERANCE, ONGOING WORD FINDING CHALLENGES (PER PATIENT WHEN SHE BECOMES NERVOUS), AND GAIT DEVIATIONS  DURING PT EVALUATION PATIENT RECEIVED SEATED IN CHAIR  SHE PERFORMED SIT<-->STAND TRANSFERS AND AMBULATION S LEVEL  PATIENT AMBULATED 10 FEET X2 W/ USE OF RW PRESENTING WITH SLOW, STEP TO GAIT PATTERN SECONDARY TO R HIP PAIN  REPORTS WALKING TO FEEL NORMAL FOR HER  DENIES QUESTIONS/CONCERNS ABOUT D/C HOME  PT D/C RECOMMENDATION IS FOR HOME WITH FAMILY ASSISTANCE PRN  WILL CONTINUE TO PROVIDE SKILLED PT SERVICES TO PROGRESS AMBULATION, TRIAL 1 STEP, AND ESTABLISH LE HEP      Goals   Patient Goals TO GO HOME    LTG Expiration Date 08/11/21   Long Term Goal #1 1) PERFORM BED MOBILITY MOD-I TO PARTICIPATE IN FREQUENT REPOSITIONING AND IMPROVE SKIN INTEGRITY; 2) PERFORM SIT<-->STAND TRANSFER MOD-I TO PROMOTE I WITH TOILETING AND OOB MOBILITY; 3) AMBULATE 100 FEET MOD-I W/ LEAST RESTRICTIVE DEVICE TO PARTICIPATE IN HOUSEHOLD AND COMMUNITY LEVEL AMBULATION; 4) IMPROVE B/L LE STRENGTH BY 1/2 GRADE TO IMPROVE EFFICIENCY OF TRANSFERS; 5) IMPROVE BALANCE BY 1 GRADE TO REDUCE RISK FOR FALLS; 6) NAVIGATE 1 STEPS S LEVEL IN ORDER TO SAFELY NAVIGATE IN/OUT OF HOME   PT Treatment Day 0   Plan   Treatment/Interventions Functional transfer training;LE strengthening/ROM; Elevations; Therapeutic exercise; Endurance training;Patient/family training;Equipment eval/education; Bed mobility;Gait training;OT;Spoke to nursing   PT Frequency Other (Comment)  (3-5X/WK)   Recommendation   PT Discharge Recommendation No rehabilitation needs  (HOME W/ FAMILY ASSIST PRN )   Equipment Recommended Walker  (HAS ROLLATOR )   PT - OK to Discharge Yes  (HOME W/ FAMILY ASSIST PRN )   AM-PAC Basic Mobility Inpatient   Turning in Bed Without Bedrails 4   Lying on Back to Sitting on Edge of Flat Bed 4   Moving Bed to Chair 4   Standing Up From Chair 4   Walk in Room 4   Climb 3-5 Stairs 3   Basic Mobility Inpatient Raw Score 23   Basic Mobility Standardized Score 50 88     The patient's AM-PAC Basic Mobility Inpatient Short Form Raw Score is 23, Standardized Score is 50  88  A standardized score greater than 42 9 suggests the patient may benefit from discharge to home  Please also refer to the recommendation of the Physical Therapist for safe discharge planning          Keanu Archibald, PT, DPT

## 2021-07-28 NOTE — ED PROCEDURE NOTE
PROCEDURE  CriticalCare Time  Performed by: Maddie Case MD  Authorized by: Maddie Case MD     Critical care provider statement:     Critical care time (minutes):  35    Critical care time was exclusive of:  Separately billable procedures and treating other patients and teaching time    Critical care was necessary to treat or prevent imminent or life-threatening deterioration of the following conditions:  CNS failure or compromise    Critical care was time spent personally by me on the following activities:  Obtaining history from patient or surrogate, discussions with consultants, evaluation of patient's response to treatment, examination of patient, ordering and review of laboratory studies, ordering and review of radiographic studies and re-evaluation of patient's condition         Maddie Case MD  07/28/21 Jake Wellington MD  08/05/21 [id number]

## 2021-07-28 NOTE — PLAN OF CARE
Problem: PHYSICAL THERAPY ADULT  Goal: Performs mobility at highest level of function for planned discharge setting  See evaluation for individualized goals  Description: Treatment/Interventions: Functional transfer training, LE strengthening/ROM, Elevations, Therapeutic exercise, Endurance training, Patient/family training, Equipment eval/education, Bed mobility, Gait training, OT, Spoke to nursing  Equipment Recommended: Walker (HAS ROLLATOR )       See flowsheet documentation for full assessment, interventions and recommendations  Note: Prognosis: Good  Problem List: Decreased strength, Decreased endurance, Decreased mobility, Impaired balance, Pain  Assessment: PT COMPLETED EVALUATION OF 81YEAR OLD FEMALE AD[facility]ITTED TO [facility] ON 7/27/2021 WITH WORD FINDING DIFFICULTY  PATIENT PLACED ON STROKE PATHWAY  MRI BRAIN (-) FOR ACUTE ABNORMALITY  CURRENT STATUS INSTABILITIES INCLUDE PAIN (CHRONIC RIGHT HIP), PENDING ECHO, NEURO CHECKS, AND TELEMETRY MONITORING  PMH IS SIGNIFICANT FOR RIGHT HIP INJECTION (2019), OSTEOPENIA, ANXIETY, AND HTN  PRIOR TO THIS ADMISSION PATIENT RESIDED WITH HER DGHT (HOME WITH PATIENT ALL OF THE TIME), SON IN LAW, AND 2 ADULT GRANDSONS IN A MULTILEVEL HOME (1 JULIO AND STAIR GLIDE TO 2ND FLOOR)  AT HER BASELINE PATIENT IS I WITH MOBILITY (ROLLATOR, DENIES FALLS), ADLS, AND IADLS  CURRENT IMPAIRMENTS INCLUDE PAIN, DECREASED ACTIVITY TOLERANCE, ONGOING WORD FINDING CHALLENGES (PER PATIENT WHEN SHE BECOMES NERVOUS), AND GAIT DEVIATIONS  DURING PT EVALUATION PATIENT RECEIVED SEATED IN CHAIR  SHE PERFORMED SIT<-->STAND TRANSFERS AND AMBULATION S LEVEL  PATIENT AMBULATED 10 FEET X2 W/ USE OF RW PRESENTING WITH SLOW, STEP TO GAIT PATTERN SECONDARY TO R HIP PAIN  REPORTS WALKING TO FEEL NORMAL FOR HER  DENIES QUESTIONS/CONCERNS ABOUT D/C HOME  PT D/C RECOMMENDATION IS FOR HOME WITH FAMILY ASSISTANCE PRN   WILL CONTINUE TO PROVIDE SKILLED PT SERVICES TO PROGRESS AMBULATION, TRIAL 1 STEP, AND ESTABLISH LE HEP  PT Discharge Recommendation: (S) No rehabilitation needs (HOME W/ FAMILY ASSIST PRN )     PT - OK to Discharge: Yes (HOME W/ FAMILY ASSIST PRN )    See flowsheet documentation for full assessment

## 2021-07-28 NOTE — PLAN OF CARE
Problem: MOBILITY - ADULT  Goal: Maintain or return to baseline ADL function  Description: INTERVENTIONS:  -  Assess patient's ability to carry out ADLs; assess patient's baseline for ADL function and identify physical deficits which impact ability to perform ADLs (bathing, care of mouth/teeth, toileting, grooming, dressing, etc )  - Assess/evaluate cause of self-care deficits   - Assess range of motion  - Assess patient's mobility; develop plan if impaired  - Assess patient's need for assistive devices and provide as appropriate  - Encourage maximum independence but intervene and supervise when necessary  - Involve family in performance of ADLs  - Assess for home care needs following discharge   - Consider OT consult to assist with ADL evaluation and planning for discharge  - Provide patient education as appropriate  Outcome: Progressing  Goal: Maintains/Returns to pre admission functional level  Description: INTERVENTIONS:  - Perform BMAT or MOVE assessment daily    - Set and communicate daily mobility goal to care team and patient/family/caregiver     - Collaborate with rehabilitation services on mobility goals if consulted  Problem: PAIN - ADULT  Goal: Verbalizes/displays adequate comfort level or baseline comfort level  Description: Interventions:  - Encourage patient to monitor pain and request assistance  - Assess pain using appropriate pain scale  - Administer analgesics based on type and severity of pain and evaluate response  - Implement non-pharmacological measures as appropriate and evaluate response  - Consider cultural and social influences on pain and pain management  - Notify physician/advanced practitioner if interventions unsuccessful or patient reports new pain  Outcome: Progressing     Problem: INFECTION - ADULT  Goal: Absence or prevention of progression during hospitalization  Description: INTERVENTIONS:  - Assess and monitor for signs and symptoms of infection  - Monitor lab/diagnostic results  - Monitor all insertion sites, i e  indwelling lines, tubes, and drains  - Monitor endotracheal if appropriate and nasal secretions for changes in amount and color  - [place] appropriate cooling/warming therapies per order  - Administer medications as ordered  - Instruct and encourage patient and family to use good hand hygiene technique  - Identify and instruct in appropriate isolation precautions for identified infection/condition  Outcome: Progressing  Goal: Absence of fever/infection during neutropenic period  Description: INTERVENTIONS:  - Monitor WBC    Outcome: Progressing     Problem: SAFETY ADULT  Goal: Maintain or return to baseline ADL function  Description: INTERVENTIONS:  -  Assess patient's ability to carry out ADLs; assess patient's baseline for ADL function and identify physical deficits which impact ability to perform ADLs (bathing, care of mouth/teeth, toileting, grooming, dressing, etc )  - Assess/evaluate cause of self-care deficits   - Assess range of motion  - Assess patient's mobility; develop plan if impaired  - Assess patient's need for assistive devices and provide as appropriate  - Encourage maximum independence but intervene and supervise when necessary  - Involve family in performance of ADLs  - Assess for home care needs following discharge   - Consider OT consult to assist with ADL evaluation and planning for discharge  - Provide patient education as appropriate  Outcome: Progressing  Goal: Maintains/Returns to pre admission functional level  Description: INTERVENTIONS:  - Perform BMAT or MOVE assessment daily    - Set and communicate daily mobility goal to care team and patient/family/caregiver     - Collaborate with rehabilitation services on mobility goals if consulted  - Out of bed for toileting  - Record patient progress and toleration of activity level   Outcome: Progressing  Goal: Patient will remain free of falls  Description: INTERVENTIONS:  - Educate patient/family on patient safety including physical limitations  - Instruct patient to call for assistance with activity   - Consult OT/PT to assist with strengthening/mobility   - Keep Call bell within reach  - Keep bed low and locked with side rails adjusted as appropriate  - Keep care items and personal belongings within reach  - Initiate and maintain comfort rounds  - Make Fall Risk Sign visible to staff  - Offer Toileting every two   Problem: DISCHARGE PLANNING  Goal: Discharge to home or other facility with appropriate resources  Description: INTERVENTIONS:  - Identify barriers to discharge w/patient and caregiver  - Arrange for needed discharge resources and transportation as appropriate  - Identify discharge learning needs (meds, wound care, etc )  - Arrange for interpretive services to assist at discharge as needed  - Refer to Case Management Department for coordinating discharge planning if the patient needs post-hospital services based on physician/advanced practitioner order or complex needs related to functional status, cognitive ability, or social support system  Outcome: Progressing     Problem: Knowledge Deficit  Goal: Patient/family/caregiver demonstrates understanding of disease process, treatment plan, medications, and discharge instructions  Description: Complete learning assessment and assess knowledge base  Interventions:  - Provide teaching at level of understanding  - Provide teaching via preferred learning methods  Outcome: Progressing     Problem: Neurological Deficit  Goal: Neurological status is stable or improving  Description: Interventions:  - Monitor and assess patient's level of consciousness, motor function, sensory function, and level of assistance needed for ADLs  - Monitor and report changes from baseline  Collaborate with interdisciplinary team to initiate plan and implement interventions as ordered  - Provide and maintain a safe environment  - Consider seizure precautions    - Consider fall precautions  - Consider aspiration precautions  - Consider bleeding precautions  Outcome: Progressing     Problem: Activity Intolerance/Impaired Mobility  Goal: Mobility/activity is maintained at optimum level for patient  Description: Interventions:  - Assess and monitor patient  barriers to mobility and need for assistive/adaptive devices  - Assess patient's emotional response to limitations  - Collaborate with interdisciplinary team and initiate plans and interventions as ordered  - Encourage independent activity per ability   - Maintain proper body alignment  - Perform active/passive rom as tolerated/ordered  - Plan activities to conserve energy   - Turn patient as appropriate  Outcome: Progressing     Problem: Communication Impairment  Goal: Ability to express needs and understand communication  Description: Assess patient's communication skills and ability to understand information  Patient will demonstrate use of effective communication techniques, alternative methods of communication and understanding even if not able to speak  - Encourage communication and provide alternate methods of communication as needed  - Collaborate with case management/ for discharge needs  - Include patient/family/caregiver in decisions related to communication  Outcome: Progressing     Problem: Potential for Aspiration  Goal: Non-ventilated patient's risk of aspiration is minimized  Description: Assess and monitor vital signs, respiratory status, and labs (WBC)  Monitor for signs of aspiration (tachypnea, cough, rales, wheezing, cyanosis, fever)  - Assess and monitor patient's ability to swallow  - Place patient up in chair to eat if possible  - HOB up at 90 degrees to eat if unable to get patient up into chair   - Supervise patient during oral intake  - Instruct patient/ family to take small bites  - Instruct patient/ family to take small single sips when taking liquids    - Follow patient-specific strategies generated by speech pathologist   Outcome: Progressing  Goal: Ventilated patient's risk of aspiration is minimized  Description: Assess and monitor vital signs, respiratory status, airway cuff pressure, and labs (WBC)  Monitor for signs of aspiration (tachypnea, cough, rales, wheezing, cyanosis, fever)  - Elevate head of bed 30 degrees if patient has tube feeding   - Monitor tube feeding  Outcome: Progressing     Problem: Nutrition  Goal: Nutrition/Hydration status is improving  Description: Monitor and assess patient's nutrition/hydration status for malnutrition (ex- brittle hair, bruises, dry skin, pale skin and conjunctiva, muscle wasting, smooth red tongue, and disorientation)  Collaborate with interdisciplinary team and initiate plan and interventions as ordered  Monitor patient's weight and dietary intake as ordered or per policy  Utilize nutrition screening tool and intervene per policy  Determine patient's food preferences and provide high-protein, high-caloric foods as appropriate  - Assist patient with eating   - Allow adequate time for meals   - Encourage patient to take dietary supplement as ordered  - Collaborate with clinical nutritionist   - Include patient/family/caregiver in decisions related to nutrition    Outcome: Progressing     Problem: Potential for Falls  Goal: Patient will remain free of falls  Description: INTERVENTIONS:  - Educate patient/family on patient safety including physical limitations  - Instruct patient to call for assistance with activity   - Consult OT/PT to assist with strengthening/mobility   - Keep Call bell within reach  - Keep bed low and locked with side rails adjusted as appropriate  - Keep care items and personal belongings within reach  - Initiate and maintain comfort rounds  - Make Fall Risk Sign visible to staff  - Apply yellow socks and bracelet for high fall risk patients  - Consider moving patient to room near nurses station  Outcome: Progressing   Hours, in advance of need  - Initiate/Maintain bed alarm  - Apply yellow socks and bracelet for high fall risk patients  - Consider moving patient to room near nurses station  Outcome: Progressing     - Out of bed for toileting  - Record patient progress and toleration of activity level   Outcome: Progressing

## 2021-07-28 NOTE — CASE MANAGEMENT
LOS: Day 1  Bundle: pt is not a bundle  Readmission risk: pt is not a 30 day readmit    CM reviewed role with pt's dtr/CHAYITO Patel at [phone]  [employer] reported that the pt lives with her in a 2 story home with 2 JULIO, the home has a stair glide for access to the 2nd floor  Pt has a walker, cane, SC and a BSC in the home  Pt reportedly moderately IPTA with ADLs  Pt has a hx of VNA with [facility], no hx of STR  Pt has a hx of OPPT with [facility]  PCP is Dr Avelina Nicholson; pharmacy of choice is [facility] [place] on [address] in [facility]  Alexandra Company reported the pt hasa hx of anxiety and receives medication through PCP  No hx od D&A  Alexandra Company confirmed the pt also has a LW  CM reviewed d/c planning process including the following: identifying help at home, patient preference for d/c planning needs, Discharge Lounge, [facility] Meds to Bed program, availability of treatment team to discuss questions or concerns patient and/or family may have regarding understanding medications and recognizing signs and symptoms once discharged  CM also encouraged patient to follow up with all recommended appointments after discharge  Patient advised of importance for patient and family to participate in managing patients medical well being

## 2021-07-29 VITALS
DIASTOLIC BLOOD PRESSURE: 76 MMHG | SYSTOLIC BLOOD PRESSURE: 136 MMHG | HEART RATE: 82 BPM | WEIGHT: 117.73 LBS | RESPIRATION RATE: 20 BRPM | TEMPERATURE: 97.9 F | OXYGEN SATURATION: 96 % | BODY MASS INDEX: 20.85 KG/M2

## 2021-07-29 DIAGNOSIS — F41.0 ANXIETY ATTACK: Primary | ICD-10-CM

## 2021-07-29 LAB
ANION GAP SERPL CALCULATED.3IONS-SCNC: 6 MMOL/L (ref 4–13)
BASOPHILS # BLD AUTO: 0.05 THOUSANDS/ΜL (ref 0–0.1)
BASOPHILS NFR BLD AUTO: 1 % (ref 0–1)
BUN SERPL-MCNC: 11 MG/DL (ref 5–25)
CALCIUM SERPL-MCNC: 9.7 MG/DL (ref 8.3–10.1)
CHLORIDE SERPL-SCNC: 109 MMOL/L (ref 100–108)
CO2 SERPL-SCNC: 26 MMOL/L (ref 21–32)
CREAT SERPL-MCNC: 0.65 MG/DL (ref 0.6–1.3)
EOSINOPHIL # BLD AUTO: 0.21 THOUSAND/ΜL (ref 0–0.61)
EOSINOPHIL NFR BLD AUTO: 3 % (ref 0–6)
ERYTHROCYTE [DISTWIDTH] IN BLOOD BY AUTOMATED COUNT: 13 % (ref 11.6–15.1)
GFR SERPL CREATININE-BSD FRML MDRD: 81 ML/MIN/1.73SQ M
GLUCOSE SERPL-MCNC: 77 MG/DL (ref 65–140)
HCT VFR BLD AUTO: 38.8 % (ref 34.8–46.1)
HGB BLD-MCNC: 12.8 G/DL (ref 11.5–15.4)
IMM GRANULOCYTES # BLD AUTO: 0.01 THOUSAND/UL (ref 0–0.2)
IMM GRANULOCYTES NFR BLD AUTO: 0 % (ref 0–2)
LYMPHOCYTES # BLD AUTO: 1.78 THOUSANDS/ΜL (ref 0.6–4.47)
LYMPHOCYTES NFR BLD AUTO: 26 % (ref 14–44)
MCH RBC QN AUTO: 31.7 PG (ref 26.8–34.3)
MCHC RBC AUTO-ENTMCNC: 33 G/DL (ref 31.4–37.4)
MCV RBC AUTO: 96 FL (ref 82–98)
MONOCYTES # BLD AUTO: 0.51 THOUSAND/ΜL (ref 0.17–1.22)
MONOCYTES NFR BLD AUTO: 7 % (ref 4–12)
NEUTROPHILS # BLD AUTO: 4.3 THOUSANDS/ΜL (ref 1.85–7.62)
NEUTS SEG NFR BLD AUTO: 63 % (ref 43–75)
NRBC BLD AUTO-RTO: 0 /100 WBCS
PLATELET # BLD AUTO: 223 THOUSANDS/UL (ref 149–390)
PMV BLD AUTO: 11.4 FL (ref 8.9–12.7)
POTASSIUM SERPL-SCNC: 4 MMOL/L (ref 3.5–5.3)
RBC # BLD AUTO: 4.04 MILLION/UL (ref 3.81–5.12)
SODIUM SERPL-SCNC: 141 MMOL/L (ref 136–145)
WBC # BLD AUTO: 6.86 THOUSAND/UL (ref 4.31–10.16)

## 2021-07-29 PROCEDURE — 85025 COMPLETE CBC W/AUTO DIFF WBC: CPT | Performed by: STUDENT IN AN ORGANIZED HEALTH CARE EDUCATION/TRAINING PROGRAM

## 2021-07-29 PROCEDURE — 86592 SYPHILIS TEST NON-TREP QUAL: CPT | Performed by: PHYSICIAN ASSISTANT

## 2021-07-29 PROCEDURE — 80048 BASIC METABOLIC PNL TOTAL CA: CPT | Performed by: STUDENT IN AN ORGANIZED HEALTH CARE EDUCATION/TRAINING PROGRAM

## 2021-07-29 RX ORDER — ASPIRIN 81 MG/1
81 TABLET, CHEWABLE ORAL DAILY
Qty: 30 TABLET | Refills: 0 | Status: SHIPPED | OUTPATIENT
Start: 2021-07-30

## 2021-07-29 RX ORDER — ATORVASTATIN CALCIUM 40 MG/1
40 TABLET, FILM COATED ORAL EVERY EVENING
Qty: 30 TABLET | Refills: 0 | Status: SHIPPED | OUTPATIENT
Start: 2021-07-29 | End: 2021-09-07

## 2021-07-29 RX ADMIN — PANTOPRAZOLE SODIUM 40 MG: 40 TABLET, DELAYED RELEASE ORAL at 05:03

## 2021-07-29 RX ADMIN — METOPROLOL TARTRATE 25 MG: 25 TABLET, FILM COATED ORAL at 08:39

## 2021-07-29 RX ADMIN — ENOXAPARIN SODIUM 40 MG: 40 INJECTION SUBCUTANEOUS at 08:36

## 2021-07-29 RX ADMIN — AMLODIPINE BESYLATE 5 MG: 5 TABLET ORAL at 08:39

## 2021-07-29 RX ADMIN — ASPIRIN 81 MG CHEWABLE TABLET 81 MG: 81 TABLET CHEWABLE at 08:36

## 2021-07-29 RX ADMIN — CYANOCOBALAMIN TAB 500 MCG 1000 MCG: 500 TAB at 08:36

## 2021-07-29 NOTE — TELEPHONE ENCOUNTER
Lorazapam 0 5mg - This Rx was deactivated and unable to be processed (Pharmacy noticed this when the patient's daughter came to pick it up  She was upset and concerned to be stopping the medication cold turkey)  They wanted to give us a heads up that we may be hearing from patient's family  If you want to re-start this medication, it would need to be resent to [facility]

## 2021-07-29 NOTE — TELEPHONE ENCOUNTER
Pt's daughter Ria Odonnell just called and asked if you would reconsider stopping this med  Brenna Tommy has been on it for years and Rollins Odonnell is worried her mother will have trouble doing without it  Ria Odonnell would like to talk to you regarding this thanks

## 2021-07-29 NOTE — DISCHARGE INSTRUCTIONS
Chronic Hypertension, Ambulatory Care   GENERAL INFORMATION:   Chronic hypertension  is a long-term condition in which your blood pressure (BP) is higher than normal  Your BP is the force of your blood moving against the walls of your arteries  Hypertension is a BP of 140/90 or higher  Common symptoms include the following:   · Headache     · Blurred vision    · Chest pain     · Dizziness or weakness     · Trouble breathing     · Nosebleeds  Seek immediate care for the following symptoms:   · Severe headache or vision loss    · Weakness in an arm or leg    · Confusion or difficulty speaking    · Discomfort in your chest that feels like squeezing, pressure, fullness, or pain    · Suddenly feeling lightheaded or trouble breathing    · Pain or discomfort in your back, neck, jaw, stomach, or arm  Treatment for chronic hypertension  may include medicine to lower your BP  You may also need to make lifestyle changes  Take your medicine exactly as directed  Manage chronic hypertension:   · Take your BP at home  Sit and rest for 5 minutes before you take your BP  Extend your arm and support it on a flat surface  Your arm should be at the same level as your heart  Follow the directions that came with your BP monitor  If possible, take at least 2 BP readings each time  Take your BP at least twice a day at the same times each day, such as morning and evening  Keep a log of your BP readings and bring it to your follow-up visits  · Eat less sodium (salt)  Do not add sodium to your food  Limit foods that are high in sodium, such as canned foods, potato chips, and cold cuts  Your healthcare provider may suggest that you follow the [address] [place] [address] Street  The plan is low in sodium, unhealthy fats, and total fat  It is high in potassium, calcium, and fiber  · Exercise regularly  Exercise at least 30 minutes per day, on most days of the week  This will help decrease your BP   Ask your healthcare provider about the best exercise plan for you  · Limit alcohol  Women should limit alcohol to 1 drink a day  Men should limit alcohol to 2 drinks a day  A drink of alcohol is 12 ounces of beer, 5 ounces of wine, or 1½ ounces of liquor  · Do not smoke  If you smoke, it is never too late to quit  Smoking can increase your BP  Smoking also worsens other health conditions you may have that can increase your risk for hypertension  Ask your healthcare provider for information if you need help quitting  Follow up with your healthcare provider as directed: You will need to return to have your BP checked and to have other lab tests done  Write down your questions so you remember to ask them during your visits  CARE AGREEMENT:   You have the right to help plan your care  Learn about your health condition and how it may be treated  Discuss treatment options with your caregivers to decide what care you want to receive  You always have the right to refuse treatment  The above information is an  only  It is not intended as medical advice for individual conditions or treatments  Talk to your doctor, nurse or pharmacist before following any medical regimen to see if it is safe and effective for you  © 2014 [address] is for End User's use only and may not be sold, redistributed or otherwise used for commercial purposes  All illustrations and images included in CareNotes® are the copyrighted property of A D A M [employer]  or [employer]

## 2021-07-29 NOTE — PLAN OF CARE
Problem: MOBILITY - ADULT  Goal: Maintain or return to baseline ADL function  Description: INTERVENTIONS:  -  Assess patient's ability to carry out ADLs; assess patient's baseline for ADL function and identify physical deficits which impact ability to perform ADLs (bathing, care of mouth/teeth, toileting, grooming, dressing, etc )  - Assess/evaluate cause of self-care deficits   - Assess range of motion  - Assess patient's mobility; develop plan if impaired  - Assess patient's need for assistive devices and provide as appropriate  - Encourage maximum independence but intervene and supervise when necessary  - Involve family in performance of ADLs  - Assess for home care needs following discharge   - Consider OT consult to assist with ADL evaluation and planning for discharge  - Provide patient education as appropriate  Outcome: Adequate for Discharge  Goal: Maintains/Returns to pre admission functional level  Description: INTERVENTIONS:  - Perform BMAT or MOVE assessment daily    - Set and communicate daily mobility goal to care team and patient/family/caregiver  - Collaborate with rehabilitation services on mobility goals if consulted  - Perform Range of Motion  times a day  - Reposition patient every  hours    - Dangle patient  times a day  - Stand patient  times a day  - Ambulate patient  times a day  - Out of bed to chair times a day   - Out of bed for meals  times a day  - Out of bed for toileting  - Record patient progress and toleration of activity level   Outcome: Adequate for Discharge     Problem: PAIN - ADULT  Goal: Verbalizes/displays adequate comfort level or baseline comfort level  Description: Interventions:  - Encourage patient to monitor pain and request assistance  - Assess pain using appropriate pain scale  - Administer analgesics based on type and severity of pain and evaluate response  - Implement non-pharmacological measures as appropriate and evaluate response  - Consider cultural and social influences on pain and pain management  - Notify physician/advanced practitioner if interventions unsuccessful or patient reports new pain  Outcome: Adequate for Discharge     Problem: INFECTION - ADULT  Goal: Absence or prevention of progression during hospitalization  Description: INTERVENTIONS:  - Assess and monitor for signs and symptoms of infection  - Monitor lab/diagnostic results  - Monitor all insertion sites, i e  indwelling lines, tubes, and drains  - Monitor endotracheal if appropriate and nasal secretions for changes in amount and color  - [place] appropriate cooling/warming therapies per order  - Administer medications as ordered  - Instruct and encourage patient and family to use good hand hygiene technique  - Identify and instruct in appropriate isolation precautions for identified infection/condition  Outcome: Adequate for Discharge  Goal: Absence of fever/infection during neutropenic period  Description: INTERVENTIONS:  - Monitor WBC    Outcome: Adequate for Discharge     Problem: SAFETY ADULT  Goal: Maintain or return to baseline ADL function  Description: INTERVENTIONS:  -  Assess patient's ability to carry out ADLs; assess patient's baseline for ADL function and identify physical deficits which impact ability to perform ADLs (bathing, care of mouth/teeth, toileting, grooming, dressing, etc )  - Assess/evaluate cause of self-care deficits   - Assess range of motion  - Assess patient's mobility; develop plan if impaired  - Assess patient's need for assistive devices and provide as appropriate  - Encourage maximum independence but intervene and supervise when necessary  - Involve family in performance of ADLs  - Assess for home care needs following discharge   - Consider OT consult to assist with ADL evaluation and planning for discharge  - Provide patient education as appropriate  Outcome: Adequate for Discharge  Goal: Maintains/Returns to pre admission functional level  Description: INTERVENTIONS:  - Perform BMAT or MOVE assessment daily    - Set and communicate daily mobility goal to care team and patient/family/caregiver  - Collaborate with rehabilitation services on mobility goals if consulted  - Perform Range of Motion  times a day  - Reposition patient every  hours    - Dangle patient times a day  - Stand patient  times a day  - Ambulate patient  times a day  - Out of bed to chair  times a day   - Out of bed for meals  times a day  - Out of bed for toileting  - Record patient progress and toleration of activity level   Outcome: Adequate for Discharge  Goal: Patient will remain free of falls  Description: INTERVENTIONS:  - Educate patient/family on patient safety including physical limitations  - Instruct patient to call for assistance with activity   - Consult OT/PT to assist with strengthening/mobility   - Keep Call bell within reach  - Keep bed low and locked with side rails adjusted as appropriate  - Keep care items and personal belongings within reach  - Initiate and maintain comfort rounds  - Make Fall Risk Sign visible to staff  - Offer Toileting every  Hours, in advance of need  - Initiate/Maintain alarm  - Obtain necessary fall risk management equipment:   - Apply yellow socks and bracelet for high fall risk patients  - Consider moving patient to room near nurses station  Outcome: Adequate for Discharge     Problem: DISCHARGE PLANNING  Goal: Discharge to home or other facility with appropriate resources  Description: INTERVENTIONS:  - Identify barriers to discharge w/patient and caregiver  - Arrange for needed discharge resources and transportation as appropriate  - Identify discharge learning needs (meds, wound care, etc )  - Arrange for interpretive services to assist at discharge as needed  - Refer to Case Management Department for coordinating discharge planning if the patient needs post-hospital services based on physician/advanced practitioner order or complex needs related to functional status, cognitive ability, or social support system  Outcome: Adequate for Discharge     Problem: Knowledge Deficit  Goal: Patient/family/caregiver demonstrates understanding of disease process, treatment plan, medications, and discharge instructions  Description: Complete learning assessment and assess knowledge base  Interventions:  - Provide teaching at level of understanding  - Provide teaching via preferred learning methods  Outcome: Adequate for Discharge     Problem: Neurological Deficit  Goal: Neurological status is stable or improving  Description: Interventions:  - Monitor and assess patient's level of consciousness, motor function, sensory function, and level of assistance needed for ADLs  - Monitor and report changes from baseline  Collaborate with interdisciplinary team to initiate plan and implement interventions as ordered  - Provide and maintain a safe environment  - Consider seizure precautions  - Consider fall precautions  - Consider aspiration precautions  - Consider bleeding precautions  Outcome: Adequate for Discharge     Problem: Activity Intolerance/Impaired Mobility  Goal: Mobility/activity is maintained at optimum level for patient  Description: Interventions:  - Assess and monitor patient  barriers to mobility and need for assistive/adaptive devices  - Assess patient's emotional response to limitations  - Collaborate with interdisciplinary team and initiate plans and interventions as ordered  - Encourage independent activity per ability   - Maintain proper body alignment  - Perform active/passive rom as tolerated/ordered  - Plan activities to conserve energy   - Turn patient as appropriate  Outcome: Adequate for Discharge     Problem: Communication Impairment  Goal: Ability to express needs and understand communication  Description: Assess patient's communication skills and ability to understand information    Patient will demonstrate use of effective communication techniques, alternative methods of communication and understanding even if not able to speak  - Encourage communication and provide alternate methods of communication as needed  - Collaborate with case management/ for discharge needs  - Include patient/family/caregiver in decisions related to communication  Outcome: Adequate for Discharge     Problem: Potential for Aspiration  Goal: Non-ventilated patient's risk of aspiration is minimized  Description: Assess and monitor vital signs, respiratory status, and labs (WBC)  Monitor for signs of aspiration (tachypnea, cough, rales, wheezing, cyanosis, fever)  - Assess and monitor patient's ability to swallow  - Place patient up in chair to eat if possible  - HOB up at 90 degrees to eat if unable to get patient up into chair   - Supervise patient during oral intake  - Instruct patient/ family to take small bites  - Instruct patient/ family to take small single sips when taking liquids  - Follow patient-specific strategies generated by speech pathologist   Outcome: Adequate for Discharge  Goal: Ventilated patient's risk of aspiration is minimized  Description: Assess and monitor vital signs, respiratory status, airway cuff pressure, and labs (WBC)  Monitor for signs of aspiration (tachypnea, cough, rales, wheezing, cyanosis, fever)  - Elevate head of bed 30 degrees if patient has tube feeding   - Monitor tube feeding  Outcome: Adequate for Discharge     Problem: Nutrition  Goal: Nutrition/Hydration status is improving  Description: Monitor and assess patient's nutrition/hydration status for malnutrition (ex- brittle hair, bruises, dry skin, pale skin and conjunctiva, muscle wasting, smooth red tongue, and disorientation)  Collaborate with interdisciplinary team and initiate plan and interventions as ordered  Monitor patient's weight and dietary intake as ordered or per policy   Utilize nutrition screening tool and intervene per policy  Determine patient's food preferences and provide high-protein, high-caloric foods as appropriate  - Assist patient with eating   - Allow adequate time for meals   - Encourage patient to take dietary supplement as ordered  - Collaborate with clinical nutritionist   - Include patient/family/caregiver in decisions related to nutrition    Outcome: Adequate for Discharge     Problem: Potential for Falls  Goal: Patient will remain free of falls  Description: INTERVENTIONS:  - Educate patient/family on patient safety including physical limitations  - Instruct patient to call for assistance with activity   - Consult OT/PT to assist with strengthening/mobility   - Keep Call bell within reach  - Keep bed low and locked with side rails adjusted as appropriate  - Keep care items and personal belongings within reach  - Initiate and maintain comfort rounds  - Make Fall Risk Sign visible to staff  - Offer Toileting every Hours, in advance of need  - Initiate/Maintain alarm  - Obtain necessary fall risk management equipment:   - Apply yellow socks and bracelet for high fall risk patients  - Consider moving patient to room near nurses station  Outcome: Adequate for Discharge

## 2021-07-29 NOTE — DISCHARGE SUMMARY
[facility] Discharge Summary - Jcarlos Jennings 81 y o  female MRN: [id number]    [address] [facility]  [facility] Bed: [facility]77 Encounter: [id number]    BRIEF OVERVIEW    Admitting Provider: Ketty Pena MD  Discharge Provider: No att  providers found  Primary Care Physician at Discharge: Ketty Pena MD    Discharge To: Home  Facility / Family Member Name: Kye Watters (Daughter)  Phone Number: [phone]    Admission Date: 7/27/2021     Discharge Date: 7/29/2021  1:10 PM    Primary Discharge Diagnosis  Principal Problem:    Word finding difficulty  Active Problems:    HTN (hypertension)    Mild cognitive impairment  Resolved Problems:    * No resolved hospital problems  *      * Word finding difficulty  Assessment & Plan  Patient noticed some word finding difficulties while watching the Olympics with her daughter  Patient was hypertensive in the 200s in the ED  She took her metoprolol this am but not the amlodipine  CT was unremarkable  ABCD2 was 4 which is moderate risk  MRI was unremarkable  Echo was normal       Plan  -- Continue 81 mg aspirin daily, Continue Lipitor 40 mg    -- F/u with PCP and Neuro outpatient           Mild cognitive impairment  Assessment & Plan  Patient has memory retrieval difficulties  Patient recently moved in with her youngest daughter as she left a pot of boiling water on and forgot about it  B12 level 321, Folate 6 5    Plan  - Per neuro started B12 1000 mg PO daily   - Recommend full neurocognitive evaluation as outpatient    HTN (hypertension)  Assessment & Plan      Plan:  - Continue home Metoprolol 25 BID and amlodipine 5 mg            Consulting Providers   Neurology         Therapeutic Operative Procedures Performed  None    Diagnostic Procedures Performed  radiology: MRI: White matter changes suggestive of chronic microangiopathy  No acute intracranial pathology  and CT scan: No acute intracranial abnormality    Microangiopathic changes  Discharge Disposition: Home/Self Care  Discharged With Lines: no    Test Results Pending at Discharge: none    Outpatient Follow-Up  yes      Follow up: [facility] SHREE Lozano  Date and time: TBD  Location: [address]       Follow up: General PCP  Date and time: Follow up in 1 week   Location: [address] [facility]  Follow up within next: 1 week    Active Issues Requiring Follow-up   none    Code Status: Level 3 - DNAR and DNI  Advance Directive and Living Will: <no information>  Power of :    POLST:      Medications   See after visit summary for reconciled discharge medications provided to patient and family  Allergies  Allergies   Allergen Reactions    Penicillin G Benzathine      Discharge Diet: regular diet  Activity restrictions: none    [address] is a 81 y o  female with past medical history of for HTN, HLD, GERD and chronic constipation who presented with [facility]B on 7/27 with word finding difficulties  Patient states that she was sitting and watching the Olympics when her daughter noticed that she was having a difficult time verbalizing her words  The daughter also noted some left sided arm weakness however the patient denies this symptom  The morning of admission patient admits to taking her metoprolol but not her amlodipine She continued to have aphasia until she got to the hospital  She said in the last few weeks she has had a couple of episodes of "not feeling like herself" but has never had similar symptoms  Patient was loaded with  in the ED  She was given Lipitor 40 mg daily and started ASA 81 mg daily the next day  CT and MRI showed some microangiopathic changes  An Echo was done and showed an EF of 65%  On day of discharge the patient had no complaints and was back to baseline  She denied headache, N/V, diarrhea/constipation  She was told to follow up with her PCP in the next week         Presenting Problem/History of Present Illness  Principal Problem:    Word finding difficulty  Active Problems:    HTN (hypertension)    Mild cognitive impairment  Resolved Problems:    * No resolved hospital problems  *        Other Pertinent Test Results  None     Discharge Condition: fair      Discharge  Statement   I spent 20 minutes minutes discharging the patient  This time was spent on the day of discharge  I had direct contact with the patient on the day of discharge  Additional documentation is required if more than 30 minutes were spent on discharge

## 2021-07-30 ENCOUNTER — TELEPHONE (OUTPATIENT)
Dept: INTERNAL MEDICINE CLINIC | Facility: CLINIC | Age: 86
End: 2021-07-30

## 2021-07-30 ENCOUNTER — TRANSITIONAL CARE MANAGEMENT (OUTPATIENT)
Dept: INTERNAL MEDICINE CLINIC | Facility: CLINIC | Age: 86
End: 2021-07-30

## 2021-07-30 ENCOUNTER — TELEPHONE (OUTPATIENT)
Dept: OBGYN CLINIC | Facility: CLINIC | Age: 86
End: 2021-07-30

## 2021-07-30 LAB — RPR SER QL: NORMAL

## 2021-07-30 RX ORDER — LORAZEPAM 0.5 MG/1
0.5 TABLET ORAL 2 TIMES DAILY PRN
Qty: 60 TABLET | Refills: 0 | Status: SHIPPED | OUTPATIENT
Start: 2021-07-30 | End: 2021-09-23 | Stop reason: SDUPTHER

## 2021-08-02 DIAGNOSIS — K21.9 GASTROESOPHAGEAL REFLUX DISEASE WITHOUT ESOPHAGITIS: ICD-10-CM

## 2021-08-02 DIAGNOSIS — I10 ESSENTIAL HYPERTENSION: ICD-10-CM

## 2021-08-02 RX ORDER — OMEPRAZOLE 20 MG/1
20 CAPSULE, DELAYED RELEASE ORAL DAILY
Qty: 90 CAPSULE | Refills: 0 | Status: SHIPPED | OUTPATIENT
Start: 2021-08-02 | End: 2021-11-09 | Stop reason: SDUPTHER

## 2021-08-03 ENCOUNTER — OFFICE VISIT (OUTPATIENT)
Dept: OBGYN CLINIC | Facility: CLINIC | Age: 86
End: 2021-08-03
Payer: MEDICARE

## 2021-08-03 VITALS
HEART RATE: 85 BPM | BODY MASS INDEX: 20.85 KG/M2 | DIASTOLIC BLOOD PRESSURE: 72 MMHG | SYSTOLIC BLOOD PRESSURE: 138 MMHG | HEIGHT: 63 IN

## 2021-08-03 DIAGNOSIS — M16.11 PRIMARY OSTEOARTHRITIS OF ONE HIP, RIGHT: Primary | ICD-10-CM

## 2021-08-03 PROCEDURE — 99214 OFFICE O/P EST MOD 30 MIN: CPT | Performed by: PHYSICIAN ASSISTANT

## 2021-08-03 RX ORDER — MELOXICAM 7.5 MG/1
7.5 TABLET ORAL DAILY
Qty: 30 TABLET | Refills: 0 | Status: SHIPPED | OUTPATIENT
Start: 2021-08-03

## 2021-08-03 NOTE — PROGRESS NOTES
Patient Name:  Ramesh Torres  MRN:  [id number]    Assessment & Plan     Right hip DJD  1  Referral to Dr Sergei Ruano to evaluate for ultrasound-guided right hip intra-articular corticosteroid injection  2  Prescription for meloxicam 7 5 mg daily as needed  3  Activities as tolerated with modification to avoid pain  4  Follow-up as needed  Chief Complaint     Right hip pain    History of the Present Illness     Ramesh Torres is a 81 y o  female who presents today for initial evaluation of her right hip  She has a known history of right hip DJD  She notes worsening pain recently  Pain is localized to the anterior and posterior aspect of the hip  She notes associated stiffness  She takes Tylenol without significant relief  She has had two intra-articular corticosteroid injections in the past with relief  She has tried physical therapy as well which she states exacerbated her pain  Physical Exam     /72   Pulse 85   Ht 5' 3" (1 6 m)   BMI 20 85 kg/m²     Right hip:  No gross deformity  No tenderness to palpation  Range of motion is flexion 70, external rotation 10, internal rotation 0  Positive logroll test   Positive REBECA test     Eyes: Anicteric sclerae  ENT: Trachea midline  Lungs: Normal respiratory effort  Cardiovascular: Capillary refill is less than 2 seconds  Lymphatic: No palpable lymphadenopathy  Skin: Intact without erythema  Neurologic: Sensation grossly intact to light touch  Psychiatric: Mood and affect are appropriate      Data Review     I have personally reviewed pertinent films in PACS, and my interpretation follows:    X-rays right hip 8/8/19: Severe degenerative changes right hip joint    Past Medical History:   Diagnosis Date    Allergic rhinitis     Anxiety     Diverticulitis of large intestine without perforation or abscess without bleeding     Dysthymic disorder     Gastro-esophageal reflux disease without esophagitis     HLD (hyperlipidemia)     Hypertension     Osteopenia     Palpitations     Paresthesia        Past Surgical History:   Procedure Laterality Date    APPENDECTOMY      CHOLECYSTECTOMY      COLON SURGERY      FL INJECTION RIGHT HIP (NON ARTHROGRAM)  8/28/2019    HYSTERECTOMY         Allergies   Allergen Reactions    Penicillin G Benzathine        Current Outpatient Medications on File Prior to Visit   Medication Sig Dispense Refill    amLODIPine (NORVASC) 5 mg tablet Take 1 tablet (5 mg total) by mouth daily 90 tablet 2    aspirin 81 mg chewable tablet Chew 1 tablet (81 mg total) daily 30 tablet 0    atorvastatin (LIPITOR) 40 mg tablet Take 1 tablet (40 mg total) by mouth every evening 30 tablet 0    latanoprost (XALATAN) 0 005 % ophthalmic solution       LORazepam (ATIVAN) 0 5 mg tablet Take 1 tablet (0 5 mg total) by mouth 2 (two) times a day as needed for anxiety 60 tablet 0    metoprolol tartrate (LOPRESSOR) 25 mg tablet Take 1 tablet (25 mg total) by mouth 2 (two) times a day 180 tablet 0    omeprazole (PriLOSEC) 20 mg delayed release capsule Take 1 capsule (20 mg total) by mouth daily 90 capsule 0     No current facility-administered medications on file prior to visit  Social History     Tobacco Use    Smoking status: Former Smoker    Smokeless tobacco: Never Used   Substance Use Topics    Alcohol use: Not Currently    Drug use: Not Currently       Family History   Problem Relation Age of Onset    Hypertension Mother     Hypertension Father        Review of Systems     As stated in the HPI  All other systems were reviewed and are negative

## 2021-08-05 ENCOUNTER — TELEPHONE (OUTPATIENT)
Dept: NEUROLOGY | Facility: CLINIC | Age: 86
End: 2021-08-05

## 2021-08-05 NOTE — TELEPHONE ENCOUNTER
Sched HFU appt 9/1/2021 with Gene Joya in [facility]  Mailed NP paperwork to pt's home  SLB/ Word-finding difficulty, confusion, left upper extremity weakness/Medicare/[employer]    NOTE FROM CHART:  Reason for Consult / Principal Problem:  Word-finding difficulty, confusion, left upper extremity weakness  Kameron Dylon will need follow up in in 4 weeks with neurovascular attending or advance practitioner  She will not require outpatient neurological testing

## 2021-08-10 ENCOUNTER — OFFICE VISIT (OUTPATIENT)
Dept: INTERNAL MEDICINE CLINIC | Facility: CLINIC | Age: 86
End: 2021-08-10
Payer: MEDICARE

## 2021-08-10 VITALS
DIASTOLIC BLOOD PRESSURE: 74 MMHG | OXYGEN SATURATION: 97 % | HEIGHT: 63 IN | WEIGHT: 115 LBS | BODY MASS INDEX: 20.38 KG/M2 | HEART RATE: 76 BPM | TEMPERATURE: 98.1 F | SYSTOLIC BLOOD PRESSURE: 138 MMHG

## 2021-08-10 DIAGNOSIS — I63.9 SMALL VESSEL STROKE (HCC): Primary | ICD-10-CM

## 2021-08-10 PROCEDURE — 99495 TRANSJ CARE MGMT MOD F2F 14D: CPT | Performed by: INTERNAL MEDICINE

## 2021-08-10 NOTE — PROGRESS NOTES
Assessment/Plan:Follow up  TCM   After  Discharge  From  [facility]  For  TIA  With  No  Documented stroke  Diagnoses and all orders for this visit:    Small vessel stroke ([address] )      Hypertension  Hyperlipidemia  ASCVD    Subjective:      Patient ID: Deyanira Avila is a 81 y o  female  HPI    The following portions of the patient's history were reviewed and updated as appropriate: allergies, current medications, past family history, past medical history, past social history, past surgical history, and problem list     Review of Systems   Constitutional: Negative  HENT: Negative for dental problem, drooling, ear discharge and ear pain  Eyes: Negative for discharge, redness and itching  Respiratory: Negative for apnea, cough and wheezing  Cardiovascular: Negative for chest pain and palpitations  Gastrointestinal: Negative for abdominal pain, blood in stool, diarrhea and vomiting  Endocrine: Negative for polydipsia, polyphagia and polyuria  Genitourinary: Negative for decreased urine volume, dysuria and frequency  Musculoskeletal: Negative for arthralgias, myalgias and neck stiffness  Skin: Negative for pallor and wound  Allergic/Immunologic: Negative for environmental allergies and food allergies  Neurological: Negative for facial asymmetry, light-headedness, numbness and headaches  Hematological: Negative for adenopathy  Does not bruise/bleed easily  Psychiatric/Behavioral: Negative for agitation, behavioral problems and confusion  Objective:      /74 (BP Location: Right arm, Patient Position: Sitting, Cuff Size: Standard)   Pulse 76   Temp 98 1 °F (36 7 °C) (Temporal)   Ht 5' 3" (1 6 m)   Wt 52 2 kg (115 lb)   SpO2 97%   BMI 20 37 kg/m²          Physical Exam  Constitutional:       Appearance: Normal appearance  HENT:      Head: Normocephalic        Nose: Nose normal       Mouth/Throat:      Mouth: Mucous membranes are moist    Eyes:      Pupils: Pupils are equal, round, and reactive to light  Cardiovascular:      Rate and Rhythm: Regular rhythm  Heart sounds: Normal heart sounds  Pulmonary:      Breath sounds: Normal breath sounds  Abdominal:      Palpations: Abdomen is soft  Musculoskeletal:         General: No swelling  Cervical back: Neck supple  Skin:     General: Skin is warm  Neurological:      General: No focal deficit present  Mental Status: She is alert and oriented to person, place, and time  Psychiatric:         Mood and Affect: Mood normal        S/P  TIA   Stable    No  Focal   Neurologic  Findings  Hypertension   Controlled  With  Current meds    Hyperlipidemia    On statin    Fup  4months        Kaylan BUTLER MD

## 2021-08-13 ENCOUNTER — TELEPHONE (OUTPATIENT)
Dept: OTHER | Facility: OTHER | Age: 86
End: 2021-08-13

## 2021-08-23 DIAGNOSIS — G45.9 TIA (TRANSIENT ISCHEMIC ATTACK): ICD-10-CM

## 2021-08-23 DIAGNOSIS — R47.89 WORD FINDING DIFFICULTY: ICD-10-CM

## 2021-09-02 ENCOUNTER — TELEPHONE (OUTPATIENT)
Dept: OBGYN CLINIC | Facility: HOSPITAL | Age: 86
End: 2021-09-02

## 2021-09-02 NOTE — TELEPHONE ENCOUNTER
Patient is coming in to see Dr Deisy Dodd for an ultra sound guided injection  She has a small stoke last month and is concerned  She is asking if getting this type of injections would be harmful in anyway

## 2021-09-02 NOTE — TELEPHONE ENCOUNTER
She can get the injection  She should avoid NSAID's like meloxicam, ibuprofen (Advil, Motrin), naproxen (Aleve) if she can

## 2021-09-03 ENCOUNTER — CONSULT (OUTPATIENT)
Dept: OBGYN CLINIC | Facility: CLINIC | Age: 86
End: 2021-09-03
Payer: MEDICARE

## 2021-09-03 VITALS — BODY MASS INDEX: 20.55 KG/M2 | WEIGHT: 116 LBS | HEIGHT: 63 IN

## 2021-09-03 DIAGNOSIS — M16.11 PRIMARY OSTEOARTHRITIS OF ONE HIP, RIGHT: Primary | ICD-10-CM

## 2021-09-03 PROCEDURE — 20611 DRAIN/INJ JOINT/BURSA W/US: CPT | Performed by: PHYSICAL MEDICINE & REHABILITATION

## 2021-09-03 PROCEDURE — 99213 OFFICE O/P EST LOW 20 MIN: CPT | Performed by: PHYSICAL MEDICINE & REHABILITATION

## 2021-09-03 RX ORDER — TRIAMCINOLONE ACETONIDE 40 MG/ML
40 INJECTION, SUSPENSION INTRA-ARTICULAR; INTRAMUSCULAR
Status: COMPLETED | OUTPATIENT
Start: 2021-09-03 | End: 2021-09-03

## 2021-09-03 RX ORDER — LIDOCAINE HYDROCHLORIDE 10 MG/ML
3 INJECTION, SOLUTION INFILTRATION; PERINEURAL
Status: COMPLETED | OUTPATIENT
Start: 2021-09-03 | End: 2021-09-03

## 2021-09-03 RX ADMIN — TRIAMCINOLONE ACETONIDE 40 MG: 40 INJECTION, SUSPENSION INTRA-ARTICULAR; INTRAMUSCULAR at 11:38

## 2021-09-03 RX ADMIN — LIDOCAINE HYDROCHLORIDE 3 ML: 10 INJECTION, SOLUTION INFILTRATION; PERINEURAL at 11:38

## 2021-09-03 NOTE — PROGRESS NOTES
1  Primary osteoarthritis of one hip, right  Ambulatory referral to Sports Medicine    Large joint arthrocentesis: R hip joint     Orders Placed This Encounter   Procedures    Large joint arthrocentesis: R hip joint        Impression: This is a pleasant patient referred by Chaya Chairez with right hip osteoarthritis  The patient is a good candidate for USGI of right hip joint  Please see procedure note below  Patient tolerated the procedure and had immediate relief of symptoms  Can consider repeat injection in 3 months if patient does well  Imaging Studies (I personally reviewed images in PACS and report):  Right hip x-rays most recent to this encounter reviewed  These images show severe osteoarthritic changes without any acute osseous abnormalities  Return if symptoms worsen or fail to improve  Patient is in agreement with the above plan  HPI:  Balbina Snell is a 81 y o  female  who presents for evaluation of   Chief Complaint   Patient presents with    Right Hip - Pain       Onset/Mechanism:  Chronic pain  Location:  Anterior hip/groin and into the posterior hip/back  Radiation:  Denies  Provocative:  Pivoting  Severity:  Hurts  Associated Symptoms:  Denies  Treatment so far:  Has had previous injections  Has also tried Tylenol and physical therapy without relief  Review of Systems   Constitutional: Positive for activity change  Negative for fever  HENT: Negative for sore throat  Eyes: Negative for visual disturbance  Respiratory: Negative for shortness of breath  Cardiovascular: Negative for chest pain  Gastrointestinal: Negative for abdominal pain  Endocrine: Negative for polydipsia  Genitourinary: Negative for difficulty urinating  Musculoskeletal: Positive for arthralgias  Skin: Negative for rash  Allergic/Immunologic: Negative for immunocompromised state  Neurological: Negative for numbness  Hematological: Does not bruise/bleed easily  Psychiatric/Behavioral: Negative for confusion  Following history reviewed and updated:  Past Medical History:   Diagnosis Date    Allergic rhinitis     Anxiety     Diverticulitis of large intestine without perforation or abscess without bleeding     Dysthymic disorder     Gastro-esophageal reflux disease without esophagitis     HLD (hyperlipidemia)     Hypertension     Osteopenia     Palpitations     Paresthesia      Past Surgical History:   Procedure Laterality Date    APPENDECTOMY      CHOLECYSTECTOMY      COLON SURGERY      FL INJECTION RIGHT HIP (NON ARTHROGRAM)  8/28/2019    HYSTERECTOMY       Social History   Social History     Substance and Sexual Activity   Alcohol Use Not Currently     Social History     Substance and Sexual Activity   Drug Use Not Currently     Social History     Tobacco Use   Smoking Status Former Smoker   Smokeless Tobacco Never Used     Family History   Problem Relation Age of Onset    Hypertension Mother     Hypertension Father      Allergies   Allergen Reactions    Penicillin G Benzathine         Constitutional:  Ht 5' 3" (1 6 m)   Wt 52 6 kg (116 lb)   BMI 20 55 kg/m²    General: NAD  Eyes: Anicteric sclerae  Neck: Supple  Lungs: Unlabored breathing  Cardiovascular: No lower extremity edema  Skin: Intact without erythema  Neurologic: Sensation intact to light touch  Psychiatric: Mood and affect are appropriate  Right Hip Exam     Tenderness   The patient is experiencing tenderness in the anterior  Other   Erythema: absent  Scars: absent  Sensation: normal  Pulse: present    Comments:  Positive modified Stinchfield test   Injection site was CDI  Large joint arthrocentesis: R hip joint  Universal Protocol:  Procedure performed by:  Consent: Verbal consent obtained  Written consent not obtained    Consent given by: patient  Timeout called at: 9/3/2021 11:36 AM   Patient understanding: patient states understanding of the procedure being performed  Site marked: the operative site was marked  Supporting Documentation  Indications: pain and diagnostic evaluation   Procedure Details  Location: hip - R hip joint  Ultrasound guidance: yes (US guidance was used to find the area of interest )  Approach: anterolateral  Medications administered: 40 mg triamcinolone acetonide 40 mg/mL; 3 mL lidocaine 1 %    Patient tolerance: patient tolerated the procedure well with no immediate complications  Dressing:  Sterile dressing applied    The right hip joint was visualized with ultrasound and injected with steroid/anesthetic solution as indicated  Prior to the injection, the ultrasound was used to evaluate for any neural or vascular structures  Care was taken to avoid these structures  The images (and video if taken) were saved to the [employer] ultrasound system  Prior to the procedure, the patient was informed of the following risks in layman terms:    - Risk of bleeding since a needle is involved  - Risk of infection (1/10,000 chance as per recent studies)  Signs/symptoms were discussed and they would prompt an urgent evaluation at an emergency department   - Risk of pigmentation or skin dimpling in the skin (2-3% chance as per recent studies) from the steroid  - Risk of increased pain from steroid flare (1% chance as per recent studies) that typically lasts 24-48 hours  - Risk of increased blood sugars from the steroid medication that can last for a few weeks  If the patient is a diabetic or pre-diabetic, they were encouraged to closely monitor their blood sugars and discuss with PCP if elevated more than usual or if having symptoms  After going over these risks, we decided that the benefits outweigh the risks and proceeded with the procedure

## 2021-09-07 RX ORDER — ATORVASTATIN CALCIUM 40 MG/1
TABLET, FILM COATED ORAL
Qty: 30 TABLET | Refills: 0 | Status: SHIPPED | OUTPATIENT
Start: 2021-09-07

## 2021-09-23 DIAGNOSIS — F41.0 ANXIETY ATTACK: ICD-10-CM

## 2021-09-23 RX ORDER — LORAZEPAM 0.5 MG/1
0.5 TABLET ORAL 2 TIMES DAILY PRN
Qty: 60 TABLET | Refills: 0 | Status: SHIPPED | OUTPATIENT
Start: 2021-09-23 | End: 2021-11-17 | Stop reason: SDUPTHER

## 2021-10-30 DIAGNOSIS — K21.9 GASTROESOPHAGEAL REFLUX DISEASE WITHOUT ESOPHAGITIS: ICD-10-CM

## 2021-10-30 DIAGNOSIS — I10 ESSENTIAL HYPERTENSION: ICD-10-CM

## 2021-11-09 DIAGNOSIS — I10 ESSENTIAL HYPERTENSION: ICD-10-CM

## 2021-11-09 DIAGNOSIS — K21.9 GASTROESOPHAGEAL REFLUX DISEASE WITHOUT ESOPHAGITIS: ICD-10-CM

## 2021-11-09 RX ORDER — OMEPRAZOLE 20 MG/1
20 CAPSULE, DELAYED RELEASE ORAL DAILY
Qty: 90 CAPSULE | Refills: 0 | Status: SHIPPED | OUTPATIENT
Start: 2021-11-09 | End: 2021-11-15 | Stop reason: SDUPTHER

## 2021-11-09 RX ORDER — OMEPRAZOLE 20 MG/1
20 CAPSULE, DELAYED RELEASE ORAL DAILY
Qty: 90 CAPSULE | Refills: 0 | Status: SHIPPED | OUTPATIENT
Start: 2021-11-09 | End: 2021-11-09 | Stop reason: SDUPTHER

## 2021-11-10 ENCOUNTER — OFFICE VISIT (OUTPATIENT)
Dept: INTERNAL MEDICINE CLINIC | Facility: CLINIC | Age: 86
End: 2021-11-10
Payer: MEDICARE

## 2021-11-10 VITALS
HEIGHT: 63 IN | DIASTOLIC BLOOD PRESSURE: 84 MMHG | BODY MASS INDEX: 19.45 KG/M2 | WEIGHT: 109.8 LBS | OXYGEN SATURATION: 99 % | HEART RATE: 83 BPM | SYSTOLIC BLOOD PRESSURE: 148 MMHG | TEMPERATURE: 98.9 F

## 2021-11-10 DIAGNOSIS — Z12.11 SCREENING FOR COLON CANCER: ICD-10-CM

## 2021-11-10 DIAGNOSIS — K21.9 GASTROESOPHAGEAL REFLUX DISEASE WITHOUT ESOPHAGITIS: ICD-10-CM

## 2021-11-10 DIAGNOSIS — K57.32 DIVERTICULITIS LARGE INTESTINE W/O PERFORATION OR ABSCESS W/O BLEEDING: Primary | ICD-10-CM

## 2021-11-10 DIAGNOSIS — I63.9 SMALL VESSEL STROKE (HCC): ICD-10-CM

## 2021-11-10 DIAGNOSIS — I10 ESSENTIAL HYPERTENSION: ICD-10-CM

## 2021-11-10 PROCEDURE — 99214 OFFICE O/P EST MOD 30 MIN: CPT | Performed by: INTERNAL MEDICINE

## 2021-11-10 RX ORDER — AMOXICILLIN AND CLAVULANATE POTASSIUM 875; 125 MG/1; MG/1
1 TABLET, FILM COATED ORAL EVERY 12 HOURS SCHEDULED
Qty: 14 TABLET | Refills: 0 | Status: SHIPPED | OUTPATIENT
Start: 2021-11-10 | End: 2021-11-17

## 2021-11-15 RX ORDER — OMEPRAZOLE 20 MG/1
CAPSULE, DELAYED RELEASE ORAL
Qty: 90 CAPSULE | Refills: 0 | Status: SHIPPED | OUTPATIENT
Start: 2021-11-15 | End: 2022-02-03 | Stop reason: SDUPTHER

## 2021-11-17 DIAGNOSIS — F41.0 ANXIETY ATTACK: ICD-10-CM

## 2021-11-17 RX ORDER — LORAZEPAM 0.5 MG/1
0.5 TABLET ORAL 2 TIMES DAILY PRN
Qty: 60 TABLET | Refills: 0 | Status: SHIPPED | OUTPATIENT
Start: 2021-11-17 | End: 2021-12-28 | Stop reason: SDUPTHER

## 2021-12-03 ENCOUNTER — CLINICAL SUPPORT (OUTPATIENT)
Dept: INTERNAL MEDICINE CLINIC | Facility: CLINIC | Age: 86
End: 2021-12-03
Payer: MEDICARE

## 2021-12-03 DIAGNOSIS — Z23 FLU VACCINE NEED: Primary | ICD-10-CM

## 2021-12-03 PROCEDURE — G0008 ADMIN INFLUENZA VIRUS VAC: HCPCS

## 2021-12-03 PROCEDURE — 90662 IIV NO PRSV INCREASED AG IM: CPT

## 2021-12-06 ENCOUNTER — OFFICE VISIT (OUTPATIENT)
Dept: INTERNAL MEDICINE CLINIC | Facility: CLINIC | Age: 86
End: 2021-12-06
Payer: MEDICARE

## 2021-12-06 VITALS
HEART RATE: 100 BPM | OXYGEN SATURATION: 99 % | DIASTOLIC BLOOD PRESSURE: 88 MMHG | WEIGHT: 107 LBS | TEMPERATURE: 98 F | SYSTOLIC BLOOD PRESSURE: 148 MMHG | BODY MASS INDEX: 18.96 KG/M2 | HEIGHT: 63 IN

## 2021-12-06 DIAGNOSIS — K21.9 GASTROESOPHAGEAL REFLUX DISEASE, UNSPECIFIED WHETHER ESOPHAGITIS PRESENT: ICD-10-CM

## 2021-12-06 DIAGNOSIS — E44.1 MILD PROTEIN-CALORIE MALNUTRITION (HCC): ICD-10-CM

## 2021-12-06 DIAGNOSIS — K57.32 DIVERTICULITIS LARGE INTESTINE W/O PERFORATION OR ABSCESS W/O BLEEDING: Primary | ICD-10-CM

## 2021-12-06 DIAGNOSIS — K59.04 CHRONIC IDIOPATHIC CONSTIPATION: ICD-10-CM

## 2021-12-06 DIAGNOSIS — I10 ESSENTIAL HYPERTENSION: ICD-10-CM

## 2021-12-06 PROCEDURE — 99213 OFFICE O/P EST LOW 20 MIN: CPT | Performed by: INTERNAL MEDICINE

## 2021-12-06 RX ORDER — METRONIDAZOLE 500 MG/1
500 TABLET ORAL EVERY 12 HOURS SCHEDULED
Qty: 10 TABLET | Refills: 0 | Status: SHIPPED | OUTPATIENT
Start: 2021-12-06 | End: 2021-12-11

## 2021-12-06 RX ORDER — DOXYCYCLINE HYCLATE 100 MG/1
100 CAPSULE ORAL EVERY 12 HOURS SCHEDULED
Qty: 10 CAPSULE | Refills: 0 | Status: SHIPPED | OUTPATIENT
Start: 2021-12-06 | End: 2021-12-11

## 2021-12-28 DIAGNOSIS — F41.0 ANXIETY ATTACK: ICD-10-CM

## 2021-12-28 RX ORDER — LORAZEPAM 0.5 MG/1
0.5 TABLET ORAL 2 TIMES DAILY PRN
Qty: 60 TABLET | Refills: 0 | Status: SHIPPED | OUTPATIENT
Start: 2021-12-28 | End: 2022-02-14 | Stop reason: SDUPTHER

## 2022-02-03 DIAGNOSIS — K21.9 GASTROESOPHAGEAL REFLUX DISEASE WITHOUT ESOPHAGITIS: ICD-10-CM

## 2022-02-03 DIAGNOSIS — I10 ESSENTIAL HYPERTENSION: ICD-10-CM

## 2022-02-03 RX ORDER — AMLODIPINE BESYLATE 5 MG/1
5 TABLET ORAL DAILY
Qty: 90 TABLET | Refills: 2 | Status: SHIPPED | OUTPATIENT
Start: 2022-02-03 | End: 2022-06-09

## 2022-02-03 RX ORDER — OMEPRAZOLE 20 MG/1
20 CAPSULE, DELAYED RELEASE ORAL DAILY
Qty: 90 CAPSULE | Refills: 0 | Status: SHIPPED | OUTPATIENT
Start: 2022-02-03 | End: 2022-07-19 | Stop reason: SDUPTHER

## 2022-02-10 ENCOUNTER — TELEPHONE (OUTPATIENT)
Dept: INTERNAL MEDICINE CLINIC | Facility: CLINIC | Age: 87
End: 2022-02-10

## 2022-02-10 DIAGNOSIS — K57.92 DIVERTICULITIS: Primary | ICD-10-CM

## 2022-02-10 RX ORDER — AMOXICILLIN AND CLAVULANATE POTASSIUM 562.5; 437.5; 62.5 MG/1; MG/1; MG/1
1 TABLET, FILM COATED, EXTENDED RELEASE ORAL 2 TIMES DAILY
Qty: 14 TABLET | Refills: 0 | Status: SHIPPED | OUTPATIENT
Start: 2022-02-10 | End: 2022-02-10 | Stop reason: SDUPTHER

## 2022-02-10 RX ORDER — AMOXICILLIN AND CLAVULANATE POTASSIUM 500; 125 MG/1; MG/1
1 TABLET, FILM COATED ORAL EVERY 8 HOURS SCHEDULED
Qty: 21 TABLET | Refills: 0 | Status: SHIPPED | OUTPATIENT
Start: 2022-02-10 | End: 2022-02-17

## 2022-02-10 NOTE — TELEPHONE ENCOUNTER
Pharmacy Called stated for the dose on the Augmentin will be hard to get in any pharmacy until tomorrow or following day, pt daughter is currently waiting at the pharmacy  Can we change the dose? If so please send in new script

## 2022-02-10 NOTE — TELEPHONE ENCOUNTER
Patient is having a diverticulitis flare up on he left side  Patient's Daughter said you are familiar with her flare ups and wondering if you can order her something

## 2022-02-14 DIAGNOSIS — F41.0 ANXIETY ATTACK: ICD-10-CM

## 2022-02-14 RX ORDER — LORAZEPAM 0.5 MG/1
0.5 TABLET ORAL 2 TIMES DAILY PRN
Qty: 60 TABLET | Refills: 0 | Status: SHIPPED | OUTPATIENT
Start: 2022-02-14 | End: 2022-03-16 | Stop reason: SDUPTHER

## 2022-02-16 ENCOUNTER — APPOINTMENT (EMERGENCY)
Dept: RADIOLOGY | Facility: HOSPITAL | Age: 87
End: 2022-02-16
Payer: MEDICARE

## 2022-02-16 ENCOUNTER — HOSPITAL ENCOUNTER (EMERGENCY)
Facility: HOSPITAL | Age: 87
Discharge: HOME | End: 2022-02-16
Attending: EMERGENCY MEDICINE | Admitting: EMERGENCY MEDICINE
Payer: MEDICARE

## 2022-02-16 VITALS
WEIGHT: 99 LBS | SYSTOLIC BLOOD PRESSURE: 159 MMHG | HEIGHT: 63 IN | RESPIRATION RATE: 16 BRPM | BODY MASS INDEX: 17.54 KG/M2 | OXYGEN SATURATION: 97 % | HEART RATE: 85 BPM | DIASTOLIC BLOOD PRESSURE: 76 MMHG | TEMPERATURE: 98.3 F

## 2022-02-16 DIAGNOSIS — R11.0 NAUSEA: ICD-10-CM

## 2022-02-16 DIAGNOSIS — R19.7 DIARRHEA: ICD-10-CM

## 2022-02-16 DIAGNOSIS — K52.9 COLITIS: Primary | ICD-10-CM

## 2022-02-16 LAB
ALBUMIN SERPL BCP-MCNC: 3.6 G/DL (ref 3.5–5)
ALP SERPL-CCNC: 64 U/L (ref 46–116)
ALT SERPL W P-5'-P-CCNC: 17 U/L (ref 12–78)
ANION GAP SERPL CALCULATED.3IONS-SCNC: 6 MMOL/L (ref 4–13)
AST SERPL W P-5'-P-CCNC: 12 U/L (ref 5–45)
BACTERIA UR QL AUTO: NORMAL /HPF
BASOPHILS # BLD AUTO: 0.04 THOUSANDS/ΜL (ref 0–0.1)
BASOPHILS NFR BLD AUTO: 0 % (ref 0–1)
BILIRUB SERPL-MCNC: 0.61 MG/DL (ref 0.2–1)
BILIRUB UR QL STRIP: NEGATIVE
BUN SERPL-MCNC: 12 MG/DL (ref 5–25)
CALCIUM SERPL-MCNC: 9.6 MG/DL (ref 8.3–10.1)
CHLORIDE SERPL-SCNC: 105 MMOL/L (ref 100–108)
CLARITY UR: CLEAR
CO2 SERPL-SCNC: 26 MMOL/L (ref 21–32)
COLOR UR: YELLOW
CREAT SERPL-MCNC: 0.9 MG/DL (ref 0.6–1.3)
EOSINOPHIL # BLD AUTO: 0.06 THOUSAND/ΜL (ref 0–0.61)
EOSINOPHIL NFR BLD AUTO: 1 % (ref 0–6)
ERYTHROCYTE [DISTWIDTH] IN BLOOD BY AUTOMATED COUNT: 12.4 % (ref 11.6–15.1)
GFR SERPL CREATININE-BSD FRML MDRD: 58 ML/MIN/1.73SQ M
GLUCOSE SERPL-MCNC: 92 MG/DL (ref 65–140)
GLUCOSE UR STRIP-MCNC: NEGATIVE MG/DL
HCT VFR BLD AUTO: 39.9 % (ref 34.8–46.1)
HGB BLD-MCNC: 13.7 G/DL (ref 11.5–15.4)
HGB UR QL STRIP.AUTO: NEGATIVE
HYALINE CASTS #/AREA URNS LPF: NORMAL /LPF
IMM GRANULOCYTES # BLD AUTO: 0.03 THOUSAND/UL (ref 0–0.2)
IMM GRANULOCYTES NFR BLD AUTO: 0 % (ref 0–2)
KETONES UR STRIP-MCNC: NEGATIVE MG/DL
LEUKOCYTE ESTERASE UR QL STRIP: ABNORMAL
LYMPHOCYTES # BLD AUTO: 1.02 THOUSANDS/ΜL (ref 0.6–4.47)
LYMPHOCYTES NFR BLD AUTO: 10 % (ref 14–44)
MCH RBC QN AUTO: 32.6 PG (ref 26.8–34.3)
MCHC RBC AUTO-ENTMCNC: 34.3 G/DL (ref 31.4–37.4)
MCV RBC AUTO: 95 FL (ref 82–98)
MONOCYTES # BLD AUTO: 0.53 THOUSAND/ΜL (ref 0.17–1.22)
MONOCYTES NFR BLD AUTO: 5 % (ref 4–12)
NEUTROPHILS # BLD AUTO: 9 THOUSANDS/ΜL (ref 1.85–7.62)
NEUTS SEG NFR BLD AUTO: 84 % (ref 43–75)
NITRITE UR QL STRIP: NEGATIVE
NON-SQ EPI CELLS URNS QL MICRO: NORMAL /HPF
NRBC BLD AUTO-RTO: 0 /100 WBCS
PH UR STRIP.AUTO: 5.5 [PH] (ref 4.5–8)
PLATELET # BLD AUTO: 232 THOUSANDS/UL (ref 149–390)
PMV BLD AUTO: 10.3 FL (ref 8.9–12.7)
POTASSIUM SERPL-SCNC: 3.4 MMOL/L (ref 3.5–5.3)
PROT SERPL-MCNC: 7.3 G/DL (ref 6.4–8.2)
PROT UR STRIP-MCNC: NEGATIVE MG/DL
RBC # BLD AUTO: 4.2 MILLION/UL (ref 3.81–5.12)
RBC #/AREA URNS AUTO: NORMAL /HPF
SODIUM SERPL-SCNC: 137 MMOL/L (ref 136–145)
SP GR UR STRIP.AUTO: <=1.005 (ref 1–1.03)
UROBILINOGEN UR QL STRIP.AUTO: 0.2 E.U./DL
WBC # BLD AUTO: 10.68 THOUSAND/UL (ref 4.31–10.16)
WBC #/AREA URNS AUTO: NORMAL /HPF

## 2022-02-16 PROCEDURE — 96365 THER/PROPH/DIAG IV INF INIT: CPT

## 2022-02-16 PROCEDURE — 80053 COMPREHEN METABOLIC PANEL: CPT | Performed by: EMERGENCY MEDICINE

## 2022-02-16 PROCEDURE — 36415 COLL VENOUS BLD VENIPUNCTURE: CPT | Performed by: EMERGENCY MEDICINE

## 2022-02-16 PROCEDURE — 99284 EMERGENCY DEPT VISIT MOD MDM: CPT

## 2022-02-16 PROCEDURE — 99285 EMERGENCY DEPT VISIT HI MDM: CPT | Performed by: EMERGENCY MEDICINE

## 2022-02-16 PROCEDURE — G1004 CDSM NDSC: HCPCS

## 2022-02-16 PROCEDURE — 85025 COMPLETE CBC W/AUTO DIFF WBC: CPT | Performed by: EMERGENCY MEDICINE

## 2022-02-16 PROCEDURE — 96375 TX/PRO/DX INJ NEW DRUG ADDON: CPT

## 2022-02-16 PROCEDURE — 74177 CT ABD & PELVIS W/CONTRAST: CPT

## 2022-02-16 PROCEDURE — 81001 URINALYSIS AUTO W/SCOPE: CPT

## 2022-02-16 RX ORDER — ONDANSETRON 4 MG/1
4 TABLET, ORALLY DISINTEGRATING ORAL EVERY 6 HOURS PRN
Qty: 20 TABLET | Refills: 0 | Status: SHIPPED | OUTPATIENT
Start: 2022-02-16

## 2022-02-16 RX ORDER — MOXIFLOXACIN HYDROCHLORIDE 400 MG/1
400 TABLET ORAL DAILY
Qty: 5 TABLET | Refills: 0 | Status: SHIPPED | OUTPATIENT
Start: 2022-02-16 | End: 2022-02-21

## 2022-02-16 RX ORDER — KETOROLAC TROMETHAMINE 30 MG/ML
15 INJECTION, SOLUTION INTRAMUSCULAR; INTRAVENOUS ONCE
Status: COMPLETED | OUTPATIENT
Start: 2022-02-16 | End: 2022-02-16

## 2022-02-16 RX ORDER — SODIUM CHLORIDE, SODIUM GLUCONATE, SODIUM ACETATE, POTASSIUM CHLORIDE, MAGNESIUM CHLORIDE, SODIUM PHOSPHATE, DIBASIC, AND POTASSIUM PHOSPHATE .53; .5; .37; .037; .03; .012; .00082 G/100ML; G/100ML; G/100ML; G/100ML; G/100ML; G/100ML; G/100ML
500 INJECTION, SOLUTION INTRAVENOUS ONCE
Status: COMPLETED | OUTPATIENT
Start: 2022-02-16 | End: 2022-02-16

## 2022-02-16 RX ORDER — ONDANSETRON 2 MG/ML
4 INJECTION INTRAMUSCULAR; INTRAVENOUS ONCE
Status: COMPLETED | OUTPATIENT
Start: 2022-02-16 | End: 2022-02-16

## 2022-02-16 RX ADMIN — KETOROLAC TROMETHAMINE 15 MG: 30 INJECTION, SOLUTION INTRAMUSCULAR at 12:35

## 2022-02-16 RX ADMIN — IOHEXOL 100 ML: 350 INJECTION, SOLUTION INTRAVENOUS at 11:19

## 2022-02-16 RX ADMIN — ONDANSETRON 4 MG: 2 INJECTION INTRAMUSCULAR; INTRAVENOUS at 10:43

## 2022-02-16 RX ADMIN — SODIUM CHLORIDE, SODIUM GLUCONATE, SODIUM ACETATE, POTASSIUM CHLORIDE, MAGNESIUM CHLORIDE, SODIUM PHOSPHATE, DIBASIC, AND POTASSIUM PHOSPHATE 500 ML: .53; .5; .37; .037; .03; .012; .00082 INJECTION, SOLUTION INTRAVENOUS at 10:41

## 2022-02-16 NOTE — ED PROVIDER NOTES
History  Chief Complaint   Patient presents with    Diarrhea     Patient reports that she has been having loose stools since yesterday morning and hasn't been eating or drinking much since yesterday morning; started on ABX last week for diverticulitis      Pt is an 80yo F who presents for diarrhea  Patient states she has been having diarrhea for approximately 9 days  Patient called her PCP stating that it sounded like her previous diverticulitis and was prescribed Augmentin  Per family, patient took Augmentin for 2 days and then stopped as she was not tolerating it  Family states that patient was complaining of nausea with the Augmentin  Patient has not been on antibiotics for approximately 1 week  Patient does state history of diverticulitis and states this feels similar  Patient does also report decreased p o  Intake during this time  Patient's diarrhea has been nonbloody  Patient states she takes her medications regularly aside from the prescribed antibiotic there have been no recent changes  Prior to Admission Medications   Prescriptions Last Dose Informant Patient Reported? Taking?    LORazepam (ATIVAN) 0 5 mg tablet   No No   Sig: Take 1 tablet (0 5 mg total) by mouth 2 (two) times a day as needed for anxiety   amLODIPine (NORVASC) 5 mg tablet   No No   Sig: Take 1 tablet (5 mg total) by mouth daily   amoxicillin-clavulanate (AUGMENTIN) 500-125 mg per tablet   No No   Sig: Take 1 tablet by mouth every 8 (eight) hours for 7 days   aspirin 81 mg chewable tablet  Self No No   Sig: Chew 1 tablet (81 mg total) daily   atorvastatin (LIPITOR) 40 mg tablet  Self No No   Sig: TAKE ONE TABLET BY MOUTH ONE TIME DAILY IN THE EVENING    latanoprost (XALATAN) 0 005 % ophthalmic solution  Self Yes No   meloxicam (MOBIC) 7 5 mg tablet  Self No No   Sig: Take 1 tablet (7 5 mg total) by mouth daily   metoprolol tartrate (LOPRESSOR) 25 mg tablet   No No   Sig: Take 1 tablet (25 mg total) by mouth 2 (two) times a day   omeprazole (PriLOSEC) 20 mg delayed release capsule   No No   Sig: Take 1 capsule (20 mg total) by mouth daily      Facility-Administered Medications: None       Past Medical History:   Diagnosis Date    Allergic rhinitis     Anxiety     Diverticulitis of large intestine without perforation or abscess without bleeding     Dysthymic disorder     Gastro-esophageal reflux disease without esophagitis     HLD (hyperlipidemia)     Hypertension     Osteopenia     Palpitations     Paresthesia     Stroke [facility])        Past Surgical History:   Procedure Laterality Date    APPENDECTOMY      CHOLECYSTECTOMY      COLON SURGERY      FL INJECTION RIGHT HIP (NON ARTHROGRAM)  8/28/2019    HYSTERECTOMY         Family History   Problem Relation Age of Onset    Hypertension Mother     Hypertension Father      I have reviewed and agree with the history as documented  E-Cigarette/Vaping    E-Cigarette Use Never User      E-Cigarette/Vaping Substances    Nicotine No     THC No     CBD No     Flavoring No     Other No     Unknown No      Social History     Tobacco Use    Smoking status: Former Smoker    Smokeless tobacco: Never Used   Vaping Use    Vaping Use: Never used   Substance Use Topics    Alcohol use: Not Currently    Drug use: Not Currently        Review of Systems   Constitutional: Positive for activity change (decreased, per daughter) and appetite change (decreased)  Negative for chills and fever  HENT: Negative for trouble swallowing  Eyes: Negative for visual disturbance  Respiratory: Negative for cough and wheezing  Cardiovascular: Negative for chest pain and palpitations  Gastrointestinal: Positive for abdominal pain, diarrhea, nausea and vomiting  Negative for abdominal distention, blood in stool and constipation  Genitourinary: Negative for difficulty urinating, dysuria, flank pain, frequency and hematuria     Musculoskeletal: Negative for arthralgias, back pain and neck pain    Skin: Negative for color change and pallor  Neurological: Negative for dizziness, syncope, weakness, light-headedness and headaches  Psychiatric/Behavioral: Positive for confusion (baseline; slightly increased per daughter)  Negative for agitation and behavioral problems  Physical Exam  ED Triage Vitals   Temperature Pulse Respirations Blood Pressure SpO2   02/16/22 1001 02/16/22 1003 02/16/22 1003 02/16/22 1003 02/16/22 1003   98 3 °F (36 8 °C) 85 16 159/76 97 %      Temp Source Heart Rate Source Patient Position - Orthostatic VS BP Location FiO2 (%)   02/16/22 1001 02/16/22 1003 02/16/22 1003 02/16/22 1003 --   Oral Monitor Sitting Right arm       Pain Score       02/16/22 1001       3             Orthostatic Vital Signs  Vitals:    02/16/22 1003   BP: 159/76   Pulse: 85   Patient Position - Orthostatic VS: Sitting       Physical Exam  Vitals reviewed  Constitutional:       General: She is not in acute distress  Appearance: She is well-developed  She is not toxic-appearing or diaphoretic  HENT:      Head: Normocephalic and atraumatic  Right Ear: External ear normal       Left Ear: External ear normal       Nose: Nose normal       Mouth/Throat:      Mouth: Mucous membranes are moist       Pharynx: Oropharynx is clear  Eyes:      Extraocular Movements: Extraocular movements intact  Pupils: Pupils are equal, round, and reactive to light  Cardiovascular:      Rate and Rhythm: Normal rate and regular rhythm  Heart sounds: Normal heart sounds  Pulmonary:      Effort: Pulmonary effort is normal  No respiratory distress  Breath sounds: Normal breath sounds  No wheezing  Abdominal:      General: There is no distension  Palpations: Abdomen is soft  There is no mass  Tenderness: There is abdominal tenderness (mild; diffuse)  There is no right CVA tenderness, left CVA tenderness, guarding or rebound  Musculoskeletal:         General: Normal range of motion  Cervical back: Normal range of motion and neck supple  No tenderness  Right lower leg: No edema  Left lower leg: No edema  Skin:     General: Skin is warm and dry  Capillary Refill: Capillary refill takes less than 2 seconds  Coloration: Skin is not pale  Findings: No erythema or rash  Neurological:      General: No focal deficit present  Mental Status: She is alert  Comments: A&Ox3 but intermittently confused when answering questions   Psychiatric:         Speech: Speech normal          Behavior: Behavior is cooperative           ED Medications  Medications   ondansetron (ZOFRAN) injection 4 mg (4 mg Intravenous Given 2/16/22 1043)   multi-electrolyte (ISOLYTE-S PH 7 4) bolus 500 mL (0 mL Intravenous Stopped 2/16/22 1130)   iohexol (OMNIPAQUE) 350 MG/ML injection (SINGLE-DOSE) 100 mL (100 mL Intravenous Given 2/16/22 1119)   ketorolac (TORADOL) injection 15 mg (15 mg Intravenous Given 2/16/22 1235)       Diagnostic Studies  Results Reviewed     Procedure Component Value Units Date/Time    Urine Microscopic [[id number]]  (Normal) Collected: 02/16/22 1142    Lab Status: Final result Specimen: Urine, Clean Catch Updated: 02/16/22 1205     RBC, UA None Seen /hpf      WBC, UA None Seen /hpf      Epithelial Cells None Seen /hpf      Bacteria, UA None Seen /hpf      Hyaline Casts, UA None Seen /lpf     Urine Macroscopic, POC [[id number]]  (Abnormal) Collected: 02/16/22 1142    Lab Status: Final result Specimen: Urine Updated: 02/16/22 1143     Color, UA Yellow     Clarity, UA Clear     pH, UA 5 5     Leukocytes, UA Trace     Nitrite, UA Negative     Protein, UA Negative mg/dl      Glucose, UA Negative mg/dl      Ketones, UA Negative mg/dl      Urobilinogen, UA 0 2 E U /dl      Bilirubin, UA Negative     Blood, UA Negative     Specific Gravity, UA <=1 005    Narrative:      CLINITEK RESULT    Comprehensive metabolic panel [[id number]]  (Abnormal) Collected: 02/16/22 1027    Lab Status: Final result Specimen: Blood from Arm, Left Updated: 02/16/22 1102     Sodium 137 mmol/L      Potassium 3 4 mmol/L      Chloride 105 mmol/L      CO2 26 mmol/L      ANION GAP 6 mmol/L      BUN 12 mg/dL      Creatinine 0 90 mg/dL      Glucose 92 mg/dL      Calcium 9 6 mg/dL      AST 12 U/L      ALT 17 U/L      Alkaline Phosphatase 64 U/L      Total Protein 7 3 g/dL      Albumin 3 6 g/dL      Total Bilirubin 0 61 mg/dL      eGFR 58 ml/min/1 73sq m     Narrative:      [place] guidelines for Chronic Kidney Disease (CKD):     Stage 1 with normal or high GFR (GFR > 90 mL/min/1 73 square meters)    Stage 2 Mild CKD (GFR = 60-89 mL/min/1 73 square meters)    Stage 3A Moderate CKD (GFR = 45-59 mL/min/1 73 square meters)    Stage 3B Moderate CKD (GFR = 30-44 mL/min/1 73 square meters)    Stage 4 Severe CKD (GFR = 15-29 mL/min/1 73 square meters)    Stage 5 End Stage CKD (GFR <15 mL/min/1 73 square meters)  Note: GFR calculation is accurate only with a steady state creatinine    CBC and differential [[id number]]  (Abnormal) Collected: 02/16/22 1027    Lab Status: Final result Specimen: Blood from Arm, Left Updated: 02/16/22 1037     WBC 10 68 Thousand/uL      RBC 4 20 Million/uL      Hemoglobin 13 7 g/dL      Hematocrit 39 9 %      MCV 95 fL      MCH 32 6 pg      MCHC 34 3 g/dL      RDW 12 4 %      MPV 10 3 fL      Platelets 197 Thousands/uL      nRBC 0 /100 WBCs      Neutrophils Relative 84 %      Immat GRANS % 0 %      Lymphocytes Relative 10 %      Monocytes Relative 5 %      Eosinophils Relative 1 %      Basophils Relative 0 %      Neutrophils Absolute 9 00 Thousands/µL      Immature Grans Absolute 0 03 Thousand/uL      Lymphocytes Absolute 1 02 Thousands/µL      Monocytes Absolute 0 53 Thousand/µL      Eosinophils Absolute 0 06 Thousand/µL      Basophils Absolute 0 04 Thousands/µL     Clostridium difficile toxin by PCR with EIA [[id number]]     Lab Status: No result Specimen: Stool from Per Rectum                  CT abdomen pelvis with contrast   Final Result by Margo Kerns DO (02/16 1200)      1  Question mild pancolonic thickening which may reflect underdistention versus mild infectious or inflammatory colitis  Colonic diverticulosis without evidence of focal diverticulitis  *2  Interval enlargement of indeterminate, possibly solid right upper pole renal lesion  Renal cancer cannot be excluded  Follow-up nonemergent MRI abdomen with IV contrast recommended  3  Mild bibasilar centrilobular nodularity likely representing infectious or inflammatory bronchiolitis and/or aspiration  This appears chronic on the right in retrospect  The study was marked in Epic for follow-up  I personally discussed this study with Dr Celeste Riley in the ER on 2/16/2022 at 11:59 AM                 Workstation performed: [id number]               Procedures  Procedures      ED Course  ED Course as of 02/21/22 1507   Wed Feb 16, 2022   1038 WBC(!): 10 68  Slight leukocytosis; Non-diagnostic; Awaiting further labs   1108 Comprehensive metabolic panel(!)  Reviewed and without actionable derangement  1155 Leukocytes, UA(!): Trace  Trace leuks without with nitrites  1218 Urine Microscopic  Negative  1220 CT abdomen pelvis with contrast  Colitis  Will treat with moxifloxacin  Incidental renal mass  Pt aware  1242 PO challenge successful  1312 Pt states she is feeling much better  Awaiting daughter return to discuss plan for abx, incidental findings, and DC  Identification of Seniors at Risk      Most Recent Value   (ISAR) Identification of Seniors at Risk    Before the illness or injury that brought you to the Emergency, did you need someone to help you on a regular basis?  1 Filed at: 02/16/2022 1006   In the last 24 hours, have you needed more help than usual? 0 Filed at: 02/16/2022 1006   Have you been hospitalized for one or more nights during the past 6 months? 0 Filed at: 02/16/2022 1006   In general, do you see well? 0 Filed at: 02/16/2022 1006   In general, do you have serious problems with your memory? 0 Filed at: 02/16/2022 1006   Do you take more than three different medications every day? 1 Filed at: 02/16/2022 1006   ISAR Score 2 Filed at: 02/16/2022 1006                              MDM  Number of Diagnoses or Management Options  Colitis  Diarrhea  Nausea  Diagnosis management comments: Pt is a 82yo F who presents with diarrhea and abdominal pain  Exam pertinent for mild abdominal tenderness  Differential diagnosis to include but not limited to diverticulitis, colitis, gastroenteritis, medication intolerance  Will plan for basic labs as well as UA and CT abdomen pelvis  See ED course for pertinent findings  Patient had have colitis and after discussion with pharmacy, will plan for outpatient moxifloxacin  Daughter is concerned that patient has had decreased p o  Intake at home  Discussed that we will plan to discharge with Zofran and p o  Challenge prior to discharge  Patient stating that she feels better and has tolerated p o  Patient also had incidental finding that was discussed with patient and daughters  Plan to discharge patient with follow-up to PCP  Discussed returning the ED with significant worsening of symptoms  Discussed use of over the counter medications as stated on the bottle as needed for pain  Discussed taking new medication as prescribed and to completion  Pt expressed understanding of discharge instructions, return precautions, and medication instructions  All questions were answered and pt was discharged without incident              Amount and/or Complexity of Data Reviewed  Clinical lab tests: ordered and reviewed  Tests in the radiology section of CPT®: ordered and reviewed  Discussion of test results with the performing providers: yes        Disposition  Final diagnoses:   Colitis   Diarrhea   Nausea     Time reflects when diagnosis was documented in both MDM as applicable and the Disposition within this note     Time User Action Codes Description Comment    2/16/2022  1:29 PM Gaile Midget [K52 9] Colitis     2/16/2022  1:29 PM Jeet Given Add [R19 7] Diarrhea     2/16/2022  1:30 PM Fifield Given Add [R11 0] Nausea       ED Disposition     ED Disposition Condition Date/Time Comment    Discharge Stable Wed Feb 16, 2022  2:30  Epifanio Graham discharge to home/self care              Follow-up Information     Follow up With Specialties Details Why Contact Info    Surjit Sanon MD Internal Medicine Call  For follow-up [address] [facility]  [phone]            Discharge Medication List as of 2/16/2022  2:30 PM      START taking these medications    Details   moxifloxacin (AVELOX) 400 MG tablet Take 1 tablet (400 mg total) by mouth daily for 5 days, Starting Wed 2/16/2022, Until Mon 2/21/2022, Normal      ondansetron (Zofran ODT) 4 mg disintegrating tablet Take 1 tablet (4 mg total) by mouth every 6 (six) hours as needed for nausea or vomiting, Starting Wed 2/16/2022, Normal         CONTINUE these medications which have NOT CHANGED    Details   amLODIPine (NORVASC) 5 mg tablet Take 1 tablet (5 mg total) by mouth daily, Starting Thu 2/3/2022, Until Wed 5/4/2022, Normal      amoxicillin-clavulanate (AUGMENTIN) 500-125 mg per tablet Take 1 tablet by mouth every 8 (eight) hours for 7 days, Starting Thu 2/10/2022, Until Thu 2/17/2022, Normal      aspirin 81 mg chewable tablet Chew 1 tablet (81 mg total) daily, Starting Fri 7/30/2021, Normal      atorvastatin (LIPITOR) 40 mg tablet TAKE ONE TABLET BY MOUTH ONE TIME DAILY IN THE EVENING , Normal      latanoprost (XALATAN) 0 005 % ophthalmic solution Starting Mon 2/8/2021, Historical Med      LORazepam (ATIVAN) 0 5 mg tablet Take 1 tablet (0 5 mg total) by mouth 2 (two) times a day as needed for anxiety, Starting Mon 2/14/2022, Until Wed 3/16/2022 at 2359, Normal      meloxicam (MOBIC) 7 5 mg tablet Take 1 tablet (7 5 mg total) by mouth daily, Starting Tue 8/3/2021, Normal      metoprolol tartrate (LOPRESSOR) 25 mg tablet Take 1 tablet (25 mg total) by mouth 2 (two) times a day, Starting Thu 2/3/2022, Normal      omeprazole (PriLOSEC) 20 mg delayed release capsule Take 1 capsule (20 mg total) by mouth daily, Starting Thu 2/3/2022, Normal           No discharge procedures on file  PDMP Review       Value Time User    PDMP Reviewed  Yes 5/25/2021  2:10 PM Chavo Beckett MD           ED Provider  Attending physically available and evaluated Rubio Pacheconemesio SAUNDERS managed the patient along with the ED Attending      Electronically Signed by         Julito Mason MD  02/21/22 7365

## 2022-02-16 NOTE — ED ATTENDING ATTESTATION
2/16/2022  IJaleel MD, saw and evaluated the patient  I have discussed the patient with the resident/non-physician practitioner and agree with the resident's/non-physician practitioner's findings, Plan of Care, and MDM as documented in the resident's/non-physician practitioner's note, except where noted  All available labs and Radiology studies were reviewed  I was present for key portions of any procedure(s) performed by the resident/non-physician practitioner and I was immediately available to provide assistance  At this point I agree with the current assessment done in the Emergency Department    I have conducted an independent evaluation of this patient a history and physical is as follows:  C/o  Diarrhea and abd cramping     On augmentin  X 2 days  (But Pt  Stopped the abx)    For presumed dirverticulitis      No Abx for  4 days   Watery diarrhea  No blood   Nausea and vomiting   No vomit since yesterday    No fever     Had covid several months   Ago    Exam  nad    Anicteric   Lungs clear heart rrr no m abd soft pos bs  Mild lower abd tenderness with r/g      Impression abdominal cramping with diarrhea  IV fluids CT abdomen pelvis C diff    ED Course         Critical Care Time  Procedures

## 2022-02-16 NOTE — DISCHARGE INSTRUCTIONS
Follow-up with PCP for further care  Use over the counter medications as stated on the bottle as needed for pain and symptom control  Take your antibiotic as prescribed and to completion  Use Zofran as needed for continued nausea and vomiting  Stay hydrated  CT report provided with incidental findings and appropriate recommendations for follow-up  Return to the ED with new or worsening symptoms

## 2022-02-18 ENCOUNTER — HOSPITAL ENCOUNTER (EMERGENCY)
Facility: HOSPITAL | Age: 87
Discharge: HOME | End: 2022-02-18
Attending: EMERGENCY MEDICINE | Admitting: EMERGENCY MEDICINE
Payer: MEDICARE

## 2022-02-18 VITALS
HEART RATE: 76 BPM | DIASTOLIC BLOOD PRESSURE: 86 MMHG | OXYGEN SATURATION: 98 % | SYSTOLIC BLOOD PRESSURE: 172 MMHG | RESPIRATION RATE: 16 BRPM

## 2022-02-18 DIAGNOSIS — F41.9 ANXIETY: Primary | ICD-10-CM

## 2022-02-18 DIAGNOSIS — R06.02 SOB (SHORTNESS OF BREATH): ICD-10-CM

## 2022-02-18 DIAGNOSIS — E87.6 HYPOKALEMIA: ICD-10-CM

## 2022-02-18 LAB
2HR DELTA HS TROPONIN: -22 NG/L
ANION GAP SERPL CALCULATED.3IONS-SCNC: 8 MMOL/L (ref 4–13)
BASOPHILS # BLD AUTO: 0.04 THOUSANDS/ΜL (ref 0–0.1)
BASOPHILS NFR BLD AUTO: 0 % (ref 0–1)
BUN SERPL-MCNC: 11 MG/DL (ref 5–25)
CALCIUM SERPL-MCNC: 9.6 MG/DL (ref 8.3–10.1)
CARDIAC TROPONIN I PNL SERPL HS: 28 NG/L
CARDIAC TROPONIN I PNL SERPL HS: 6 NG/L
CHLORIDE SERPL-SCNC: 108 MMOL/L (ref 100–108)
CO2 SERPL-SCNC: 27 MMOL/L (ref 21–32)
CREAT SERPL-MCNC: 0.88 MG/DL (ref 0.6–1.3)
EOSINOPHIL # BLD AUTO: 0.13 THOUSAND/ΜL (ref 0–0.61)
EOSINOPHIL NFR BLD AUTO: 2 % (ref 0–6)
ERYTHROCYTE [DISTWIDTH] IN BLOOD BY AUTOMATED COUNT: 12.4 % (ref 11.6–15.1)
GFR SERPL CREATININE-BSD FRML MDRD: 59 ML/MIN/1.73SQ M
GLUCOSE SERPL-MCNC: 96 MG/DL (ref 65–140)
HCT VFR BLD AUTO: 41.1 % (ref 34.8–46.1)
HGB BLD-MCNC: 14 G/DL (ref 11.5–15.4)
IMM GRANULOCYTES # BLD AUTO: 0.04 THOUSAND/UL (ref 0–0.2)
IMM GRANULOCYTES NFR BLD AUTO: 0 % (ref 0–2)
LYMPHOCYTES # BLD AUTO: 0.87 THOUSANDS/ΜL (ref 0.6–4.47)
LYMPHOCYTES NFR BLD AUTO: 10 % (ref 14–44)
MCH RBC QN AUTO: 32.1 PG (ref 26.8–34.3)
MCHC RBC AUTO-ENTMCNC: 34.1 G/DL (ref 31.4–37.4)
MCV RBC AUTO: 94 FL (ref 82–98)
MONOCYTES # BLD AUTO: 0.36 THOUSAND/ΜL (ref 0.17–1.22)
MONOCYTES NFR BLD AUTO: 4 % (ref 4–12)
NEUTROPHILS # BLD AUTO: 7.5 THOUSANDS/ΜL (ref 1.85–7.62)
NEUTS SEG NFR BLD AUTO: 84 % (ref 43–75)
NRBC BLD AUTO-RTO: 0 /100 WBCS
PLATELET # BLD AUTO: 254 THOUSANDS/UL (ref 149–390)
PMV BLD AUTO: 10.2 FL (ref 8.9–12.7)
POTASSIUM SERPL-SCNC: 3.2 MMOL/L (ref 3.5–5.3)
RBC # BLD AUTO: 4.36 MILLION/UL (ref 3.81–5.12)
SODIUM SERPL-SCNC: 143 MMOL/L (ref 136–145)
WBC # BLD AUTO: 8.94 THOUSAND/UL (ref 4.31–10.16)

## 2022-02-18 PROCEDURE — 93005 ELECTROCARDIOGRAM TRACING: CPT

## 2022-02-18 PROCEDURE — 80048 BASIC METABOLIC PNL TOTAL CA: CPT | Performed by: EMERGENCY MEDICINE

## 2022-02-18 PROCEDURE — 99285 EMERGENCY DEPT VISIT HI MDM: CPT | Performed by: EMERGENCY MEDICINE

## 2022-02-18 PROCEDURE — 85025 COMPLETE CBC W/AUTO DIFF WBC: CPT | Performed by: EMERGENCY MEDICINE

## 2022-02-18 PROCEDURE — 99284 EMERGENCY DEPT VISIT MOD MDM: CPT

## 2022-02-18 PROCEDURE — 36415 COLL VENOUS BLD VENIPUNCTURE: CPT | Performed by: EMERGENCY MEDICINE

## 2022-02-18 PROCEDURE — 84484 ASSAY OF TROPONIN QUANT: CPT | Performed by: EMERGENCY MEDICINE

## 2022-02-18 RX ORDER — POTASSIUM CHLORIDE 20 MEQ/1
40 TABLET, EXTENDED RELEASE ORAL ONCE
Status: COMPLETED | OUTPATIENT
Start: 2022-02-18 | End: 2022-02-18

## 2022-02-18 RX ADMIN — POTASSIUM CHLORIDE 40 MEQ: 1500 TABLET, EXTENDED RELEASE ORAL at 16:54

## 2022-02-18 NOTE — ED PROVIDER NOTES
History  Chief Complaint   Patient presents with    Anxiety     Pt here a few days ago with a hemorrhoid issue  Today she started thinking about her problem and got a little anxious, ems was called, found to have high blood pressure  No other complaints at this time  73-year-old female history of hypertension on aspirin presenting with episode of shortness of breath attributed to anxiety  Patient reports that she was thinking about her hemorrhoids and was concerned about them and began to feel anxious and short of breath  Denies any associated chest pain  Reports improvement with anxiety medication  Asymptomatic at this time  Denies any other complaints  Denies any headache, vision changes, chest pain abdominal pain, nausea/vomiting, fever/chills, or any bladder or bowel changes  Prior to Admission Medications   Prescriptions Last Dose Informant Patient Reported? Taking?    LORazepam (ATIVAN) 0 5 mg tablet   No No   Sig: Take 1 tablet (0 5 mg total) by mouth 2 (two) times a day as needed for anxiety   amLODIPine (NORVASC) 5 mg tablet   No No   Sig: Take 1 tablet (5 mg total) by mouth daily   amoxicillin-clavulanate (AUGMENTIN) 500-125 mg per tablet   No No   Sig: Take 1 tablet by mouth every 8 (eight) hours for 7 days   aspirin 81 mg chewable tablet  Self No No   Sig: Chew 1 tablet (81 mg total) daily   atorvastatin (LIPITOR) 40 mg tablet  Self No No   Sig: TAKE ONE TABLET BY MOUTH ONE TIME DAILY IN THE EVENING    latanoprost (XALATAN) 0 005 % ophthalmic solution  Self Yes No   meloxicam (MOBIC) 7 5 mg tablet  Self No No   Sig: Take 1 tablet (7 5 mg total) by mouth daily   metoprolol tartrate (LOPRESSOR) 25 mg tablet   No No   Sig: Take 1 tablet (25 mg total) by mouth 2 (two) times a day   moxifloxacin (AVELOX) 400 MG tablet   No No   Sig: Take 1 tablet (400 mg total) by mouth daily for 5 days   omeprazole (PriLOSEC) 20 mg delayed release capsule   No No   Sig: Take 1 capsule (20 mg total) by mouth daily   ondansetron (Zofran ODT) 4 mg disintegrating tablet   No No   Sig: Take 1 tablet (4 mg total) by mouth every 6 (six) hours as needed for nausea or vomiting      Facility-Administered Medications: None       Past Medical History:   Diagnosis Date    Allergic rhinitis     Anxiety     Diverticulitis of large intestine without perforation or abscess without bleeding     Dysthymic disorder     Gastro-esophageal reflux disease without esophagitis     HLD (hyperlipidemia)     Hypertension     Osteopenia     Palpitations     Paresthesia     Stroke [facility])        Past Surgical History:   Procedure Laterality Date    APPENDECTOMY      CHOLECYSTECTOMY      COLON SURGERY      FL INJECTION RIGHT HIP (NON ARTHROGRAM)  8/28/2019    HYSTERECTOMY         Family History   Problem Relation Age of Onset    Hypertension Mother     Hypertension Father      I have reviewed and agree with the history as documented  E-Cigarette/Vaping    E-Cigarette Use Never User      E-Cigarette/Vaping Substances    Nicotine No     THC No     CBD No     Flavoring No     Other No     Unknown No      Social History     Tobacco Use    Smoking status: Former Smoker    Smokeless tobacco: Never Used   Vaping Use    Vaping Use: Never used   Substance Use Topics    Alcohol use: Not Currently    Drug use: Not Currently        Review of Systems   Constitutional: Negative for appetite change, chills, diaphoresis, fever and unexpected weight change  HENT: Negative for congestion and rhinorrhea  Eyes: Negative for photophobia and visual disturbance  Respiratory: Positive for shortness of breath  Negative for cough and chest tightness  Cardiovascular: Negative for chest pain, palpitations and leg swelling  Gastrointestinal: Negative for abdominal distention, abdominal pain, blood in stool, constipation, diarrhea, nausea and vomiting  Genitourinary: Negative for dysuria and hematuria     Musculoskeletal: Negative for back pain, joint swelling, neck pain and neck stiffness  Skin: Negative for color change, pallor, rash and wound  Neurological: Negative for dizziness, syncope, weakness, light-headedness and headaches  Psychiatric/Behavioral: Negative for agitation  The patient is nervous/anxious  All other systems reviewed and are negative  Physical Exam  ED Triage Vitals [02/18/22 1430]   Temp Pulse Respirations Blood Pressure SpO2   -- 76 16 (!) 172/86 98 %      Temp src Heart Rate Source Patient Position - Orthostatic VS BP Location FiO2 (%)   -- -- -- -- --      Pain Score       --             Orthostatic Vital Signs  Vitals:    02/18/22 1430   BP: (!) 172/86   Pulse: 76       Physical Exam  Vitals and nursing note reviewed  Constitutional:       General: She is not in acute distress  Appearance: Normal appearance  She is well-developed  She is not ill-appearing, toxic-appearing or diaphoretic  HENT:      Head: Normocephalic and atraumatic  Nose: Nose normal  No congestion or rhinorrhea  Mouth/Throat:      Mouth: Mucous membranes are moist       Pharynx: Oropharynx is clear  No oropharyngeal exudate or posterior oropharyngeal erythema  Eyes:      General: No scleral icterus  Right eye: No discharge  Left eye: No discharge  Extraocular Movements: Extraocular movements intact  Conjunctiva/sclera: Conjunctivae normal       Pupils: Pupils are equal, round, and reactive to light  Neck:      Vascular: No JVD  Trachea: No tracheal deviation  Cardiovascular:      Rate and Rhythm: Normal rate and regular rhythm  Heart sounds: Normal heart sounds  No murmur heard  No friction rub  No gallop  Comments: Normal rate and regular rhythm  Pulmonary:      Effort: Pulmonary effort is normal  No respiratory distress  Breath sounds: Normal breath sounds  No stridor  No wheezing or rales        Comments: Clear to auscultation bilaterally  Chest:      Chest wall: No tenderness  Abdominal:      General: Bowel sounds are normal  There is no distension  Palpations: Abdomen is soft  Tenderness: There is no abdominal tenderness  There is no right CVA tenderness, left CVA tenderness, guarding or rebound  Comments: Soft, nontender, nondistended  Normal bowel sounds throughout   Musculoskeletal:         General: No swelling, tenderness, deformity or signs of injury  Normal range of motion  Cervical back: Normal range of motion and neck supple  No rigidity  No muscular tenderness  Right lower leg: No edema  Left lower leg: No edema  Lymphadenopathy:      Cervical: No cervical adenopathy  Skin:     General: Skin is warm and dry  Coloration: Skin is not pale  Findings: No erythema or rash  Neurological:      General: No focal deficit present  Mental Status: She is alert and oriented to person, place, and time  Mental status is at baseline  Cranial Nerves: No cranial nerve deficit  Sensory: No sensory deficit  Motor: No weakness or abnormal muscle tone  Coordination: Coordination normal       Gait: Gait normal       Comments: A&Ox3 to person, place, and time  CN 2-12 intact  Strength 5/5 throughout  Sensation intact throughout  Cerebellar exam including gait intact  Psychiatric:         Behavior: Behavior normal          Thought Content:  Thought content normal          ED Medications  Medications   potassium chloride (K-DUR,KLOR-CON) CR tablet 40 mEq (40 mEq Oral Given 2/18/22 1654)       Diagnostic Studies  Results Reviewed     Procedure Component Value Units Date/Time    HS Troponin I 2hr [[id number]]  (Normal) Collected: 02/18/22 1735    Lab Status: Final result Specimen: Blood from Arm, Right Updated: 02/18/22 1818     hs TnI 2hr 6 ng/L      Delta 2hr hsTnI -22 ng/L     HS Troponin 0hr (reflex protocol) [[id number]]  (Normal) Collected: 02/18/22 1515    Lab Status: Final result Specimen: Blood from Arm, Right Updated: 02/18/22 1554     hs TnI 0hr 28 ng/L     Basic metabolic panel [[id number]]  (Abnormal) Collected: 02/18/22 1515    Lab Status: Final result Specimen: Blood from Arm, Right Updated: 02/18/22 1542     Sodium 143 mmol/L      Potassium 3 2 mmol/L      Chloride 108 mmol/L      CO2 27 mmol/L      ANION GAP 8 mmol/L      BUN 11 mg/dL      Creatinine 0 88 mg/dL      Glucose 96 mg/dL      Calcium 9 6 mg/dL      eGFR 59 ml/min/1 73sq m     Narrative:      [place] guidelines for Chronic Kidney Disease (CKD):     Stage 1 with normal or high GFR (GFR > 90 mL/min/1 73 square meters)    Stage 2 Mild CKD (GFR = 60-89 mL/min/1 73 square meters)    Stage 3A Moderate CKD (GFR = 45-59 mL/min/1 73 square meters)    Stage 3B Moderate CKD (GFR = 30-44 mL/min/1 73 square meters)    Stage 4 Severe CKD (GFR = 15-29 mL/min/1 73 square meters)    Stage 5 End Stage CKD (GFR <15 mL/min/1 73 square meters)  Note: GFR calculation is accurate only with a steady state creatinine    CBC and differential [[id number]]  (Abnormal) Collected: 02/18/22 1515    Lab Status: Final result Specimen: Blood from Arm, Right Updated: 02/18/22 1537     WBC 8 94 Thousand/uL      RBC 4 36 Million/uL      Hemoglobin 14 0 g/dL      Hematocrit 41 1 %      MCV 94 fL      MCH 32 1 pg      MCHC 34 1 g/dL      RDW 12 4 %      MPV 10 2 fL      Platelets 925 Thousands/uL      nRBC 0 /100 WBCs      Neutrophils Relative 84 %      Immat GRANS % 0 %      Lymphocytes Relative 10 %      Monocytes Relative 4 %      Eosinophils Relative 2 %      Basophils Relative 0 %      Neutrophils Absolute 7 50 Thousands/µL      Immature Grans Absolute 0 04 Thousand/uL      Lymphocytes Absolute 0 87 Thousands/µL      Monocytes Absolute 0 36 Thousand/µL      Eosinophils Absolute 0 13 Thousand/µL      Basophils Absolute 0 04 Thousands/µL                  No orders to display         Procedures  ECG 12 Lead Documentation Only    Date/Time: 2/18/2022 3:08 PM  Performed by: Kofi Reed MD  Authorized by: Kofi Reed MD     Indications / Diagnosis:  SOB  ECG reviewed by me, the ED Provider: yes    Patient location:  ED  Previous ECG:     Previous ECG:  Compared to current    Similarity:  No change    Comparison to cardiac monitor: Yes    Interpretation:     Interpretation: non-specific    Rate:     ECG rate:  86  Rhythm:     Rhythm: sinus rhythm    Ectopy:     Ectopy: none    QRS:     QRS axis:  Normal    QRS intervals:  Normal  Conduction:     Conduction: normal    ST segments:     ST segments:  Non-specific  T waves:     T waves: non-specific            ED Course             HEART Risk Score      Most Recent Value   Heart Score Risk Calculator    History 0 Filed at: 02/18/2022 1530   ECG 1 Filed at: 02/18/2022 1530   Age 2 Filed at: 02/18/2022 1530   Risk Factors 1 Filed at: 02/18/2022 1530   Troponin 1 Filed at: 02/18/2022 1530   HEART Score 5 Filed at: 02/18/2022 1530        Identification of Seniors at Risk      Most Recent Value   (ISAR) Identification of Seniors at Risk    Before the illness or injury that brought you to the Emergency, did you need someone to help you on a regular basis? 0 Filed at: 02/18/2022 1407   In the last 24 hours, have you needed more help than usual? 0 Filed at: 02/18/2022 1407   Have you been hospitalized for one or more nights during the past 6 months? 0 Filed at: 02/18/2022 1407   In general, do you see well? 0 Filed at: 02/18/2022 1407   In general, do you have serious problems with your memory? 0 Filed at: 02/18/2022 1407   Do you take more than three different medications every day?  1 Filed at: 02/18/2022 1407   ISAR Score 1 Filed at: 02/18/2022 1407                              MDM  Number of Diagnoses or Management Options  Anxiety  Hypokalemia  SOB (shortness of breath)  Diagnosis management comments: 61-year-old female history of hypertension on aspirin presenting with episode of shortness of breath attributed to anxiety  Likely related to anxiety  However given age and comorbidities, cannot rule out other etiologies such as cardiac  Plan for cardiac eval   Reassess  EKG no acute process  Troponin 28  Potassium 3 2, repleted  Labs otherwise unremarkable  Delta troponin negative  Discuss results and recommendations  Patient improved  Advised follow-up primary care provider and Cardiology  Medication recommendations  Given instructions and return precautions  All questions answered  Patient and daughter at bedside acknowledged understanding of all written and verbal instructions and return precautions  Discharge  Amount and/or Complexity of Data Reviewed  Clinical lab tests: reviewed and ordered  Tests in the radiology section of CPT®: ordered and reviewed  Tests in the medicine section of CPT®: ordered and reviewed  Review and summarize past medical records: yes  Discuss the patient with other providers: yes  Independent visualization of images, tracings, or specimens: yes    Risk of Complications, Morbidity, and/or Mortality  Presenting problems: moderate  Diagnostic procedures: moderate  Management options: moderate    Patient Progress  Patient progress: improved      Disposition  Final diagnoses:   Anxiety   SOB (shortness of breath)   Hypokalemia     Time reflects when diagnosis was documented in both MDM as applicable and the Disposition within this note     Time User Action Codes Description Comment    2/18/2022  3:18 PM Ulysses Kohut Add [F41 9] Anxiety     2/18/2022  3:18 PM Ulysses Kohut Add [R06 02] SOB (shortness of breath)     2/18/2022  3:56 PM Ulysses Kohut Add [E87 6] Hypokalemia       ED Disposition     ED Disposition Condition Date/Time Comment    Discharge Stable Fri Feb 18, 2022  6:21  Epifanio Graham discharge to home/self care              Follow-up Information     Follow up With Specialties Details Why Contact Info    Michele Amaya MD Internal Medicine Schedule an appointment as soon as possible for a visit in 1 week  [address] [facility]  [phone]            Discharge Medication List as of 2/18/2022  6:22 PM      CONTINUE these medications which have NOT CHANGED    Details   amLODIPine (NORVASC) 5 mg tablet Take 1 tablet (5 mg total) by mouth daily, Starting Thu 2/3/2022, Until Wed 5/4/2022, Normal      aspirin 81 mg chewable tablet Chew 1 tablet (81 mg total) daily, Starting Fri 7/30/2021, Normal      atorvastatin (LIPITOR) 40 mg tablet TAKE ONE TABLET BY MOUTH ONE TIME DAILY IN THE EVENING , Normal      latanoprost (XALATAN) 0 005 % ophthalmic solution Starting Mon 2/8/2021, Historical Med      LORazepam (ATIVAN) 0 5 mg tablet Take 1 tablet (0 5 mg total) by mouth 2 (two) times a day as needed for anxiety, Starting Mon 2/14/2022, Until Wed 3/16/2022 at 2359, Normal      meloxicam (MOBIC) 7 5 mg tablet Take 1 tablet (7 5 mg total) by mouth daily, Starting Tue 8/3/2021, Normal      metoprolol tartrate (LOPRESSOR) 25 mg tablet Take 1 tablet (25 mg total) by mouth 2 (two) times a day, Starting Thu 2/3/2022, Normal      moxifloxacin (AVELOX) 400 MG tablet Take 1 tablet (400 mg total) by mouth daily for 5 days, Starting Wed 2/16/2022, Until Mon 2/21/2022, Normal      omeprazole (PriLOSEC) 20 mg delayed release capsule Take 1 capsule (20 mg total) by mouth daily, Starting Thu 2/3/2022, Normal      ondansetron (Zofran ODT) 4 mg disintegrating tablet Take 1 tablet (4 mg total) by mouth every 6 (six) hours as needed for nausea or vomiting, Starting Wed 2/16/2022, Normal         STOP taking these medications       amoxicillin-clavulanate (AUGMENTIN) 500-125 mg per tablet Comments:   Reason for Stopping:                 PDMP Review       Value Time User    PDMP Reviewed  Yes 5/25/2021  2:10 PM Michelle Layton MD           ED Provider  Attending physically available and evaluated Taj Carr  NICKY managed the patient along with the ED Attending      Electronically Signed by Lex Collins MD  02/18/22 9278

## 2022-02-18 NOTE — DISCHARGE INSTRUCTIONS
Please follow-up primary care provider  Please take medications as prescribed  Please return for severely worsening shortness of breath, severe chest pain, fainting, or any other concerning signs or symptoms  Please refer to the following documents for additional instructions return precautions

## 2022-02-21 LAB
ATRIAL RATE: 86 BPM
P AXIS: 68 DEGREES
PR INTERVAL: 162 MS
QRS AXIS: -12 DEGREES
QRSD INTERVAL: 82 MS
QT INTERVAL: 352 MS
QTC INTERVAL: 421 MS
T WAVE AXIS: 45 DEGREES
VENTRICULAR RATE: 86 BPM

## 2022-02-21 PROCEDURE — 93010 ELECTROCARDIOGRAM REPORT: CPT | Performed by: INTERNAL MEDICINE

## 2022-02-24 NOTE — ED ATTENDING ATTESTATION
2/18/2022  ICarrie DO, saw and evaluated the patient  I have discussed the patient with the resident/non-physician practitioner and agree with the resident's/non-physician practitioner's findings, Plan of Care, and MDM as documented in the resident's/non-physician practitioner's note, except where noted  All available labs and Radiology studies were reviewed  I was present for key portions of any procedure(s) performed by the resident/non-physician practitioner and I was immediately available to provide assistance  At this point I agree with the current assessment done in the Emergency Department  I have conducted an independent evaluation of this patient a history and physical is as follows:    80yo female presents with anxiety  PT states she had been seen in ED recently and diag with hemorrhoid which made her concerned today and she got anxious and SOB  Deneis CP, no abd pain, no n/v   PT states she feels much better now and currently denies complaints  Family (daughter) at bedside and reports pt has had similar episodes in the past   ON exam - nad, heart reg, no resp distress, alert, calm, answering questions appropriately    Plan - check labs and EKG and reassess    ED Course         Critical Care Time  Procedures

## 2022-02-28 ENCOUNTER — OFFICE VISIT (OUTPATIENT)
Dept: INTERNAL MEDICINE CLINIC | Facility: CLINIC | Age: 87
End: 2022-02-28
Payer: MEDICARE

## 2022-02-28 VITALS
OXYGEN SATURATION: 97 % | DIASTOLIC BLOOD PRESSURE: 96 MMHG | BODY MASS INDEX: 18.25 KG/M2 | WEIGHT: 103 LBS | HEIGHT: 63 IN | HEART RATE: 81 BPM | SYSTOLIC BLOOD PRESSURE: 140 MMHG | TEMPERATURE: 99.7 F

## 2022-02-28 DIAGNOSIS — I10 HYPERTENSION: Primary | ICD-10-CM

## 2022-02-28 DIAGNOSIS — E78.5 HYPERLIPIDEMIA: ICD-10-CM

## 2022-02-28 DIAGNOSIS — F41.9 ANXIETY: ICD-10-CM

## 2022-02-28 PROCEDURE — 99214 OFFICE O/P EST MOD 30 MIN: CPT | Performed by: INTERNAL MEDICINE

## 2022-02-28 NOTE — PROGRESS NOTES
Assessment/Plan:  Follow up  Hypertension, Hyperlipidemia,  Anxiety , Diverticulitis   Diagnoses and all orders for this visit:    Hypertension    Hyperlipidemia    Anxiety          Subjective:      Patient ID: Jefferson Steve is a 81 y o  female  HPI    The following portions of the patient's history were reviewed and updated as appropriate: allergies, current medications, past family history, past medical history, past social history, past surgical history, and problem list     Review of Systems   Constitutional: Negative  HENT: Negative for dental problem, drooling, ear discharge and ear pain  Eyes: Negative for discharge, redness and itching  Respiratory: Negative for apnea, cough and wheezing  Cardiovascular: Negative for chest pain and palpitations  Gastrointestinal: Negative for abdominal pain, blood in stool, diarrhea and vomiting  Endocrine: Negative for polydipsia, polyphagia and polyuria  Genitourinary: Negative for decreased urine volume, dysuria and frequency  Musculoskeletal: Negative for arthralgias, myalgias and neck stiffness  Skin: Negative for pallor and wound  Allergic/Immunologic: Negative for environmental allergies and food allergies  Neurological: Negative for facial asymmetry, light-headedness, numbness and headaches  Hematological: Negative for adenopathy  Does not bruise/bleed easily  Psychiatric/Behavioral: Negative for agitation, behavioral problems and confusion  Objective:      /96 (BP Location: Left arm, Patient Position: Sitting, Cuff Size: Adult)   Pulse 81   Temp 99 7 °F (37 6 °C) (Temporal)   Ht 5' 3" (1 6 m)   Wt 46 7 kg (103 lb)   SpO2 97%   BMI 18 25 kg/m²          Physical Exam  Constitutional:       Appearance: Normal appearance  HENT:      Head: Normocephalic  Nose: Nose normal       Mouth/Throat:      Mouth: Mucous membranes are moist    Eyes:      Pupils: Pupils are equal, round, and reactive to light  Cardiovascular:      Rate and Rhythm: Regular rhythm  Heart sounds: Normal heart sounds  Pulmonary:      Breath sounds: Normal breath sounds  Abdominal:      Palpations: Abdomen is soft  Musculoskeletal:         General: No swelling  Cervical back: Neck supple  Skin:     General: Skin is warm  Neurological:      General: No focal deficit present  Mental Status: She is alert and oriented to person, place, and time  Psychiatric:         Mood and Affect: Mood normal        Hypertension    Well  Controlled with current meds    Hyperlipidemiia   Controlled with  Statin  Fup  4 months          LUKE Kimbrough MD

## 2022-03-16 DIAGNOSIS — F41.0 ANXIETY ATTACK: ICD-10-CM

## 2022-03-16 RX ORDER — LORAZEPAM 0.5 MG/1
0.5 TABLET ORAL 2 TIMES DAILY PRN
Qty: 60 TABLET | Refills: 0 | Status: SHIPPED | OUTPATIENT
Start: 2022-03-16 | End: 2022-05-06 | Stop reason: SDUPTHER

## 2022-05-06 DIAGNOSIS — F41.0 ANXIETY ATTACK: ICD-10-CM

## 2022-05-06 DIAGNOSIS — I10 ESSENTIAL HYPERTENSION: ICD-10-CM

## 2022-05-06 RX ORDER — LORAZEPAM 0.5 MG/1
0.5 TABLET ORAL 2 TIMES DAILY PRN
Qty: 60 TABLET | Refills: 0 | Status: SHIPPED | OUTPATIENT
Start: 2022-05-06 | End: 2022-06-13 | Stop reason: SDUPTHER

## 2022-06-01 ENCOUNTER — APPOINTMENT (EMERGENCY)
Dept: RADIOLOGY | Facility: HOSPITAL | Age: 87
DRG: 078 | End: 2022-06-01
Payer: MEDICARE

## 2022-06-01 ENCOUNTER — HOSPITAL ENCOUNTER (INPATIENT)
Facility: HOSPITAL | Age: 87
LOS: 1 days | Discharge: HOME | DRG: 078 | End: 2022-06-02
Attending: EMERGENCY MEDICINE | Admitting: INTERNAL MEDICINE
Payer: MEDICARE

## 2022-06-01 DIAGNOSIS — R29.90 STROKE-LIKE SYMPTOMS: Primary | ICD-10-CM

## 2022-06-01 DIAGNOSIS — E53.8 VITAMIN B12 DEFICIENCY: ICD-10-CM

## 2022-06-01 LAB
2HR DELTA HS TROPONIN: 0 NG/L
4HR DELTA HS TROPONIN: 0 NG/L
ANION GAP SERPL CALCULATED.3IONS-SCNC: 4 MMOL/L (ref 4–13)
APTT PPP: 30 SECONDS (ref 23–37)
ATRIAL RATE: 76 BPM
BUN SERPL-MCNC: 18 MG/DL (ref 5–25)
CALCIUM SERPL-MCNC: 10 MG/DL (ref 8.3–10.1)
CARDIAC TROPONIN I PNL SERPL HS: 3 NG/L
CHLORIDE SERPL-SCNC: 109 MMOL/L (ref 100–108)
CO2 SERPL-SCNC: 28 MMOL/L (ref 21–32)
CREAT SERPL-MCNC: 0.97 MG/DL (ref 0.6–1.3)
ERYTHROCYTE [DISTWIDTH] IN BLOOD BY AUTOMATED COUNT: 12.6 % (ref 11.6–15.1)
FLUAV RNA RESP QL NAA+PROBE: NEGATIVE
FLUBV RNA RESP QL NAA+PROBE: NEGATIVE
GFR SERPL CREATININE-BSD FRML MDRD: 53 ML/MIN/1.73SQ M
GLUCOSE SERPL-MCNC: 90 MG/DL (ref 65–140)
GLUCOSE SERPL-MCNC: 99 MG/DL (ref 65–140)
HCT VFR BLD AUTO: 40.5 % (ref 34.8–46.1)
HGB BLD-MCNC: 13.7 G/DL (ref 11.5–15.4)
INR PPP: 1.04 (ref 0.84–1.19)
MCH RBC QN AUTO: 32.5 PG (ref 26.8–34.3)
MCHC RBC AUTO-ENTMCNC: 33.8 G/DL (ref 31.4–37.4)
MCV RBC AUTO: 96 FL (ref 82–98)
P AXIS: 110 DEGREES
PLATELET # BLD AUTO: 218 THOUSANDS/UL (ref 149–390)
PMV BLD AUTO: 10.8 FL (ref 8.9–12.7)
POTASSIUM SERPL-SCNC: 4.4 MMOL/L (ref 3.5–5.3)
PR INTERVAL: 162 MS
PROTHROMBIN TIME: 13.2 SECONDS (ref 11.6–14.5)
QRS AXIS: 6 DEGREES
QRSD INTERVAL: 70 MS
QT INTERVAL: 358 MS
QTC INTERVAL: 402 MS
RBC # BLD AUTO: 4.22 MILLION/UL (ref 3.81–5.12)
RSV RNA RESP QL NAA+PROBE: NEGATIVE
SARS-COV-2 RNA RESP QL NAA+PROBE: NEGATIVE
SODIUM SERPL-SCNC: 141 MMOL/L (ref 136–145)
T WAVE AXIS: 33 DEGREES
VENTRICULAR RATE: 76 BPM
WBC # BLD AUTO: 7.86 THOUSAND/UL (ref 4.31–10.16)

## 2022-06-01 PROCEDURE — 84484 ASSAY OF TROPONIN QUANT: CPT | Performed by: EMERGENCY MEDICINE

## 2022-06-01 PROCEDURE — 70498 CT ANGIOGRAPHY NECK: CPT

## 2022-06-01 PROCEDURE — 80048 BASIC METABOLIC PNL TOTAL CA: CPT | Performed by: EMERGENCY MEDICINE

## 2022-06-01 PROCEDURE — 85610 PROTHROMBIN TIME: CPT | Performed by: EMERGENCY MEDICINE

## 2022-06-01 PROCEDURE — 99284 EMERGENCY DEPT VISIT MOD MDM: CPT | Performed by: PSYCHIATRY & NEUROLOGY

## 2022-06-01 PROCEDURE — 36415 COLL VENOUS BLD VENIPUNCTURE: CPT | Performed by: EMERGENCY MEDICINE

## 2022-06-01 PROCEDURE — 85730 THROMBOPLASTIN TIME PARTIAL: CPT | Performed by: EMERGENCY MEDICINE

## 2022-06-01 PROCEDURE — 82948 REAGENT STRIP/BLOOD GLUCOSE: CPT

## 2022-06-01 PROCEDURE — 70496 CT ANGIOGRAPHY HEAD: CPT

## 2022-06-01 PROCEDURE — 93005 ELECTROCARDIOGRAM TRACING: CPT

## 2022-06-01 PROCEDURE — 84484 ASSAY OF TROPONIN QUANT: CPT | Performed by: STUDENT IN AN ORGANIZED HEALTH CARE EDUCATION/TRAINING PROGRAM

## 2022-06-01 PROCEDURE — 99285 EMERGENCY DEPT VISIT HI MDM: CPT

## 2022-06-01 PROCEDURE — 85027 COMPLETE CBC AUTOMATED: CPT | Performed by: EMERGENCY MEDICINE

## 2022-06-01 PROCEDURE — 93010 ELECTROCARDIOGRAM REPORT: CPT | Performed by: INTERNAL MEDICINE

## 2022-06-01 PROCEDURE — 0241U HB NFCT DS VIR RESP RNA 4 TRGT: CPT | Performed by: STUDENT IN AN ORGANIZED HEALTH CARE EDUCATION/TRAINING PROGRAM

## 2022-06-01 PROCEDURE — 99285 EMERGENCY DEPT VISIT HI MDM: CPT | Performed by: EMERGENCY MEDICINE

## 2022-06-01 RX ORDER — LABETALOL HYDROCHLORIDE 5 MG/ML
10 INJECTION, SOLUTION INTRAVENOUS EVERY 6 HOURS PRN
Status: DISCONTINUED | OUTPATIENT
Start: 2022-06-01 | End: 2022-06-02 | Stop reason: HOSPADM

## 2022-06-01 RX ORDER — ENOXAPARIN SODIUM 100 MG/ML
40 INJECTION SUBCUTANEOUS
Status: DISCONTINUED | OUTPATIENT
Start: 2022-06-01 | End: 2022-06-02 | Stop reason: HOSPADM

## 2022-06-01 RX ORDER — LATANOPROST 50 UG/ML
1 SOLUTION/ DROPS OPHTHALMIC
Status: DISCONTINUED | OUTPATIENT
Start: 2022-06-01 | End: 2022-06-02 | Stop reason: HOSPADM

## 2022-06-01 RX ORDER — AMLODIPINE BESYLATE 5 MG/1
5 TABLET ORAL DAILY
Status: DISCONTINUED | OUTPATIENT
Start: 2022-06-01 | End: 2022-06-02 | Stop reason: HOSPADM

## 2022-06-01 RX ORDER — ATORVASTATIN CALCIUM 40 MG/1
40 TABLET, FILM COATED ORAL EVERY EVENING
Status: DISCONTINUED | OUTPATIENT
Start: 2022-06-01 | End: 2022-06-02 | Stop reason: HOSPADM

## 2022-06-01 RX ORDER — ASPIRIN 81 MG/1
81 TABLET, CHEWABLE ORAL DAILY
Status: DISCONTINUED | OUTPATIENT
Start: 2022-06-02 | End: 2022-06-02 | Stop reason: HOSPADM

## 2022-06-01 RX ORDER — PANTOPRAZOLE SODIUM 40 MG/1
40 TABLET, DELAYED RELEASE ORAL
Status: DISCONTINUED | OUTPATIENT
Start: 2022-06-02 | End: 2022-06-02 | Stop reason: HOSPADM

## 2022-06-01 RX ADMIN — METOPROLOL TARTRATE 25 MG: 25 TABLET, FILM COATED ORAL at 19:22

## 2022-06-01 RX ADMIN — IOHEXOL 65 ML: 350 INJECTION, SOLUTION INTRAVENOUS at 12:41

## 2022-06-01 RX ADMIN — AMLODIPINE BESYLATE 5 MG: 5 TABLET ORAL at 15:53

## 2022-06-01 NOTE — ASSESSMENT & PLAN NOTE
Patient with history of GERD  · Outpatient medication omeprazole   · Continue pantoprazole while inpatient

## 2022-06-01 NOTE — ASSESSMENT & PLAN NOTE
Patient presents to ED with daughter for concerns of altered mental status and word-finding difficulty  Approximately 9:30 a m  off family noted a change with transient difficulty in speech as patient could not find the correct words that she wanted to say  No other neurologic deficits at the time  This is similar presentation to July of 2021 when patient presented with word-finding difficulty and significant hypertension of 204/99  Patient was diagnosed with TIA initiated on aspirin at that time  NIHSS 0 and symptoms improved  BP was elevated to 198/82  · Stroke alert upon arrival to ED  · CT/CTA negative for acute intracranial abnormality  Negative for large vessel occlusion, dissection, aneurysm, or high-grade stenosis  Chronic microangiopathic disease noted  · Neurology consulted, their recommendations are appreciated  · Process is more concerning for hypertensive encephalopathy at this time  · Neurology consulted  · Low suspicion for TIA  Most likely hypertensive encephalopathy  · Will defer MRI and Echo at this time  · Continue aspirin 81 mg daily  · Continue atorvastatin 40 mg daily  · HbA1c 5 1 Lipid panel showed HDL 77, LDL 82, TG 54  · Monitor BP  Goal normotensive  PRN labetalol ordered  · BP improved to 141/58  · Continue home med: amlodipine 5 mg and lopressor 25 mg bid   Follow up with PCP in 1 week with BP log

## 2022-06-01 NOTE — H&P
INTERNAL MEDICINE RESIDENCY ADMISSION H&P     Name: Praneeth Castañeda   Age & Sex: 81 y o  female   MRN: [id number]  Unit/Bed#: ED 29    Encounter: [id number]  Primary Care Provider: Liat Mcdaniel MD    Code Status: Level 1 - Full Code  Admission Status: INPATIENT   Disposition: Patient requires Med/Surg with Telemetry    Admit to team: SOD Team B     ASSESSMENT/PLAN     Principal Problem:    Word finding difficulty  Active Problems:    Essential hypertension    GERD (gastroesophageal reflux disease)      * Word finding difficulty  Assessment & Plan  Patient presents to ED with daughter for concerns of altered mental status and word-finding difficulty  Approximately 9:30 a m  off family noted a change with transient difficulty in speech as patient could not find the correct words that she wanted to say  No other neurologic deficits at the time  This is similar presentation to July of 2021 when patient presented with word-finding difficulty and significant hypertension of 204/99  Patient was diagnosed with TIA initiated on aspirin at that time  Upon arrival to ED today patient systolic BP in 466P imrpoved to 130s  NIHSS 0 and symptoms improved  CT/CTA unremarkable though chronic microangiopathic disease noted  · Suspect patient may have TIA  Possibly due to hypertension  · Stroke alert upon arrival to ED  · CT/CTA negative for acute intracranial abnormality  · Neurology consulted, their recommendations are appreciated  · Process is more concerning for hypertensive encephalopathy at this time  Will not admit on stroke pathway per recommendations of neurology team    · Will defer MRI and Echo at this time  · Continue aspirin 81 mg daily  · Continue atorvastatin 40 mg daily  · F/u A1c and lipid panel  · Monitor BP  Goal normotensive  PRN labetalol ordered     · NPO for now pending bedside nursing dysphagia assessment       GERD (gastroesophageal reflux disease)  Assessment & Plan  Patient with history of GERD  · Outpatient medication omeprazole   · Continue pantoprazole while inpatient    Essential hypertension  Assessment & Plan  Patient with history of hypertension  Systolic BP 885D upon arrival to ED  Improved to 130  · Continue home amlodipine 5 mg daily  · Continue home metoprolol tartrate 25 mg BID  · PRN labetalol   · Monitor BP      VTE Pharmacologic Prophylaxis: Enoxaparin (Lovenox)  VTE Mechanical Prophylaxis: sequential compression device    CHIEF COMPLAINT     Chief Complaint   Patient presents with    Weakness - Generalized     Pt brought in from home for weakness and dizziness; per EMS, family reported alt mental status lasting 30 minutes earlier and a hx TIAs      [place]     Ms Sue Durant is an 72-year-old female with a past medical history of  HTN, HLD, and anxiety that presents to the ED today with her daughter for concerns of altered mental status and word-finding difficulty  Patient lives at home with her daughter  Daughter provided most of the history upon arrival to ED, however she was not present during my encounter  Rest of history obtained from patient and through chart review  Patient woke up this morning in normal state of health, but around 9:30 a m  family noted a change in her behavior  She was having difficulty speaking and they describe did as if she was not able to find the correct word that she was looking for  Per daughter, she was unable to find the correct words to answer her name, date, and current president  No other focal neurologic deficits were noted at this time  Her daughter, this is a similar presentation to of prior events in July of 2021  At that time patient presented with word-finding difficulty in setting of significant hypertension with systolic ER in 374Q  CT and CTA at that time was negative  Patient was started on aspirin with concern for a possible TIA at that time  Upon arrival to the emergency department today    Patient was stroke alert  Blood pressure upon arrival was systolic 601R but that did improve to 471 systolic  NIHSS was 0  Patient clinically improved  A CT and CTA was negative for any acute intracranial abnormalities her though chronic microangiopathic disease was noted  Patient presently denied any fever, chills, nausea, vomiting, diarrhea, constipation, chest pain, shortness a breath  Patient states that she feels like her word-finding difficulties much improved and she is able to speak without any evidence of word-finding difficulty  REVIEW OF SYSTEMS     Review of Systems   Constitutional: Negative for chills, fatigue and fever  HENT: Negative for congestion, rhinorrhea and sore throat  Eyes: Negative for pain and redness  Respiratory: Negative for cough, chest tightness, shortness of breath and wheezing  Cardiovascular: Negative for chest pain, palpitations and leg swelling  Gastrointestinal: Negative for abdominal distention, abdominal pain, constipation, diarrhea, nausea and vomiting  Endocrine: Negative for cold intolerance, heat intolerance and polydipsia  Genitourinary: Negative for difficulty urinating, dysuria and flank pain  Musculoskeletal: Negative for arthralgias and back pain  Skin: Negative for rash and wound  Neurological: Negative for dizziness, syncope, facial asymmetry, speech difficulty, weakness, light-headedness, numbness and headaches  Psychiatric/Behavioral: Negative for agitation, behavioral problems and confusion  OBJECTIVE     Vitals:    22 1123 22 1200   BP: 165/74 135/62   BP Location: Left arm    Pulse: 83 74   Resp: 18 20   Temp: 97 8 °F (36 6 °C)    TempSrc: Oral    SpO2: 95% 96%      Temperature:   Temp (24hrs), Av 8 °F (36 6 °C), Min:97 8 °F (36 6 °C), Max:97 8 °F (36 6 °C)    Temperature: 97 8 °F (36 6 °C)  Intake & Output:  I/O     None        Weights: There is no height or weight on file to calculate BMI    Weight (last 2 days) None        Physical Exam  Constitutional:       General: She is not in acute distress  Appearance: Normal appearance  She is not ill-appearing  HENT:      Head: Normocephalic and atraumatic  Mouth/Throat:      Mouth: Mucous membranes are moist       Pharynx: Oropharynx is clear  Eyes:      Extraocular Movements: Extraocular movements intact  Pupils: Pupils are equal, round, and reactive to light  Cardiovascular:      Rate and Rhythm: Normal rate and regular rhythm  Pulses: Normal pulses  Heart sounds: Normal heart sounds  No murmur heard  Pulmonary:      Effort: Pulmonary effort is normal  No respiratory distress  Breath sounds: Normal breath sounds  No wheezing  Abdominal:      General: Abdomen is flat  Bowel sounds are normal  There is no distension  Palpations: Abdomen is soft  Musculoskeletal:      Right lower leg: No edema  Left lower leg: No edema  Skin:     General: Skin is warm and dry  Neurological:      General: No focal deficit present  Mental Status: She is alert and oriented to person, place, and time  Motor: No weakness  Psychiatric:         Mood and Affect: Mood normal          Behavior: Behavior normal          Thought Content:  Thought content normal        PAST MEDICAL HISTORY     Past Medical History:   Diagnosis Date    Allergic rhinitis     Anxiety     Diverticulitis of large intestine without perforation or abscess without bleeding     Dysthymic disorder     Gastro-esophageal reflux disease without esophagitis     HLD (hyperlipidemia)     Hypertension     Osteopenia     Palpitations     Paresthesia     Stroke ([address] )      PAST SURGICAL HISTORY     Past Surgical History:   Procedure Laterality Date    APPENDECTOMY      CHOLECYSTECTOMY      COLON SURGERY      FL INJECTION RIGHT HIP (NON ARTHROGRAM)  8/28/2019    HYSTERECTOMY       SOCIAL & FAMILY HISTORY     Social History     Substance and Sexual Activity Alcohol Use Not Currently     Substance and Sexual Activity   Alcohol Use Not Currently        Substance and Sexual Activity   Drug Use Not Currently     Social History     Tobacco Use   Smoking Status Former Smoker   Smokeless Tobacco Never Used     Family History   Problem Relation Age of Onset    Hypertension Mother     Hypertension Father      LABORATORY DATA     Labs: I have personally reviewed pertinent reports  Results from last 7 days   Lab Units 06/01/22  1233   WBC Thousand/uL 7 86   HEMOGLOBIN g/dL 13 7   HEMATOCRIT % 40 5   PLATELETS Thousands/uL 218      Results from last 7 days   Lab Units 06/01/22  1233   POTASSIUM mmol/L 4 4   CHLORIDE mmol/L 109*   CO2 mmol/L 28   BUN mg/dL 18   CREATININE mg/dL 0 97   CALCIUM mg/dL 10 0              Results from last 7 days   Lab Units 06/01/22  1233   INR  1 04   PTT seconds 30             Micro:  No results found for: BLOODCX, Noemi Baba, WOUNDCULT, SPUTUMCULTUR  IMAGING & DIAGNOSTIC TESTS     Imaging: I have personally reviewed pertinent reports  CT stroke alert brain    Result Date: 6/1/2022  Impression: No acute intracranial abnormality  Mild chronic microangiopathy  Findings were directly discussed with Richard Dee at 12:54 PM  Workstation performed: [id number]     CTA stroke alert (head/neck)    Result Date: 6/1/2022  Impression: Negative CTA head and neck for large vessel occlusion, dissection, aneurysm, or high-grade stenosis  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  Authorizing Provider   aspirin 81 mg chewable tablet Chew 1 tablet (81 mg total) daily 7/30/21  Yes Lara Moore DO   atorvastatin (LIPITOR) 40 mg tablet TAKE ONE TABLET BY MOUTH ONE TIME DAILY IN THE EVENING  9/7/21  Yes Lara Moore DO   latanoprost (XALATAN) 0 005 % ophthalmic solution  2/8/21  Yes Historical Provider, MD   LORazepam (ATIVAN) 0 5 mg tablet Take 1 tablet (0 5 mg total) by mouth 2 (two) times a day as needed for anxiety 5/6/22 6/5/22 Yes Dari Warren MD   metoprolol tartrate (LOPRESSOR) 25 mg tablet Take 1 tablet (25 mg total) by mouth 2 (two) times a day 5/6/22  Yes Dari Warren MD   omeprazole (PriLOSEC) 20 mg delayed release capsule Take 1 capsule (20 mg total) by mouth daily 2/3/22  Yes Dari Warren MD   amLODIPine (NORVASC) 5 mg tablet Take 1 tablet (5 mg total) by mouth daily 2/3/22 5/4/22  Dari Warren MD   meloxicam (MOBIC) 7 5 mg tablet Take 1 tablet (7 5 mg total) by mouth daily 8/3/21   Char Hopson PA-C   ondansetron (Zofran ODT) 4 mg disintegrating tablet Take 1 tablet (4 mg total) by mouth every 6 (six) hours as needed for nausea or vomiting  Patient not taking: Reported on 6/1/2022 2/16/22   Katerin Brown MD     MEDICATIONS ADMINISTERED IN LAST 24 HOURS     Medication Administration - last 24 hours from 05/31/2022 1451 to 06/01/2022 1451       Date/Time Order Dose Route Action Action by     06/01/2022 1241 iohexol (OMNIPAQUE) 350 MG/ML injection (SINGLE-DOSE) 65 mL 65 mL Intravenous Given Rockey Jeans        CURRENT MEDICATIONS     Current Facility-Administered Medications   Medication Dose Route Frequency Provider Last Rate    amLODIPine  5 mg Oral Daily Levester Mustache, DO      [START ON 6/2/2022] aspirin  81 mg Oral Daily Levester Mustache, DO      atorvastatin  40 mg Oral QPM Levester Mustache, DO      enoxaparin  40 mg Subcutaneous Q24H [address] Levester Mustache, DO      labetalol  10 mg Intravenous Q6H PRN Levester Mustache, DO      latanoprost  1 drop Both Eyes HS Levroque Mustache, DO      metoprolol tartrate  25 mg Oral BID Verna Heck DO Sadi      [START ON 6/2/2022] pantoprazole  40 mg Oral Early Morning Shorty Moseley DO          labetalol, 10 mg, Q6H PRN        Admission Time  I spent 45 minutes admitting the patient  This involved direct patient contact where I performed a full history and physical, reviewing previous records, and reviewing laboratory and other diagnostic studies  Portions of the record may have been created with voice recognition software  Occasional wrong word or "sound a like" substitutions may have occurred due to the inherent limitations of voice recognition software    Read the chart carefully and recognize, using context, where substitutions have occurred     ==  Shorty Moseley DO  [address]  Internal Medicine Residency PGY-1

## 2022-06-01 NOTE — PROGRESS NOTES
06/01/22 1400   Clinical Encounter Type   Visited With Family; Patient not available   Routine Visit Introduction   Crisis Visit Code  (Stroke alert)

## 2022-06-01 NOTE — ASSESSMENT & PLAN NOTE
Patient with history of hypertension  Systolic BP 901X upon arrival to ED   Improved to 130  · Continue home amlodipine 5 mg daily  · Continue home metoprolol tartrate 25 mg BID  · PRN labetalol   · Monitor BP

## 2022-06-01 NOTE — CONSULTS
Consultation - Stroke   Keiko Weller 81 y o  female MRN: [id number]  Unit/Bed#: ED 29 Encounter: [id number]      Assessment/Plan     81year old female with HTN, HLD, and mild cognitive impairment presented to the ED for evaluation of transient word finding difficulty, similar to her presentation in July 2021  A stroke alert was activated in the ED  Symptoms resolved by the time patient arrived  NIHSS 0  CT/CTA unremarkable for acute pathology  She was not a TPA candidate as her symptoms had resolved and there was low suspicion for vascular event  Patient's previous episode of word finding difficulty in July 2021 was attributed to TIA vs hypertensive urgency (BP > 200)  Given that patient had a recurrent episode of the same symptoms, TIA is unlikely without evidence of correlating vascular disease  She had blood pressures this time in the 160s initially, later as high as 198/82 after CT  More suspicious for hypertensive encephalopathy at this time  Plan:   - Can defer MRI/stroke pathway at this time  - Continue aspirin 81 mg and atorvastatin 40 mg daily  - Goal normotension (avoid rapid decrease/hypotension)  Will defer management to primary team    - Neuro checks  Contact neurology with any recurrent or new symptoms    - Medical management and supportive care per primary team  Correction of any metabolic or infectious disturbances  Keiko Weller will not need outpatient follow up with Neurology  She will not require outpatient neurological testing  History of Present Illness     Reason for Consult / Principal Problem: Stroke alert  Patient last known well: 9 am  Stroke alert called: Esha Lowery  Neurology time of arrival: 1234    HPI: Keiko Weller is a 81 y o   female with HTN, HLD, mild cognitive impairment, and anxiety presented to the ED for evaluation of altered mental status and word finding difficulty  Patient lives with her daughter   She woke up in her normal state of health, however around 9:30 am, family noted a change  This morning, family noted transient difficulty with speech  She was searching for words  She was not saying the wrong word, but just didn't know what to say  Daughter asked her to tell her her name, the current date, and the current president, and she was unable to come up with the answers  She did not have any other focal neurologic deficits at home  Daughter reports this presentation was similar to her presentation in July 2021  Per chart, patient presented with word finding difficulty in setting of significant hypertension (204/99 and as high as 803 systolic)  CT/CTA negative at that time  She was initiated on aspirin and statin with concern for possible TIA  A stroke alert was activated this time in the ED  On arrival, blood pressure was in the 524O systolic, but did improve to 642X systolic  NIHSS 0 at time of neurologic exam and daughter reports that she appeared much improved  CT/CTA again unremarkable  Chronic microangiopathic disease noted  BP did appear to increase as high as 198/82 after patient returned from CT scan, but patient's neurologic exam remained stable  Lab work was unremarkable  She has been compliant with aspirin  Inpatient consult to Neurology  Consult performed by: Mauro Dozier PA-C  Consult ordered by: Terry Benson DO          Review of Systems   Musculoskeletal:        Right hip pain   Neurological:        Transient word finding difficulty  All other systems reviewed and are negative        Historical Information   Past Medical History:   Diagnosis Date    Allergic rhinitis     Anxiety     Diverticulitis of large intestine without perforation or abscess without bleeding     Dysthymic disorder     Gastro-esophageal reflux disease without esophagitis     HLD (hyperlipidemia)     Hypertension     Osteopenia     Palpitations     Paresthesia     Stroke [facility])      Past Surgical History:   Procedure Laterality Date    APPENDECTOMY      CHOLECYSTECTOMY      COLON SURGERY      FL INJECTION RIGHT HIP (NON ARTHROGRAM)  8/28/2019    HYSTERECTOMY       Social History   Social History     Substance and Sexual Activity   Alcohol Use Not Currently     Social History     Substance and Sexual Activity   Drug Use Not Currently     E-Cigarette/Vaping    E-Cigarette Use Never User      E-Cigarette/Vaping Substances    Nicotine No     THC No     CBD No     Flavoring No     Other No     Unknown No      Social History     Tobacco Use   Smoking Status Former Smoker   Smokeless Tobacco Never Used     Family History:   Family History   Problem Relation Age of Onset    Hypertension Mother     Hypertension Father        Review of previous medical records was completed  Meds/Allergies   all current active meds have been reviewed, current meds:   No current facility-administered medications for this encounter  and PTA meds:   Prior to Admission Medications   Prescriptions Last Dose Informant Patient Reported? Taking?    LORazepam (ATIVAN) 0 5 mg tablet 5/31/2022 at Unknown time  No Yes   Sig: Take 1 tablet (0 5 mg total) by mouth 2 (two) times a day as needed for anxiety   amLODIPine (NORVASC) 5 mg tablet  Self No No   Sig: Take 1 tablet (5 mg total) by mouth daily   aspirin 81 mg chewable tablet 6/1/2022 at Unknown time Self No Yes   Sig: Chew 1 tablet (81 mg total) daily   atorvastatin (LIPITOR) 40 mg tablet 6/1/2022 at Unknown time Self No Yes   Sig: TAKE ONE TABLET BY MOUTH ONE TIME DAILY IN THE EVENING    latanoprost (XALATAN) 0 005 % ophthalmic solution 5/31/2022 at Unknown time Self Yes Yes   meloxicam (MOBIC) 7 5 mg tablet Unknown at Unknown time Self No No   Sig: Take 1 tablet (7 5 mg total) by mouth daily   metoprolol tartrate (LOPRESSOR) 25 mg tablet 6/1/2022 at Unknown time  No Yes   Sig: Take 1 tablet (25 mg total) by mouth 2 (two) times a day   omeprazole (PriLOSEC) 20 mg delayed release capsule 6/1/2022 at Unknown time Self No Yes   Sig: Take 1 capsule (20 mg total) by mouth daily   ondansetron (Zofran ODT) 4 mg disintegrating tablet Not Taking at Unknown time Self No No   Sig: Take 1 tablet (4 mg total) by mouth every 6 (six) hours as needed for nausea or vomiting   Patient not taking: Reported on 6/1/2022      Facility-Administered Medications: None       No Known Allergies    Objective   Vitals:Blood pressure 135/62, pulse 74, temperature 97 8 °F (36 6 °C), temperature source Oral, resp  rate 20, SpO2 96 %  ,There is no height or weight on file to calculate BMI  No intake or output data in the 24 hours ending 06/01/22 1245    Invasive Devices: Invasive Devices  Report    Peripheral Intravenous Line  Duration           Peripheral IV 06/01/22 Right Antecubital <1 day                Physical Exam:   Vital signs and nursing notes reviewed  Constitutional: Appears comfortable  Well developed/well nourished  No diaphoresis  No acute distress  HENT: Normocephalic, atraumatic  B/L external ears and nose appears normal  Oropharynx clear and moist    Eyes: EOMs intact without nystagmus  No scleral icterus or injection  No discharge  Neck: Supple, normal ROM  No stridor noted  Cardiovascular: Regular rate  Pulmonary: No respiratory distress  Effort normal  No stridor or wheezing evident  Musculoskeletal: Normal ROM  No edema or deformities noted  Pain when elevating R Leg  Neurological: Detailed below  Skin: Warm and dry  No erythema or rashes  No jaundice  Psychiatric: Normal mood and affect, normal thought process/thought content  No hallucinations  Neurologic Exam:  Mental Status: Patient is awake and alert  Oriented to person, place, day of the week, month, year  Follows all commands without difficulty  No dysarthria  No aphasia- able to name and repeat (Only missed object to name was the face of the watch )     Cranial Nerves: Cranial nerves 2-12 intact       Motor: 5/5 strength throughout bilateral upper and lower extremities  Sensation: Sensation to light touch intact throughout  Coordination: Finger to nose testing normal      No tremors noted  Gait: Deferred       NIHSS:  1a Level of Consciousness: 0 = Alert   1b  LOC Questions: 0 = Answers both correctly   1c  LOC Commands: 0 = Obeys both correctly   2  Best Gaze: 0 = Normal   3  Visual: 0 = No visual field loss   4  Facial Palsy: 0=Normal symmetric movement   5a  Motor Right Arm: 0=No drift, limb holds 90 (or 45) degrees for full 10 seconds   5b  Motor Left Arm: 0=No drift, limb holds 90 (or 45) degrees for full 10 seconds   6a  Motor Right Le=No drift, limb holds 90 (or 45) degrees for full 10 seconds   6b  Motor Left Le=No drift, limb holds 90 (or 45) degrees for full 10 seconds   7  Limb Ataxia:  0=Absent   8  Sensory: 0=Normal; no sensory loss   9  Best Language:  0=No aphasia, normal   10  Dysarthria: 0=Normal articulation   11  Extinction and Inattention (formerly Neglect): 0=No abnormality   Total Score: 0     Time NIHSS was completed: 1250 pm    Modified Delfino Score: 2-3    Lab Results: I have personally reviewed pertinent reports  Imaging Studies: I have personally reviewed pertinent reports   , I have personally reviewed pertinent films in PACS and attending neurologist has personally reviewed pertinent films in PACS with a Radiologist   EKG, Pathology, and Other Studies: I have personally reviewed pertinent reports  VTE Prophylaxis: None- currently in ED    Code Status: Prior    Counseling / Coordination of Care  Critical care billing per attending  History and physical examination obtained by attending neurologist  AP in room as witness to history and physical examination and acted solely as a scribe for attending neurologist for this encounter  All medical decision making per attending

## 2022-06-02 VITALS
HEART RATE: 67 BPM | RESPIRATION RATE: 16 BRPM | BODY MASS INDEX: 19.7 KG/M2 | WEIGHT: 111.22 LBS | SYSTOLIC BLOOD PRESSURE: 141 MMHG | DIASTOLIC BLOOD PRESSURE: 58 MMHG | TEMPERATURE: 98.4 F | OXYGEN SATURATION: 95 %

## 2022-06-02 PROBLEM — E53.8 VITAMIN B12 DEFICIENCY: Status: ACTIVE | Noted: 2022-06-02

## 2022-06-02 LAB
ANION GAP SERPL CALCULATED.3IONS-SCNC: 4 MMOL/L (ref 4–13)
BUN SERPL-MCNC: 16 MG/DL (ref 5–25)
CALCIUM SERPL-MCNC: 8.8 MG/DL (ref 8.3–10.1)
CHLORIDE SERPL-SCNC: 112 MMOL/L (ref 100–108)
CHOLEST SERPL-MCNC: 170 MG/DL
CO2 SERPL-SCNC: 26 MMOL/L (ref 21–32)
CREAT SERPL-MCNC: 0.68 MG/DL (ref 0.6–1.3)
ERYTHROCYTE [DISTWIDTH] IN BLOOD BY AUTOMATED COUNT: 12.6 % (ref 11.6–15.1)
EST. AVERAGE GLUCOSE BLD GHB EST-MCNC: 100 MG/DL
GFR SERPL CREATININE-BSD FRML MDRD: 79 ML/MIN/1.73SQ M
GLUCOSE SERPL-MCNC: 72 MG/DL (ref 65–140)
HBA1C MFR BLD: 5.1 %
HCT VFR BLD AUTO: 35.6 % (ref 34.8–46.1)
HDLC SERPL-MCNC: 77 MG/DL
HGB BLD-MCNC: 12.2 G/DL (ref 11.5–15.4)
LDLC SERPL CALC-MCNC: 82 MG/DL (ref 0–100)
MCH RBC QN AUTO: 32.5 PG (ref 26.8–34.3)
MCHC RBC AUTO-ENTMCNC: 34.3 G/DL (ref 31.4–37.4)
MCV RBC AUTO: 95 FL (ref 82–98)
PLATELET # BLD AUTO: 204 THOUSANDS/UL (ref 149–390)
PMV BLD AUTO: 11.4 FL (ref 8.9–12.7)
POTASSIUM SERPL-SCNC: 3.9 MMOL/L (ref 3.5–5.3)
RBC # BLD AUTO: 3.75 MILLION/UL (ref 3.81–5.12)
SODIUM SERPL-SCNC: 142 MMOL/L (ref 136–145)
TRIGL SERPL-MCNC: 54 MG/DL
WBC # BLD AUTO: 7.24 THOUSAND/UL (ref 4.31–10.16)

## 2022-06-02 PROCEDURE — 80048 BASIC METABOLIC PNL TOTAL CA: CPT | Performed by: STUDENT IN AN ORGANIZED HEALTH CARE EDUCATION/TRAINING PROGRAM

## 2022-06-02 PROCEDURE — NC001 PR NO CHARGE: Performed by: INTERNAL MEDICINE

## 2022-06-02 PROCEDURE — 97162 PT EVAL MOD COMPLEX 30 MIN: CPT

## 2022-06-02 PROCEDURE — 99222 1ST HOSP IP/OBS MODERATE 55: CPT | Performed by: INTERNAL MEDICINE

## 2022-06-02 PROCEDURE — 80061 LIPID PANEL: CPT | Performed by: STUDENT IN AN ORGANIZED HEALTH CARE EDUCATION/TRAINING PROGRAM

## 2022-06-02 PROCEDURE — 85027 COMPLETE CBC AUTOMATED: CPT | Performed by: STUDENT IN AN ORGANIZED HEALTH CARE EDUCATION/TRAINING PROGRAM

## 2022-06-02 PROCEDURE — 83036 HEMOGLOBIN GLYCOSYLATED A1C: CPT | Performed by: STUDENT IN AN ORGANIZED HEALTH CARE EDUCATION/TRAINING PROGRAM

## 2022-06-02 PROCEDURE — 97166 OT EVAL MOD COMPLEX 45 MIN: CPT

## 2022-06-02 RX ORDER — PYRIDOXINE HCL (VITAMIN B6) 50 MG
150 TABLET ORAL DAILY
Qty: 45 TABLET | Refills: 0 | Status: SHIPPED | OUTPATIENT
Start: 2022-06-03 | End: 2022-07-03

## 2022-06-02 RX ORDER — CYANOCOBALAMIN 1000 UG/ML
1000 INJECTION INTRAMUSCULAR; SUBCUTANEOUS ONCE
Status: DISCONTINUED | OUTPATIENT
Start: 2022-06-02 | End: 2022-06-02 | Stop reason: HOSPADM

## 2022-06-02 RX ADMIN — METOPROLOL TARTRATE 25 MG: 25 TABLET, FILM COATED ORAL at 10:21

## 2022-06-02 RX ADMIN — PANTOPRAZOLE SODIUM 40 MG: 40 TABLET, DELAYED RELEASE ORAL at 05:31

## 2022-06-02 RX ADMIN — ENOXAPARIN SODIUM 40 MG: 40 INJECTION SUBCUTANEOUS at 10:21

## 2022-06-02 RX ADMIN — AMLODIPINE BESYLATE 5 MG: 5 TABLET ORAL at 10:21

## 2022-06-02 RX ADMIN — ASPIRIN 81 MG 81 MG: 81 TABLET ORAL at 10:21

## 2022-06-02 NOTE — ASSESSMENT & PLAN NOTE
Vitamin B12 302 in July 2021  Will give IM 1000 mcg B12  Start vitamin B12 150 mcg starting from tomorrow

## 2022-06-02 NOTE — DISCHARGE INSTR - AVS FIRST PAGE
Please follow up with your PCP within 5-7 days after discharge  Check your blood pressure 2 times a day at home and write it down in the log  Please bring it to your PCP appointment  Come back to ED if symptoms recur or worsen

## 2022-06-02 NOTE — OCCUPATIONAL THERAPY NOTE
Occupational Therapy Evaluation     Patient Name: Rey Clement  HVKLM'P Date: 6/2/2022  Problem List  Principal Problem:    Word finding difficulty  Active Problems:    Essential hypertension    GERD (gastroesophageal reflux disease)    Vitamin B12 deficiency    Past Medical History  Past Medical History:   Diagnosis Date    Allergic rhinitis     Anxiety     Diverticulitis of large intestine without perforation or abscess without bleeding     Dysthymic disorder     Gastro-esophageal reflux disease without esophagitis     HLD (hyperlipidemia)     Hypertension     Osteopenia     Palpitations     Paresthesia     Stroke [facility])      Past Surgical History  Past Surgical History:   Procedure Laterality Date    APPENDECTOMY      CHOLECYSTECTOMY      COLON SURGERY      FL INJECTION RIGHT HIP (NON ARTHROGRAM)  8/28/2019    HYSTERECTOMY               06/02/22 4952   OT Last Visit   OT Visit Date 06/02/22   Note Type   Note type Evaluation   Restrictions/Precautions   Weight Bearing Precautions Per Order No   Other Precautions Fall Risk;Pain;Cognitive;Hard of hearing   Pain Assessment   Pain Assessment Tool 0-10   Pain Score No Pain   Home Living   Type of Home House   Home Layout Two level;Stairs to enter with rails;Bed/bath upstairs  (1-2 JULIO)   Bathroom Shower/Tub Walk-in shower   Bathroom Toilet Standard   Bathroom Equipment Grab bars in shower;Grab bars around toilet;Commode; Shower chair   Bathroom Accessibility Accessible via walker   Home Equipment   (rollator)   Prior Function   Level of [place] Independent with ADLs and functional mobility; Needs assistance with IADLs   Lives With Family   Receives Help From Family   ADL Assistance Independent   IADLs Needs assistance   Falls in the last 6 months 0   Vocational Retired   Lifestyle   Autonomy Pt reports IADLS, needs assistance for IADLs, I functional mobility short distances >10 ft with rollator 2* chronic R hip pain   Reciprocal Relationships Pt lives with her supportive daughter who provides 24/7 supervison and assists with pts IADLs  Pt also lives with her son in law and 2 adult grandchildren   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   Intrinsic Gratification Pt enjoys painting, her cat, and spending time with her grandsons   Psychosocial   Psychosocial (WDL) WDL   ADL   Eating Assistance 6  Modified independent   Eating Deficit Setup   Grooming Assistance 6  Modified Independent   Grooming Deficit Setup   UB [address] 5  Supervision/Setup  (seated in [facility])   [address] [facility] 5  Supervision/Setup  (seated in [facility])   LB Bathing Deficit Setup;Supervision/safety;Verbal cueing; Increased time to complete;Right lower leg including foot; Left lower leg including foot   UB Dressing Assistance 5  Supervision/Setup   UB Dressing Deficit Setup   LB Dressing Assistance 4  Minimal Assistance   LB Dressing Deficit Don/doff R sock; Don/doff L sock  (Pt had difficulty problem solving with donning R sock as it got stuck on her toes and pt stated "its fine like this")   Toileting Assistance  5  Supervision/Setup   Toileting Deficit Supervison/safety;Setup;Verbal cueing   Functional Assistance 5  Supervision/Setup   Functional Deficit Setup;Verbal cueing;Supervision/safety   Bed Mobility   Supine to Sit Unable to assess   Sit to Supine Unable to assess   Additional Comments Pt greeted in recliner and returned upon conclusion with all needs within reach   Transfers   Sit to Stand 5  Supervision   Additional items Verbal cues   Stand to Sit 5  Supervision   Additional items Verbal cues   Functional Mobility   Functional Mobility 5  Supervision   Additional Comments Pt performed functional mobility a short distance to and from hallway door with RW and antalgic gait 2* chronic R hip pain  Pt limited by hip pain and her baseline is >10 ft w/ rollator     Additional items Rolling walker   Balance   Static Sitting Good   Dynamic Sitting Fair +   Static Standing Fair +   Dynamic Standing Fair   Ambulatory Fair   Activity Tolerance   Activity Tolerance Patient tolerated treatment well   Medical Staff Made Aware PT [place] due to the patient's co-morbidities, clinically unstable presentation, and present impairments which are a regression from the patient's baseline  Nurse Made Aware RN notified and cleared to see pt   RUE Assessment   RUE Assessment WFL  (gross 4+/5)   LUE Assessment   LUE Assessment WFL  (grossly 4+/5)   Hand Function   Gross Motor Coordination Functional   Fine Motor Coordination Functional   Sensation   Light Touch No apparent deficits   Vision-Basic Assessment   Current Vision Wears glasses all the time   Patient Visual Report   (pt denies diplopia and blurry vision)   Vision - Complex Assessment   Tracking   (tracked therapist around room)   Acuity Able to read employee name badge without difficulty; Able to read clock/calendar on wall without difficulty   Cognition   Overall Cognitive Status (S)  Impaired   Arousal/Participation Alert; Cooperative   Attention Attends with cues to redirect   Orientation Level Oriented X4   Memory Decreased recall of recent events;Decreased short term memory   Following Commands Follows multistep commands with increased time or repetition   Comments Pt has baseline mild cognitive impairment and has 24/7 supervision at home  Pt appears to have difficulty problem solving as she could not figure out how to don her R sock  Pt requires re-direction and repetition with multipstep commands  Pt speech was clear w/o evidence of word finding difficulty  Pt was plesant and cooperative   Assessment   Assessment Pt is a 81 y o  female who was admitted to [facility] on 6/1/2022 with Word finding difficulty, HTN, HLD, and mild cognitive impairment   Pt's  has a past medical history of Allergic rhinitis, Anxiety, Diverticulitis of large intestine without perforation or abscess without bleeding, Dysthymic disorder, Gastro-esophageal reflux disease without esophagitis, HLD (hyperlipidemia), Hypertension, Osteopenia, Palpitations, Paresthesia, and Stroke ([facility] [address] )    At baseline pt was completing ADLs independently  Pt lives in a 2 story home with her supportive family  Currently pt requires min A (LB) to supervision for overall ADLS and supervision w/ RW for functional mobility/transfers  Pt currently presents with impairments in the following categories -limited insight into deficits standing balance/tolerance, memory, safety  and sequencing   These impairments, as well as pt's pain and risk for falls  limit pt's ability to safely engage in all baseline areas of occupation, includingbathing, dressing and toileting  The patient's raw score on the AM-PAC Daily Activity inpatient short form is 20, standardized score is 42 03, greater than 39 4  Patients at this level are likely to benefit from discharge to home  Please refer to the recommendation of the Occupational Therapist for safe discharge planning  From OT standpoint, recommend home with continuted 24/7  upon D/C  No skilled OT needs at this time, will discharge from caseload  Please reach out if medical status changes or any questions or concerns arise     Goals   Patient Goals To go home   Recommendation   OT Discharge Recommendation No rehabilitation needs   OT - OK to Discharge Yes   AM-PAC Daily Activity Inpatient   Lower Body Dressing 3   Bathing 3   Toileting 3   Upper Body Dressing 3   Grooming 4   Eating 4   Daily Activity Raw Score 20   Daily Activity Standardized Score (Calc for Raw Score >=11) 42 03   AM-PAC Applied Cognition Inpatient   Following a Speech/Presentation 2   Understanding Ordinary Conversation 4   Taking Medications 3   Remembering Where Things Are Placed or Put Away 3   Remembering List of 4-5 Errands 2   Taking Care of Complicated Tasks 1   Applied Cognition Raw Score 15   Applied Cognition Standardized Score 33 54

## 2022-06-02 NOTE — PHYSICAL THERAPY NOTE
Physical Therapy Evaluation and Discharge    Patient's Name: James Segundo    Admitting Diagnosis  Dizziness [R42]  Stroke-like symptoms [R29 90]    Problem List  Patient Active Problem List   Diagnosis    Dizziness    Chest tightness    Essential hypertension    GERD (gastroesophageal reflux disease)    Chronic idiopathic constipation    Perianal lesion    Word finding difficulty    Mild cognitive impairment    Diverticulitis large intestine w/o perforation or abscess w/o bleeding    Screening for colon cancer    Small vessel stroke ([facility] [address] )    Mild protein-calorie malnutrition ([facility] [address] )       Past Medical History  Past Medical History:   Diagnosis Date    Allergic rhinitis     Anxiety     Diverticulitis of large intestine without perforation or abscess without bleeding     Dysthymic disorder     Gastro-esophageal reflux disease without esophagitis     HLD (hyperlipidemia)     Hypertension     Osteopenia     Palpitations     Paresthesia     Stroke [facility])        Past Surgical History  Past Surgical History:   Procedure Laterality Date    APPENDECTOMY      CHOLECYSTECTOMY      COLON SURGERY      FL INJECTION RIGHT HIP (NON ARTHROGRAM)  8/28/2019    HYSTERECTOMY          06/02/22 0840   PT Last Visit   PT Visit Date 06/02/22   Note Type   Note type Evaluation   Pain Assessment   Pain Assessment Tool FLACC   Pain Location/Orientation Orientation: Right;Location: Hip  (chronic right hip pain)   Pain Rating: FLACC (Rest) - Face 0   Pain Rating: FLACC (Rest) - Legs 0   Pain Rating: FLACC (Rest) - Activity 0   Pain Rating: FLACC (Rest) - Cry 0   Pain Rating: FLACC (Rest) - Consolability 0   Score: FLACC (Rest) 0   Pain Rating: FLACC (Activity) - Face 1   Pain Rating: FLACC (Activity) - Legs 0   Pain Rating: FLACC (Activity) - Activity 0   Pain Rating: FLACC (Activity) - Cry 0   Pain Rating: FLACC (Activity) - Consolability 0   Score: FLACC (Activity) 1   Restrictions/Precautions   Weight Bearing Precautions Per Order No   Braces or Orthoses   (none)   Other Precautions Fall Risk;Pain   Home Living   Type of [facility] Two level;Stairs to enter with rails  (1 julio)   Home Equipment Other (Comment)  (rollator)   Additional Comments Prior to this admission, patient resided with her daughter (home all of the time), son in law, and 2 adult grandsons in a 2 level home (1 JULIO and stair glide to 2nd floor)  At her baseline patient reports using rollator for short distances (approx 10 feet) unassisted  Family assists with ADLs and iADLS  Denies falls  Prior Function   Level of [place] Independent with ADLs and functional mobility   Lives With Family   Receives Help From Family   ADL Assistance Needs assistance   IADLs Needs assistance   Falls in the last 6 months 0   Vocational Retired   General   Additional Pertinent History 81year old female admitted to [facility]B on 6/1/2022 with AMS and word finding difficulty  CT head and CTA head/neck (-) for acute abnormalities  Per neurology consultation: "more suspicious for HTN encephalopathy at this time"     Family/Caregiver Present No   Cognition   Overall Cognitive Status Impaired   Arousal/Participation Alert   Orientation Level Oriented to person;Oriented to place;Oriented to situation   Memory Decreased recall of recent events   Following Commands Follows multistep commands with increased time or repetition   Comments has mild congitive impairment at baseline, admits she becomes nervous and will "lose her words"   Subjective   Subjective "I don't want to walk out there"   RUE Assessment   RUE Assessment WFL   LUE Assessment   LUE Assessment WFL   RLE Assessment   RLE Assessment WFL   LLE Assessment   LLE Assessment WFL   Bed Mobility   Supine to Sit Unable to assess   Sit to Supine Unable to assess   Additional Comments patient in chair pre and post PT eval   Transfers   Sit to Stand 5  Supervision   Stand to Sit 5  Supervision   Ambulation/Elevation   Gait pattern Excessively slow;Short stride; Antalgic   Gait Assistance 5  Supervision   Assistive Device Rolling walker   Distance 5 feet x 2   Ambulation/Elevation Additional Comments With use of RW she ambulated 5 feet x 2 (patient declined to ambulate in hallway and reports this is a normal distance for her at home)  Her gait speed is reduced and antalgic secondary to chronic R hip pain  Balance   Static Sitting Good   Static Standing Fair   Ambulatory Fair   Endurance Deficit   Endurance Deficit Yes   Endurance Deficit Description chronic hip pain (R)   Activity Tolerance   Activity Tolerance Patient tolerated treatment well   Medical Staff Made Aware This moderate complexity evaluation was performed with an occupational therapist due to the patient's co-morbidities and emerging status  Nurse Made Aware deshawn to see per RN Edda   Assessment   Prognosis Good   Problem List Decreased endurance;Decreased mobility;Pain   Assessment PT completed evaluation of 81year old female admitted to [facility] on 6/1/2022 with AMS and word finding difficulty  CT head and CTA head/neck (-) for acute abnormalities  Per neurology consultation: "more suspicious for HTN encephalopathy at this time"  Current emerging status includes continuous O2/HR monitoring, pain (R hip), and falls ris  PMH is significant for HTN, anxiety, mild cognitive impairment, and TIA  Prior to this admission, patient resided with her daughter (home all of the time), son in law, and 2 adult grandsons in a 2 level home (1 JULIO and stair glide to 2nd floor)  At her baseline patient reports using rollator for short distances (approx 10 feet) unassisted  Family assists with ADLs and iADLS  Denies falls  Current impairments include pain, decreased activity tolerance, and gait deviations  During PT evaluation patient was able to perform sit<-->stand transfers and ambulation S level   With use of RW she ambulated 5 feet x 2 (patient declined to ambulate in hallway and reports this is a normal distance for her at home)  Her gait speed is reduced and antalgic secondary to chronic R hip pain  I anticipate this patient is functioning at her baseline and PT d/c recommendation is for return home with ongoing assist from family PRN  D/C inpt PT  Recommend continued mobility with RN staff  Goals   Patient Goals to go home   PT Treatment Day 0   Plan   PT Frequency (Other (Comment)  (d/c inpt PT)   Recommendation   PT Discharge Recommendation No rehabilitation needs  (home w/ family)   Equipment Recommended   (has rollator)   [address] in Bed Without Bedrails 4   Lying on Back to Sitting on Edge of Flat Bed 4   Moving Bed to Chair 4   Standing Up From Chair 4   Walk in Room 3   Climb 3-5 Stairs 3   Basic Mobility Inpatient Raw Score 22   Basic Mobility Standardized Score 47 4   Highest Level Of Mobility   -[facility] Goal 7: Walk 25 feet or more     The patient's AM-PAC Basic Mobility Inpatient Standardized Score is greater than 42 9, suggesting this patient may benefit from discharge to home  Please also refer to the recommendation of the Physical Therapist for safe discharge planning        Catie Saab, PT, DPT

## 2022-06-02 NOTE — DISCHARGE SUMMARY
INTERNAL MEDICINE RESIDENCY DISCHARGE SUMMARY     Ciro Cuellar   81 y o  female  MRN: [id number]  Room/Bed: [place] [address]/[facility] [address] [place] [address]   Encounter: [id number]    Principal Problem:    Word finding difficulty  Active Problems:    Essential hypertension    GERD (gastroesophageal reflux disease)    Vitamin B12 deficiency      * Word finding difficulty  Assessment & Plan  Patient presents to ED with daughter for concerns of altered mental status and word-finding difficulty  Approximately 9:30 a m  off family noted a change with transient difficulty in speech as patient could not find the correct words that she wanted to say  No other neurologic deficits at the time  This is similar presentation to July of 2021 when patient presented with word-finding difficulty and significant hypertension of 204/99  Patient was diagnosed with TIA initiated on aspirin at that time  NIHSS 0 and symptoms improved  BP was elevated to 198/82  · Stroke alert upon arrival to ED  · CT/CTA negative for acute intracranial abnormality  Negative for large vessel occlusion, dissection, aneurysm, or high-grade stenosis  Chronic microangiopathic disease noted  · Neurology consulted, their recommendations are appreciated  · Process is more concerning for hypertensive encephalopathy at this time  · Neurology consulted  · Low suspicion for TIA  Most likely hypertensive encephalopathy  · Will defer MRI and Echo at this time  · Continue aspirin 81 mg daily  · Continue atorvastatin 40 mg daily  · HbA1c 5 1 Lipid panel showed HDL 77, LDL 82, TG 54  · Monitor BP  Goal normotensive  PRN labetalol ordered  · BP improved to 141/58  · Continue home med: amlodipine 5 mg and lopressor 25 mg bid   Follow up with PCP in 1 week with BP log         Vitamin B12 deficiency  Assessment & Plan  Vitamin B12 302 in July 2021  Will give IM 1000 mcg B12  Start vitamin B12 150 mcg starting from tomorrow    GERD (gastroesophageal reflux disease)  Assessment & Plan  Patient with history of GERD  · Outpatient medication omeprazole   · Continue pantoprazole while inpatient    Essential hypertension  Assessment & Plan  Patient with history of hypertension  Systolic BP 465D upon arrival to ED  Improved to 130  · Continue home amlodipine 5 mg daily  · Continue home metoprolol tartrate 25 mg BID  · PRN labetalol   · Monitor BP      [address]   H&P per Dr Jonelle Helms on 6/1/22  Ms Caitlin Pham is an 41-year-old female with a past medical history of  HTN, HLD, and anxiety that presents to the ED today with her daughter for concerns of altered mental status and word-finding difficulty  Patient lives at home with her daughter  Daughter provided most of the history upon arrival to ED, however she was not present during my encounter  Rest of history obtained from patient and through chart review  Patient woke up this morning in normal state of health, but around 9:30 a m  family noted a change in her behavior  She was having difficulty speaking and they describe did as if she was not able to find the correct word that she was looking for  Per daughter, she was unable to find the correct words to answer her name, date, and current president  No other focal neurologic deficits were noted at this time  Her daughter, this is a similar presentation to of prior events in July of 2021  At that time patient presented with word-finding difficulty in setting of significant hypertension with systolic ER in 788Q  CT and CTA at that time was negative  Patient was started on aspirin with concern for a possible TIA at that time      Upon arrival to the emergency department, stroke alert was called  NIHSS was 0  Patient clinically improved  A CT and CTA was negative for any acute intracranial abnormalities her though chronic microangiopathic disease was noted    Patient presently denied any fever, chills, nausea, vomiting, diarrhea, constipation, chest pain, shortness a breath  Patient states that she feels like her word-finding difficulties much improved and she is able to speak without any evidence of word-finding difficulty  BP is elevated to 198/82 in ED  Patient was evaluated by Neurology and her symptoms thought to be related to hypertensive encephalopathy  Patient blood pressure improved with her home dose of amlodipine 5 mg and Lopressor 25 b i d  Patient was found to have B12 deficiency upon reviewing medical record  Will give IM B12 1000 mcg before discharge  Patient is agreeable with above plan  Patient was advised to follow-up with PCP within 5-7 days with blood pressure log  Daughter Yue Hart was notified and agree with above plans  Physical Exam  Vitals and nursing note reviewed  Constitutional:       General: She is not in acute distress  Appearance: Normal appearance  She is normal weight  She is not ill-appearing, toxic-appearing or diaphoretic  Comments: Very pleasant elderly lady sitting comfortably in the chair   HENT:      Head: Normocephalic and atraumatic  Nose: Nose normal       Mouth/Throat:      Mouth: Mucous membranes are moist       Pharynx: Oropharynx is clear  Eyes:      General: No scleral icterus  Extraocular Movements: Extraocular movements intact  Conjunctiva/sclera: Conjunctivae normal    Cardiovascular:      Rate and Rhythm: Normal rate and regular rhythm  Pulses: Normal pulses  Heart sounds: Normal heart sounds  No murmur heard  Pulmonary:      Effort: Pulmonary effort is normal  No respiratory distress  Breath sounds: Normal breath sounds  No stridor  No wheezing  Abdominal:      General: Bowel sounds are normal  There is no distension  Palpations: Abdomen is soft  Tenderness: There is no abdominal tenderness  There is no guarding  Musculoskeletal:         General: Normal range of motion        Cervical back: Normal range of motion and neck supple  Right lower leg: No edema  Left lower leg: No edema  Skin:     General: Skin is warm  Capillary Refill: Capillary refill takes less than 2 seconds  Neurological:      General: No focal deficit present  Mental Status: She is alert and oriented to person, place, and time  Psychiatric:         Mood and Affect: Mood normal          Behavior: Behavior normal            DISCHARGE INFORMATION     PCP at Discharge: Paula Loco MD    Admitting Provider: Deyanira Mcginnis MD  Admission Date: 6/1/2022    Discharge Provider: Deyanira Mcginnis MD  Discharge Date: 6/2/22    Discharge Disposition: Home/Self Care  Discharge Condition: good  Discharge with Lines: no    Discharge Diet: cardiac diet and Low-sodium diet  Activity Restrictions: none  Test Results Pending at Discharge: None    Discharge Diagnoses:  Principal Problem:    Word finding difficulty  Active Problems:    Essential hypertension    GERD (gastroesophageal reflux disease)    Vitamin B12 deficiency  Resolved Problems:    * No resolved hospital problems  *      Consulting Providers:      Diagnostic & Therapeutic Procedures Performed:  CT stroke alert brain    Result Date: 6/1/2022  Impression: No acute intracranial abnormality  Mild chronic microangiopathy  Findings were directly discussed with Adri Enriquez at 12:54 PM  Workstation performed: [id number]     CTA stroke alert (head/neck)    Result Date: 6/1/2022  Impression: Negative CTA head and neck for large vessel occlusion, dissection, aneurysm, or high-grade stenosis  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  Additional chronic/incidental findings as detailed above   Findings were directly discussed with Adri Enriquez at 12:54 PM  Workstation performed: [id number]       Code Status: Level 1 - Full Code  Advance Directive & Living Will: <no information>  Power of :    POLST:      Medications:  Current Discharge Medication List        Current Discharge Medication List      START taking these medications    Details   cyanocobalamin (CYANOCOBALAMIN) 100 MCG TABS Take 1 5 tablets (150 mcg total) by mouth daily  Qty: 45 tablet, Refills: 0    Associated Diagnoses: Vitamin B12 deficiency           Current Discharge Medication List      CONTINUE these medications which have NOT CHANGED    Details   aspirin 81 mg chewable tablet Chew 1 tablet (81 mg total) daily  Qty: 30 tablet, Refills: 0    Associated Diagnoses: TIA (transient ischemic attack); Word finding difficulty      atorvastatin (LIPITOR) 40 mg tablet TAKE ONE TABLET BY MOUTH ONE TIME DAILY IN THE EVENING   Qty: 30 tablet, Refills: 0    Associated Diagnoses: TIA (transient ischemic attack);  Word finding difficulty      latanoprost (XALATAN) 0 005 % ophthalmic solution       LORazepam (ATIVAN) 0 5 mg tablet Take 1 tablet (0 5 mg total) by mouth 2 (two) times a day as needed for anxiety  Qty: 60 tablet, Refills: 0    Associated Diagnoses: Anxiety attack      metoprolol tartrate (LOPRESSOR) 25 mg tablet Take 1 tablet (25 mg total) by mouth 2 (two) times a day  Qty: 180 tablet, Refills: 0    Associated Diagnoses: Essential hypertension      omeprazole (PriLOSEC) 20 mg delayed release capsule Take 1 capsule (20 mg total) by mouth daily  Qty: 90 capsule, Refills: 0    Associated Diagnoses: Gastroesophageal reflux disease without esophagitis      amLODIPine (NORVASC) 5 mg tablet Take 1 tablet (5 mg total) by mouth daily  Qty: 90 tablet, Refills: 2    Associated Diagnoses: Essential hypertension      meloxicam (MOBIC) 7 5 mg tablet Take 1 tablet (7 5 mg total) by mouth daily  Qty: 30 tablet, Refills: 0    Associated Diagnoses: Primary osteoarthritis of one hip, right      ondansetron (Zofran ODT) 4 mg disintegrating tablet Take 1 tablet (4 mg total) by mouth every 6 (six) hours as needed for nausea or vomiting  Qty: 20 tablet, Refills: 0    Associated Diagnoses: Nausea Allergies:  No Known Allergies    FOLLOW-UP     PCP Outpatient Follow-up:  Follow-up with Dr Ronny Resendiz in the 5-7 days    Consulting Providers Follow-up:  none required     Active Issues Requiring Follow-up:   Hypertension    Discharge Statement:   I spent 45 minutes minutes discharging the patient  This time was spent on the day of discharge  I had direct contact with the patient on the day of discharge  Additional documentation is required if more than 30 minutes were spent on discharge  Portions of the record may have been created with voice recognition software  Occasional wrong word or "sound a like" substitutions may have occurred due to the inherent limitations of voice recognition software    Read the chart carefully and recognize, using context, where substitutions have occurred     ==  Kvng Mcguire MD  [address]  Internal Medicine Resident PGY-2

## 2022-06-02 NOTE — CASE MANAGEMENT
Case Management Discharge Planning Note    Patient name Ana Cummins  [facility] [address] [id number]  : 1935 Date 2022       Current Admission Date: 2022  Current Admission Diagnosis:Word finding difficulty   Patient Active Problem List    Diagnosis Date Noted    Vitamin B12 deficiency 2022    Mild protein-calorie malnutrition ([facility] [address] ) 2021    Diverticulitis large intestine w/o perforation or abscess w/o bleeding 11/10/2021    Screening for colon cancer 11/10/2021    Small vessel stroke ([facility] [address] ) 11/10/2021    Mild cognitive impairment 2021    Word finding difficulty 2021    Perianal lesion 2021    Chronic idiopathic constipation 10/28/2020    Dizziness 2019    Chest tightness 2019    Essential hypertension 2019    GERD (gastroesophageal reflux disease) 2019      LOS (days): 1  Geometric Mean LOS (GMLOS) (days): 2 00  Days to GMLOS:0 9     OBJECTIVE:  Risk of Unplanned Readmission Score: 7 52         Current admission status: Inpatient   Preferred Pharmacy:   [facility] [id number] [place] [address]  Phone: [phone] Fax: [phone]    Primary Care Provider: Yary Bill MD    Primary Insurance: MEDICARE  Secondary Insurance: [employer]    DISCHARGE DETAILS:     Additional Comments: Pt was d/c before CM was able to complete an open   CM understood pt had no needs, unaware of d/c

## 2022-06-03 ENCOUNTER — TRANSITIONAL CARE MANAGEMENT (OUTPATIENT)
Dept: INTERNAL MEDICINE CLINIC | Facility: OTHER | Age: 87
End: 2022-06-03

## 2022-06-05 NOTE — ED PROVIDER NOTES
History  Chief Complaint   Patient presents with    Weakness - Generalized     Pt brought in from home for weakness and dizziness; per EMS, family reported alt mental status lasting 30 minutes earlier and a hx TIAs       History provided by:  Patient and relative   used: No    Dizziness  Quality:  Lightheadedness  Severity:  Moderate  Onset quality:  Sudden  Timing:  Constant  Progression:  Unchanged  Chronicity:  New  Context: not when bending over, not with bowel movement, not with ear pain, not with eye movement, not with head movement, not with inactivity, not with loss of consciousness, not with medication, not with physical activity, not when standing up and not when urinating    Relieved by:  Nothing  Worsened by:  Nothing  Ineffective treatments:  None tried  Associated symptoms: weakness    Associated symptoms: no blood in stool, no chest pain, no diarrhea, no headaches, no hearing loss, no nausea, no palpitations, no shortness of breath, no syncope, no tinnitus, no vision changes and no vomiting    Associated symptoms comment:  Word-finding difficulties  Risk factors: hx of stroke    Risk factors: no anemia, no heart disease, no hx of vertigo, no Meniere's disease, no multiple medications and no new medications        Prior to Admission Medications   Prescriptions Last Dose Informant Patient Reported? Taking?    LORazepam (ATIVAN) 0 5 mg tablet 5/31/2022 at Unknown time  No Yes   Sig: Take 1 tablet (0 5 mg total) by mouth 2 (two) times a day as needed for anxiety   amLODIPine (NORVASC) 5 mg tablet  Self No No   Sig: Take 1 tablet (5 mg total) by mouth daily   aspirin 81 mg chewable tablet 6/1/2022 at Unknown time Self No Yes   Sig: Chew 1 tablet (81 mg total) daily   atorvastatin (LIPITOR) 40 mg tablet 6/1/2022 at Unknown time Self No Yes   Sig: TAKE ONE TABLET BY MOUTH ONE TIME DAILY IN THE EVENING    latanoprost (XALATAN) 0 005 % ophthalmic solution 5/31/2022 at Unknown time Self Yes Yes   meloxicam (MOBIC) 7 5 mg tablet Unknown at Unknown time Self No No   Sig: Take 1 tablet (7 5 mg total) by mouth daily   metoprolol tartrate (LOPRESSOR) 25 mg tablet 6/1/2022 at Unknown time  No Yes   Sig: Take 1 tablet (25 mg total) by mouth 2 (two) times a day   omeprazole (PriLOSEC) 20 mg delayed release capsule 6/1/2022 at Unknown time Self No Yes   Sig: Take 1 capsule (20 mg total) by mouth daily   ondansetron (Zofran ODT) 4 mg disintegrating tablet Not Taking at Unknown time Self No No   Sig: Take 1 tablet (4 mg total) by mouth every 6 (six) hours as needed for nausea or vomiting   Patient not taking: Reported on 6/1/2022      Facility-Administered Medications: None       Past Medical History:   Diagnosis Date    Allergic rhinitis     Anxiety     Diverticulitis of large intestine without perforation or abscess without bleeding     Dysthymic disorder     Gastro-esophageal reflux disease without esophagitis     HLD (hyperlipidemia)     Hypertension     Osteopenia     Palpitations     Paresthesia     Stroke [facility])        Past Surgical History:   Procedure Laterality Date    APPENDECTOMY      CHOLECYSTECTOMY      COLON SURGERY      FL INJECTION RIGHT HIP (NON ARTHROGRAM)  8/28/2019    HYSTERECTOMY         Family History   Problem Relation Age of Onset    Hypertension Mother     Hypertension Father      I have reviewed and agree with the history as documented  E-Cigarette/Vaping    E-Cigarette Use Never User      E-Cigarette/Vaping Substances    Nicotine No     THC No     CBD No     Flavoring No     Other No     Unknown No      Social History     Tobacco Use    Smoking status: Former Smoker    Smokeless tobacco: Never Used   Vaping Use    Vaping Use: Never used   Substance Use Topics    Alcohol use: Not Currently    Drug use: Not Currently       Review of Systems   Constitutional: Negative for activity change, chills, fatigue and fever     HENT: Negative for hearing loss, sore throat, tinnitus, trouble swallowing and voice change  Eyes: Negative for pain and visual disturbance  Respiratory: Negative for choking, shortness of breath and wheezing  Cardiovascular: Negative for chest pain, palpitations, leg swelling and syncope  Gastrointestinal: Negative for abdominal distention, abdominal pain, blood in stool, diarrhea, nausea and vomiting  Endocrine: Negative for polydipsia and polyuria  Genitourinary: Negative for difficulty urinating, dysuria and flank pain  Musculoskeletal: Negative for neck stiffness  Skin: Negative for color change and rash  Neurological: Positive for dizziness, speech difficulty and weakness  Negative for headaches  Hematological: Does not bruise/bleed easily  Psychiatric/Behavioral: Negative for agitation, behavioral problems, hallucinations and suicidal ideas  Physical Exam  Physical Exam  HENT:      Head: Normocephalic and atraumatic  Eyes:      Conjunctiva/sclera: Conjunctivae normal       Pupils: Pupils are equal, round, and reactive to light  Cardiovascular:      Rate and Rhythm: Normal rate and regular rhythm  Heart sounds: No murmur heard  Pulmonary:      Effort: Pulmonary effort is normal  No respiratory distress  Breath sounds: Normal breath sounds  Abdominal:      General: Bowel sounds are normal  There is no distension  Palpations: Abdomen is soft  Tenderness: There is no abdominal tenderness  Comments: No Signs of Peritonitis    Musculoskeletal:         General: Normal range of motion  Cervical back: Normal range of motion and neck supple  Skin:     General: Skin is warm and dry  Capillary Refill: Capillary refill takes less than 2 seconds  Coloration: Skin is not pale  Findings: No rash  Neurological:      Mental Status: She is alert and oriented to person, place, and time  GCS: GCS eye subscore is 4  GCS verbal subscore is 5  GCS motor subscore is 6  Comments: Patient with intermittent word-finding difficulties    No lateralizing deficits no limb ataxia   Psychiatric:         Behavior: Behavior normal          Vital Signs  ED Triage Vitals   Temperature Pulse Respirations Blood Pressure SpO2   06/01/22 1123 06/01/22 1123 06/01/22 1123 06/01/22 1123 06/01/22 1123   97 8 °F (36 6 °C) 83 18 165/74 95 %      Temp Source Heart Rate Source Patient Position - Orthostatic VS BP Location FiO2 (%)   06/01/22 1123 06/01/22 1123 06/01/22 1123 06/01/22 1123 --   Oral Monitor Lying Left arm       Pain Score       06/01/22 2246       No Pain           Vitals:    06/02/22 0432 06/02/22 0746 06/02/22 1021 06/02/22 1225   BP: 141/64 149/73 (!) 176/84 141/58   Pulse:  87 95 67   Patient Position - Orthostatic VS:  Sitting           Visual Acuity  Visual Acuity    Flowsheet Row Most Recent Value   L Pupil Size (mm) 2   R Pupil Size (mm) 2          ED Medications  Medications   iohexol (OMNIPAQUE) 350 MG/ML injection (SINGLE-DOSE) 65 mL (65 mL Intravenous Given 6/1/22 1241)       Diagnostic Studies  Results Reviewed     Procedure Component Value Units Date/Time    Lipid Panel with Direct LDL reflex [[id number]]  (Normal) Collected: 06/02/22 0437    Lab Status: Final result Specimen: Blood from Arm, Right Updated: 06/02/22 0646     Cholesterol 170 mg/dL      Triglycerides 54 mg/dL      HDL, Direct 77 mg/dL      LDL Calculated 82 mg/dL     Basic metabolic panel [[id number]]  (Abnormal) Collected: 06/02/22 0437    Lab Status: Final result Specimen: Blood from Arm, Right Updated: 06/02/22 0646     Sodium 142 mmol/L      Potassium 3 9 mmol/L      Chloride 112 mmol/L      CO2 26 mmol/L      ANION GAP 4 mmol/L      BUN 16 mg/dL      Creatinine 0 68 mg/dL      Glucose 72 mg/dL      Calcium 8 8 mg/dL      eGFR 79 ml/min/1 73sq m     Narrative:      [place] guidelines for Chronic Kidney Disease (CKD):     Stage 1 with normal or high GFR (GFR > 90 mL/min/1 73 square meters)    Stage 2 Mild CKD (GFR = 60-89 mL/min/1 73 square meters)    Stage 3A Moderate CKD (GFR = 45-59 mL/min/1 73 square meters)    Stage 3B Moderate CKD (GFR = 30-44 mL/min/1 73 square meters)    Stage 4 Severe CKD (GFR = 15-29 mL/min/1 73 square meters)    Stage 5 End Stage CKD (GFR <15 mL/min/1 73 square meters)  Note: GFR calculation is accurate only with a steady state creatinine    CBC (With Platelets) [[id number]]  (Abnormal) Collected: 06/02/22 0437    Lab Status: Final result Specimen: Blood from Arm, Right Updated: 06/02/22 0633     WBC 7 24 Thousand/uL      RBC 3 75 Million/uL      Hemoglobin 12 2 g/dL      Hematocrit 35 6 %      MCV 95 fL      MCH 32 5 pg      MCHC 34 3 g/dL      RDW 12 6 %      Platelets 981 Thousands/uL      MPV 11 4 fL     Hemoglobin A1c w/EAG Estimation [[id number]] Collected: 06/02/22 0437    Lab Status: Final result Specimen: Blood from Arm, Right Updated: 06/02/22 0627     Hemoglobin A1C 5 1 %       mg/dl     HS Troponin I 4hr [[id number]]  (Normal) Collected: 06/01/22 1905    Lab Status: Final result Specimen: Blood from Arm, Right Updated: 06/01/22 1938     hs TnI 4hr 3 ng/L      Delta 4hr hsTnI 0 ng/L     COVID/FLU/RSV - 2 hour TAT [[id number]]  (Normal) Collected: 06/01/22 1552    Lab Status: Final result Specimen: Nares from Nose Updated: 06/01/22 1651     SARS-CoV-2 Negative     INFLUENZA A PCR Negative     INFLUENZA B PCR Negative     RSV PCR Negative    Narrative:      FOR PEDIATRIC PATIENTS - copy/paste COVID Guidelines URL to browser: [email or web address]  [email or web address]    SARS-CoV-2 assay is a Nucleic Acid Amplification assay intended for the  qualitative detection of nucleic acid from SARS-CoV-2 in nasopharyngeal  swabs  Results are for the presumptive identification of SARS-CoV-2 RNA      Positive results are indicative of infection with SARS-CoV-2, the virus  causing COVID-19, but do not rule out bacterial infection or co-infection  with other viruses  Laboratories within the [place] and its  territories are required to report all positive results to the appropriate  public health authorities  Negative results do not preclude SARS-CoV-2  infection and should not be used as the sole basis for treatment or other  patient management decisions  Negative results must be combined with  clinical observations, patient history, and epidemiological information  This test has not been FDA cleared or approved  This test has been authorized by FDA under an Emergency Use Authorization  (EUA)  This test is only authorized for the duration of time the  declaration that circumstances exist justifying the authorization of the  emergency use of an in vitro diagnostic tests for detection of SARS-CoV-2  virus and/or diagnosis of COVID-19 infection under section 564(b)(1) of  the Act, 21 U  S C  593WYK-7(F)(8), unless the authorization is terminated  or revoked sooner  The test has been validated but independent review by FDA  and CLIA is pending  Test performed using [employer] Gene[employer]pert: This RT-PCR assay targets N2,  a region unique to SARS-CoV-2  A conserved region in the E-gene was chosen  for pan-Sarbecovirus detection which includes SARS-CoV-2      HS Troponin I 2hr [[id number]]  (Normal) Collected: 06/01/22 1552    Lab Status: Final result Specimen: Blood from Arm, Right Updated: 06/01/22 1641     hs TnI 2hr 3 ng/L      Delta 2hr hsTnI 0 ng/L     HS Troponin 0hr (reflex protocol) [[id number]]  (Normal) Collected: 06/01/22 1233    Lab Status: Final result Specimen: Blood from Arm, Right Updated: 06/01/22 1326     hs TnI 0hr 3 ng/L     Protime-INR [[id number]]  (Normal) Collected: 06/01/22 1233    Lab Status: Final result Specimen: Blood from Arm, Right Updated: 06/01/22 1312     Protime 13 2 seconds      INR 1 04    APTT [[id number]]  (Normal) Collected: 06/01/22 1233    Lab Status: Final result Specimen: Blood from Arm, Right Updated: 06/01/22 1312     PTT 30 seconds     Basic metabolic panel [[id number]]  (Abnormal) Collected: 06/01/22 1233    Lab Status: Final result Specimen: Blood from Arm, Right Updated: 06/01/22 1310     Sodium 141 mmol/L      Potassium 4 4 mmol/L      Chloride 109 mmol/L      CO2 28 mmol/L      ANION GAP 4 mmol/L      BUN 18 mg/dL      Creatinine 0 97 mg/dL      Glucose 90 mg/dL      Calcium 10 0 mg/dL      eGFR 53 ml/min/1 73sq m     Narrative:      [place] guidelines for Chronic Kidney Disease (CKD):     Stage 1 with normal or high GFR (GFR > 90 mL/min/1 73 square meters)    Stage 2 Mild CKD (GFR = 60-89 mL/min/1 73 square meters)    Stage 3A Moderate CKD (GFR = 45-59 mL/min/1 73 square meters)    Stage 3B Moderate CKD (GFR = 30-44 mL/min/1 73 square meters)    Stage 4 Severe CKD (GFR = 15-29 mL/min/1 73 square meters)    Stage 5 End Stage CKD (GFR <15 mL/min/1 73 square meters)  Note: GFR calculation is accurate only with a steady state creatinine    CBC and Platelet [[id number]]  (Normal) Collected: 06/01/22 1233    Lab Status: Final result Specimen: Blood from Arm, Right Updated: 06/01/22 1250     WBC 7 86 Thousand/uL      RBC 4 22 Million/uL      Hemoglobin 13 7 g/dL      Hematocrit 40 5 %      MCV 96 fL      MCH 32 5 pg      MCHC 33 8 g/dL      RDW 12 6 %      Platelets 019 Thousands/uL      MPV 10 8 fL     Fingerstick Glucose (POCT) [[id number]]  (Normal) Collected: 06/01/22 1230    Lab Status: Final result Updated: 06/01/22 1232     POC Glucose 99 mg/dl                  CTA stroke alert (head/neck)   Final Result by Anthony Carpio MD (06/01 1310)      Negative CTA head and neck for large vessel occlusion, dissection, aneurysm, or high-grade stenosis  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  Additional chronic/incidental findings as detailed above        Findings were directly discussed with Lilliam Walker at 12:54 PM  Workstation performed: [id number]         CT stroke alert brain   Final Result by Jevon Corey MD (06/01 1255)      No acute intracranial abnormality  Mild chronic microangiopathy  Findings were directly discussed with Ang Mendieta at 12:54 PM       Workstation performed: [id number]                    Procedures  Procedures         ED Course          case discussed with neurology NIH stroke scale is 0 at this time deficits not disabling not tPA candidate  MDM  Number of Diagnoses or Management Options  Stroke-like symptoms: new and requires workup  Diagnosis management comments: Patient presents with abrupt onset weakness dizziness word-finding difficulties concern for possible stroke syndrome versus TIA  Patient's daughter states that patient has had similar symptoms in past   Patient activated stroke alert         Amount and/or Complexity of Data Reviewed  Clinical lab tests: reviewed and ordered  Tests in the radiology section of CPT®: ordered and reviewed  Tests in the medicine section of CPT®: ordered and reviewed  Discuss the patient with other providers: yes  Independent visualization of images, tracings, or specimens: yes    Risk of Complications, Morbidity, and/or Mortality  Presenting problems: high  Diagnostic procedures: high  Management options: moderate    Patient Progress  Patient progress: stable      Disposition  Final diagnoses:   Stroke-like symptoms     Time reflects when diagnosis was documented in both MDM as applicable and the Disposition within this note     Time User Action Codes Description Comment    6/1/2022 12:27 PM Fer Sole Add [R29 90] Stroke-like symptoms     6/2/2022  1:02 PM Kvng Solis Ei Add [E53 8] Vitamin B12 deficiency       ED Disposition     ED Disposition   Admit    Condition   Stable    Date/Time   Wed Jun 1, 2022  1:20 PM    Comment   Case was discussed with SOD  and the patient's admission status was agreed to be Admission Status: inpatient status to the service of Dr Joshua Valencia              Follow-up Information     Follow up With Specialties Details Why Contact Info    Cole Saleem MD Internal Medicine Schedule an appointment as soon as possible for a visit in 1 week(s)  [address] [place] [address]  [phone]            Discharge Medication List as of 6/2/2022  1:46 PM      START taking these medications    Details   cyanocobalamin (CYANOCOBALAMIN) 100 MCG TABS Take 1 5 tablets (150 mcg total) by mouth daily, Starting Fri 6/3/2022, Until Sun 7/3/2022, Normal         CONTINUE these medications which have NOT CHANGED    Details   aspirin 81 mg chewable tablet Chew 1 tablet (81 mg total) daily, Starting Fri 7/30/2021, Normal      atorvastatin (LIPITOR) 40 mg tablet TAKE ONE TABLET BY MOUTH ONE TIME DAILY IN THE EVENING , Normal      latanoprost (XALATAN) 0 005 % ophthalmic solution Starting Mon 2/8/2021, Historical Med      LORazepam (ATIVAN) 0 5 mg tablet Take 1 tablet (0 5 mg total) by mouth 2 (two) times a day as needed for anxiety, Starting Fri 5/6/2022, Until Sun 6/5/2022 at 2359, Normal      metoprolol tartrate (LOPRESSOR) 25 mg tablet Take 1 tablet (25 mg total) by mouth 2 (two) times a day, Starting Fri 5/6/2022, Normal      omeprazole (PriLOSEC) 20 mg delayed release capsule Take 1 capsule (20 mg total) by mouth daily, Starting Thu 2/3/2022, Normal      amLODIPine (NORVASC) 5 mg tablet Take 1 tablet (5 mg total) by mouth daily, Starting Thu 2/3/2022, Until Wed 5/4/2022, Normal      meloxicam (MOBIC) 7 5 mg tablet Take 1 tablet (7 5 mg total) by mouth daily, Starting Tue 8/3/2021, Normal      ondansetron (Zofran ODT) 4 mg disintegrating tablet Take 1 tablet (4 mg total) by mouth every 6 (six) hours as needed for nausea or vomiting, Starting Wed 2/16/2022, Normal             Outpatient Discharge Orders   Discharge Diet     Activity as tolerated     Call provider for:  difficulty breathing, headache or visual disturbances     Call provider for:  persistent dizziness or light-headedness     Call provider for:  extreme fatigue       PDMP Review       Value Time User    PDMP Reviewed  Yes 5/25/2021  2:10 PM Yulissa Jurado MD          ED Provider  Electronically Signed by           Yessica Barry MD  06/05/22 0052

## 2022-06-06 NOTE — PHYSICIAN ADVISOR
Current patient class: Inpatient  The patient is currently on Hospital Day: 2 at [address] [place] [address]      The patient was admitted to the hospital  on 6/1/22 at  1:21 PM for the following diagnosis:  Dizziness [R42]  Stroke-like symptoms [R29 90]     After review of the relevant documentation, labs, vital signs and test results, this is a PROVIDER LIABLE case  In this particular case the patient was admitted to the hospital as an inpatient  The patient however failed to satisfy the 2 midnight benchmark and closer scrutiny of the case was warranted  After review of the patient presentation and relevant labs the patient was most appropriate for observation or outpatient class at the time of admission  Given that this patient has already been discharged prior to this review they become a provider liable case  Rationale is as follows: The patient is a 81 yrs   Female who presented to the ED at 6/1/2022 11:20 AM with a chief complaint of Weakness - Generalized (Pt brought in from home for weakness and dizziness; per EMS, family reported alt mental status lasting 30 minutes earlier and a hx TIAs)  Patient admitted for stroke evaluation and ultimately found to have hypertensive encephalopathy and b12 deficiency  Given her stability I recommend part b correction      The patients vitals on arrival were   ED Triage Vitals   Temperature Pulse Respirations Blood Pressure SpO2   06/01/22 1123 06/01/22 1123 06/01/22 1123 06/01/22 1123 06/01/22 1123   97 8 °F (36 6 °C) 83 18 165/74 95 %      Temp Source Heart Rate Source Patient Position - Orthostatic VS BP Location FiO2 (%)   06/01/22 1123 06/01/22 1123 06/01/22 1123 06/01/22 1123 --   Oral Monitor Lying Left arm       Pain Score       06/01/22 2246       No Pain           Past Medical History:   Diagnosis Date    Allergic rhinitis     Anxiety     Diverticulitis of large intestine without perforation or abscess without bleeding     Dysthymic disorder     Gastro-esophageal reflux disease without esophagitis     HLD (hyperlipidemia)     Hypertension     Osteopenia     Palpitations     Paresthesia     Stroke [facility])      Past Surgical History:   Procedure Laterality Date    APPENDECTOMY      CHOLECYSTECTOMY      COLON SURGERY      FL INJECTION RIGHT HIP (NON ARTHROGRAM)  8/28/2019    HYSTERECTOMY             Consults have been placed to:   IP CONSULT TO NEUROLOGY    Vitals:    06/02/22 0432 06/02/22 0746 06/02/22 1021 06/02/22 1225   BP: 141/64 149/73 (!) 176/84 141/58   BP Location:  Left arm     Pulse:  87 95 67   Resp:  16     Temp:  98 4 °F (36 9 °C)     TempSrc:  Oral     SpO2:  95%     Weight:           Most recent labs:    No results for input(s): WBC, HGB, HCT, PLT, K, NA, CALCIUM, BUN, CREATININE, LIPASE, AMYLASE, INR, TROPONINI, CKTOTAL, AST, ALT, ALKPHOS, BILITOT in the last 72 hours  Scheduled Meds:  Continuous Infusions:No current facility-administered medications for this encounter  PRN Meds:      Surgical procedures (if appropriate):

## 2022-06-09 ENCOUNTER — OFFICE VISIT (OUTPATIENT)
Dept: INTERNAL MEDICINE CLINIC | Facility: CLINIC | Age: 87
End: 2022-06-09
Payer: MEDICARE

## 2022-06-09 VITALS
BODY MASS INDEX: 18.21 KG/M2 | OXYGEN SATURATION: 98 % | TEMPERATURE: 98.5 F | SYSTOLIC BLOOD PRESSURE: 132 MMHG | HEART RATE: 79 BPM | WEIGHT: 102.8 LBS | DIASTOLIC BLOOD PRESSURE: 78 MMHG | HEIGHT: 63 IN

## 2022-06-09 DIAGNOSIS — Z76.89 ENCOUNTER FOR SUPPORT AND COORDINATION OF TRANSITION OF CARE: Primary | ICD-10-CM

## 2022-06-09 DIAGNOSIS — R47.89 WORD FINDING DIFFICULTY: ICD-10-CM

## 2022-06-09 DIAGNOSIS — I10 ESSENTIAL HYPERTENSION: ICD-10-CM

## 2022-06-09 DIAGNOSIS — I16.1 HYPERTENSIVE EMERGENCY: ICD-10-CM

## 2022-06-09 DIAGNOSIS — G31.84 MILD COGNITIVE IMPAIRMENT: ICD-10-CM

## 2022-06-09 DIAGNOSIS — F41.9 ANXIETY: ICD-10-CM

## 2022-06-09 PROCEDURE — 99496 TRANSJ CARE MGMT HIGH F2F 7D: CPT | Performed by: INTERNAL MEDICINE

## 2022-06-09 NOTE — PROGRESS NOTES
Assessment/Plan:     No problem-specific Assessment & Plan notes found for this encounter  Diagnoses and all orders for this visit:    Encounter for support and coordination of transition of care    Hypertensive emergency    Essential hypertension    Mild cognitive impairment    Anxiety    Word finding difficulty      symptoms have now improved, blood pressure has been stable at home, patient is encouraged to bring a blood pressure log for review to the next office visit  She will continue with current antihypertensive regimen  Patient does have anxiety and takes Ativan 0 5 mg to twice a daily  Discussed with the patient and the daughter that we need to taper this medication off and we could replace it with the other  safer alternatives  Both reported that her anxiety was the cause of the hypertensive emergency and she needs to be absolutely on Ativan and are not ready to consider other options as she has been on this for a long time     Discussed other supportive measures  Patient will follow-up in 1 month time  Subjective:     Patient ID: Ciro Cuellar is a 81 y o  female  Patient comes for transition of care following recent hospitalization for hypertensive emergency with the stroke-like symptoms which ultimately was attribute to encephalopathy  secondary to hypertensive emergency  Review of Systems   Constitutional: Negative for appetite change, chills, diaphoresis, fatigue, fever and unexpected weight change  Respiratory: Negative for apnea, cough, choking, chest tightness, shortness of breath, wheezing and stridor  Cardiovascular: Negative for chest pain, palpitations and leg swelling  Gastrointestinal: Negative for abdominal distention, abdominal pain, anal bleeding, blood in stool, constipation, diarrhea, nausea and vomiting  Genitourinary: Negative for decreased urine volume, difficulty urinating, frequency and urgency     Musculoskeletal: Negative for arthralgias, back pain and myalgias  Neurological: Negative for dizziness, tremors, seizures, syncope, speech difficulty, weakness, light-headedness, numbness and headaches  Psychiatric/Behavioral: Negative for agitation, behavioral problems, confusion, decreased concentration, dysphoric mood, hallucinations, self-injury, sleep disturbance and suicidal ideas  The patient is nervous/anxious  The patient is not hyperactive  Objective:     Physical Exam  Vitals reviewed  Constitutional:       General: She is not in acute distress  Appearance: Normal appearance  She is not ill-appearing, toxic-appearing or diaphoretic  HENT:      Mouth/Throat:      Mouth: Mucous membranes are moist    Cardiovascular:      Rate and Rhythm: Normal rate and regular rhythm  Pulses: Normal pulses  Heart sounds: Normal heart sounds  No murmur heard  No friction rub  No gallop  Pulmonary:      Effort: Pulmonary effort is normal  No respiratory distress  Breath sounds: Normal breath sounds  No stridor  No wheezing, rhonchi or rales  Chest:      Chest wall: No tenderness  Musculoskeletal:         General: No swelling or tenderness  Right lower leg: No edema  Left lower leg: No edema  Skin:     General: Skin is warm and dry  Findings: No lesion or rash  Neurological:      General: No focal deficit present  Mental Status: She is alert and oriented to person, place, and time  Mental status is at baseline  Vitals:    06/09/22 1620   BP: 132/78   BP Location: Left arm   Patient Position: Sitting   Cuff Size: Adult   Pulse: 79   Temp: 98 5 °F (36 9 °C)   TempSrc: Temporal   SpO2: 98%   Weight: 46 6 kg (102 lb 12 8 oz)   Height: 5' 3" (1 6 m)       Transitional Care Management Review:  Fredrick Mtz is a 81 y o  female here for TCM follow up       During the TCM phone call patient stated:    TCM Call (since 5/10/2022)     Date and time call was made  6/3/2022  8:50 AM    Hospital care reviewed Records reviewed    Patient was hospitialized at  [facility]    Date of Admission  06/01/22    Date of discharge  06/02/22    Diagnosis  word finding difficulty    Disposition  Home    Current Symptoms  --  right hip pain      TCM Call (since 5/10/2022)     Post hospital issues  Reduced activity    Scheduled for follow up?   Yes    Did you obtain your prescribed medications  Yes    Do you need help managing your prescriptions or medications  Yes    Why type of assitance do you need  daughter    Is transportation to your appointment needed  No    I have advised the patient to call PCP with any new or worsening symptoms  Farzaneh Márquez MD

## 2022-06-10 PROBLEM — F41.9 ANXIETY: Status: ACTIVE | Noted: 2022-06-10

## 2022-06-13 DIAGNOSIS — F41.0 ANXIETY ATTACK: ICD-10-CM

## 2022-06-14 RX ORDER — LORAZEPAM 0.5 MG/1
0.5 TABLET ORAL 2 TIMES DAILY PRN
Qty: 60 TABLET | Refills: 0 | Status: SHIPPED | OUTPATIENT
Start: 2022-06-14 | End: 2022-07-19 | Stop reason: SDUPTHER

## 2022-07-19 DIAGNOSIS — F41.0 ANXIETY ATTACK: ICD-10-CM

## 2022-07-19 DIAGNOSIS — K21.9 GASTROESOPHAGEAL REFLUX DISEASE WITHOUT ESOPHAGITIS: ICD-10-CM

## 2022-07-19 DIAGNOSIS — I10 ESSENTIAL HYPERTENSION: ICD-10-CM

## 2022-07-19 RX ORDER — LORAZEPAM 0.5 MG/1
0.5 TABLET ORAL 2 TIMES DAILY
Qty: 60 TABLET | Refills: 0 | COMMUNITY
Start: 2022-07-19 | End: 2022-07-26 | Stop reason: SDUPTHER

## 2022-07-19 RX ORDER — OMEPRAZOLE 20 MG/1
20 CAPSULE, DELAYED RELEASE ORAL DAILY
Qty: 90 CAPSULE | Refills: 0 | Status: SHIPPED | OUTPATIENT
Start: 2022-07-19 | End: 2022-10-24 | Stop reason: SDUPTHER

## 2022-07-19 NOTE — TELEPHONE ENCOUNTER
QGC:05/32/4326  NOV: does not have one    Metoprolol not due for refill until 8/4, but pt requested to have refill on file

## 2022-07-26 DIAGNOSIS — F41.0 ANXIETY ATTACK: ICD-10-CM

## 2022-07-26 RX ORDER — LORAZEPAM 0.5 MG/1
0.5 TABLET ORAL 2 TIMES DAILY
Qty: 60 TABLET | Refills: 0 | Status: SHIPPED | OUTPATIENT
Start: 2022-07-26 | End: 2022-08-23 | Stop reason: SDUPTHER

## 2022-08-23 DIAGNOSIS — F41.0 ANXIETY ATTACK: ICD-10-CM

## 2022-08-23 RX ORDER — LORAZEPAM 0.5 MG/1
0.5 TABLET ORAL 2 TIMES DAILY
Qty: 60 TABLET | Refills: 0 | Status: SHIPPED | OUTPATIENT
Start: 2022-08-23 | End: 2022-09-23 | Stop reason: SDUPTHER

## 2022-09-08 ENCOUNTER — HOSPITAL ENCOUNTER (EMERGENCY)
Facility: HOSPITAL | Age: 87
Discharge: HOME | End: 2022-09-08
Attending: EMERGENCY MEDICINE
Payer: MEDICARE

## 2022-09-08 VITALS
TEMPERATURE: 97.5 F | HEART RATE: 102 BPM | DIASTOLIC BLOOD PRESSURE: 81 MMHG | OXYGEN SATURATION: 97 % | SYSTOLIC BLOOD PRESSURE: 177 MMHG | RESPIRATION RATE: 16 BRPM

## 2022-09-08 DIAGNOSIS — R11.0 NAUSEA: Primary | ICD-10-CM

## 2022-09-08 DIAGNOSIS — R51.9 HEADACHE: ICD-10-CM

## 2022-09-08 DIAGNOSIS — F41.9 ANXIETY: ICD-10-CM

## 2022-09-08 LAB
ALBUMIN SERPL BCP-MCNC: 3.8 G/DL (ref 3.5–5)
ALP SERPL-CCNC: 67 U/L (ref 46–116)
ALT SERPL W P-5'-P-CCNC: 20 U/L (ref 12–78)
ANION GAP SERPL CALCULATED.3IONS-SCNC: 6 MMOL/L (ref 4–13)
AST SERPL W P-5'-P-CCNC: 14 U/L (ref 5–45)
BASOPHILS # BLD AUTO: 0.04 THOUSANDS/ΜL (ref 0–0.1)
BASOPHILS NFR BLD AUTO: 0 % (ref 0–1)
BILIRUB SERPL-MCNC: 0.5 MG/DL (ref 0.2–1)
BUN SERPL-MCNC: 18 MG/DL (ref 5–25)
CALCIUM SERPL-MCNC: 9.7 MG/DL (ref 8.3–10.1)
CHLORIDE SERPL-SCNC: 106 MMOL/L (ref 96–108)
CO2 SERPL-SCNC: 24 MMOL/L (ref 21–32)
CREAT SERPL-MCNC: 0.84 MG/DL (ref 0.6–1.3)
EOSINOPHIL # BLD AUTO: 0.03 THOUSAND/ΜL (ref 0–0.61)
EOSINOPHIL NFR BLD AUTO: 0 % (ref 0–6)
ERYTHROCYTE [DISTWIDTH] IN BLOOD BY AUTOMATED COUNT: 12 % (ref 11.6–15.1)
GFR SERPL CREATININE-BSD FRML MDRD: 63 ML/MIN/1.73SQ M
GLUCOSE SERPL-MCNC: 104 MG/DL (ref 65–140)
HCT VFR BLD AUTO: 39.3 % (ref 34.8–46.1)
HGB BLD-MCNC: 13.4 G/DL (ref 11.5–15.4)
IMM GRANULOCYTES # BLD AUTO: 0.03 THOUSAND/UL (ref 0–0.2)
IMM GRANULOCYTES NFR BLD AUTO: 0 % (ref 0–2)
LIPASE SERPL-CCNC: 160 U/L (ref 73–393)
LYMPHOCYTES # BLD AUTO: 0.89 THOUSANDS/ΜL (ref 0.6–4.47)
LYMPHOCYTES NFR BLD AUTO: 8 % (ref 14–44)
MCH RBC QN AUTO: 32.8 PG (ref 26.8–34.3)
MCHC RBC AUTO-ENTMCNC: 34.1 G/DL (ref 31.4–37.4)
MCV RBC AUTO: 96 FL (ref 82–98)
MONOCYTES # BLD AUTO: 0.36 THOUSAND/ΜL (ref 0.17–1.22)
MONOCYTES NFR BLD AUTO: 3 % (ref 4–12)
NEUTROPHILS # BLD AUTO: 9.51 THOUSANDS/ΜL (ref 1.85–7.62)
NEUTS SEG NFR BLD AUTO: 89 % (ref 43–75)
NRBC BLD AUTO-RTO: 0 /100 WBCS
PLATELET # BLD AUTO: 226 THOUSANDS/UL (ref 149–390)
PMV BLD AUTO: 10.4 FL (ref 8.9–12.7)
POTASSIUM SERPL-SCNC: 4.2 MMOL/L (ref 3.5–5.3)
PROT SERPL-MCNC: 7.2 G/DL (ref 6.4–8.4)
RBC # BLD AUTO: 4.09 MILLION/UL (ref 3.81–5.12)
SODIUM SERPL-SCNC: 136 MMOL/L (ref 135–147)
WBC # BLD AUTO: 10.86 THOUSAND/UL (ref 4.31–10.16)

## 2022-09-08 PROCEDURE — 36415 COLL VENOUS BLD VENIPUNCTURE: CPT | Performed by: INTERNAL MEDICINE

## 2022-09-08 PROCEDURE — 99284 EMERGENCY DEPT VISIT MOD MDM: CPT

## 2022-09-08 PROCEDURE — 99282 EMERGENCY DEPT VISIT SF MDM: CPT | Performed by: EMERGENCY MEDICINE

## 2022-09-08 PROCEDURE — 80053 COMPREHEN METABOLIC PANEL: CPT | Performed by: INTERNAL MEDICINE

## 2022-09-08 PROCEDURE — 85025 COMPLETE CBC W/AUTO DIFF WBC: CPT | Performed by: INTERNAL MEDICINE

## 2022-09-08 PROCEDURE — 83690 ASSAY OF LIPASE: CPT | Performed by: INTERNAL MEDICINE

## 2022-09-08 RX ORDER — ACETAMINOPHEN 325 MG/1
975 TABLET ORAL ONCE
Status: COMPLETED | OUTPATIENT
Start: 2022-09-08 | End: 2022-09-08

## 2022-09-08 RX ADMIN — ACETAMINOPHEN 975 MG: 325 TABLET ORAL at 17:40

## 2022-09-08 NOTE — ED PROVIDER NOTES
chHistory  Chief Complaint   Patient presents with    Nausea     Pt states she started with nausea this afternoon, denies vomiting or diarrhea; pt also reports anxiety about her daughter starting a new job     HPI  15-year-old female presents to ED for evaluation anxiety and upset stomach  Patient reports increased anxiety and loneliness as her daughter got a job and is starting today  She reports her belly is uncomfortable  She describes nausea without any vomiting  She denies diarrhea, melena or hematochezia  She has no other complaints or concerns at this time  She has been eating and drinking normally  Patient denies headache, visual changes, dizziness, fevers, chills, chest pain, palpitations, dysuria, new skin rashes or numbness or tingling of the extremities  Patient has 2 daughters who present to the ER at different times  They report patient has anxiety and this is how she responds to change  They reports she lives with 1 of her daughters  And she has been in her normal state health  She has been eating and drinking normally  Prior to Admission Medications   Prescriptions Last Dose Informant Patient Reported? Taking?    LORazepam (ATIVAN) 0 5 mg tablet   No No   Sig: Take 1 tablet (0 5 mg total) by mouth 2 (two) times a day   amLODIPine (NORVASC) 5 mg tablet  Child No No   Sig: Take 1 tablet (5 mg total) by mouth daily   aspirin 81 mg chewable tablet  Child No No   Sig: Chew 1 tablet (81 mg total) daily   atorvastatin (LIPITOR) 40 mg tablet  Child No No   Sig: TAKE ONE TABLET BY MOUTH ONE TIME DAILY IN THE EVENING    Patient not taking: Reported on 6/9/2022   cyanocobalamin (CYANOCOBALAMIN) 100 MCG TABS  Child No No   Sig: Take 1 5 tablets (150 mcg total) by mouth daily   latanoprost (XALATAN) 0 005 % ophthalmic solution  Child Yes No   meloxicam (MOBIC) 7 5 mg tablet  Child No No   Sig: Take 1 tablet (7 5 mg total) by mouth daily   Patient not taking: Reported on 6/9/2022   metoprolol tartrate (LOPRESSOR) 25 mg tablet   No No   Sig: Take 1 tablet (25 mg total) by mouth 2 (two) times a day   omeprazole (PriLOSEC) 20 mg delayed release capsule   No No   Sig: Take 1 capsule (20 mg total) by mouth daily   ondansetron (Zofran ODT) 4 mg disintegrating tablet  Child No No   Sig: Take 1 tablet (4 mg total) by mouth every 6 (six) hours as needed for nausea or vomiting   Patient not taking: No sig reported      Facility-Administered Medications: None       Past Medical History:   Diagnosis Date    Allergic rhinitis     Anxiety     Diverticulitis of large intestine without perforation or abscess without bleeding     Dysthymic disorder     Gastro-esophageal reflux disease without esophagitis     HLD (hyperlipidemia)     Hypertension     Osteopenia     Palpitations     Paresthesia     Stroke [facility])        Past Surgical History:   Procedure Laterality Date    APPENDECTOMY      CHOLECYSTECTOMY      COLON SURGERY      FL INJECTION RIGHT HIP (NON ARTHROGRAM)  8/28/2019    HYSTERECTOMY         Family History   Problem Relation Age of Onset    Hypertension Mother     Hypertension Father      I have reviewed and agree with the history as documented  E-Cigarette/Vaping    E-Cigarette Use Never User      E-Cigarette/Vaping Substances    Nicotine No     THC No     CBD No     Flavoring No     Other No     Unknown No      Social History     Tobacco Use    Smoking status: Former Smoker    Smokeless tobacco: Never Used   Vaping Use    Vaping Use: Never used   Substance Use Topics    Alcohol use: Not Currently    Drug use: Not Currently        Review of Systems   All other systems reviewed and are negative        Physical Exam  ED Triage Vitals   Temperature Pulse Respirations Blood Pressure SpO2   09/08/22 1740 09/08/22 1409 09/08/22 1409 09/08/22 1409 09/08/22 1409   97 5 °F (36 4 °C) 92 18 117/65 98 %      Temp Source Heart Rate Source Patient Position - Orthostatic VS BP Location FiO2 (%) 09/08/22 1740 09/08/22 1409 09/08/22 1409 09/08/22 1409 --   Oral Monitor Lying Right arm       Pain Score       09/08/22 1538       No Pain             Orthostatic Vital Signs  Vitals:    09/08/22 1409 09/08/22 1538   BP: 117/65 (!) 177/81   Pulse: 92 102   Patient Position - Orthostatic VS: Lying        Physical Exam   PHYSICAL EXAM    Constitutional:  Well developed, well nourished, no acute distress, non-toxic appearance    HEENT:  Conjunctiva normal  Oropharynx moist  Respiratory:  No respiratory distress, normal breath sounds  Cardiovascular:  Normal rate, normal rhythm, no murmurs  GI:  Soft, nondistended, normal bowel sounds, nontender  :  No costovertebral angle tenderness   Musculoskeletal:  No edema, no tenderness, no deformities     Integument:  Well hydrated, no rash   Lymphatic:  No lymphadenopathy noted   Neurologic:  Alert & oriented, normal motor function, normal sensory function, no focal deficits noted   Psychiatric:  Speech and behavior appropriate       ED Medications  Medications   acetaminophen (TYLENOL) tablet 975 mg (975 mg Oral Given 9/8/22 1740)       Diagnostic Studies  Results Reviewed     Procedure Component Value Units Date/Time    [facility] [[id number]] Collected: 09/08/22 1538    Lab Status: Final result Specimen: Blood from Arm, Right Updated: 09/08/22 1607     Sodium 136 mmol/L      Potassium 4 2 mmol/L      Chloride 106 mmol/L      CO2 24 mmol/L      ANION GAP 6 mmol/L      BUN 18 mg/dL      Creatinine 0 84 mg/dL      Glucose 104 mg/dL      Calcium 9 7 mg/dL      AST 14 U/L      ALT 20 U/L      Alkaline Phosphatase 67 U/L      Total Protein 7 2 g/dL      Albumin 3 8 g/dL      Total Bilirubin 0 50 mg/dL      eGFR 63 ml/min/1 73sq m     Narrative:      [place] guidelines for Chronic Kidney Disease (CKD):     Stage 1 with normal or high GFR (GFR > 90 mL/min/1 73 square meters)    Stage 2 Mild CKD (GFR = 60-89 mL/min/1 73 square meters)    Stage 3A Moderate CKD (GFR = 45-59 mL/min/1 73 square meters)    Stage 3B Moderate CKD (GFR = 30-44 mL/min/1 73 square meters)    Stage 4 Severe CKD (GFR = 15-29 mL/min/1 73 square meters)    Stage 5 End Stage CKD (GFR <15 mL/min/1 73 square meters)  Note: GFR calculation is accurate only with a steady state creatinine    Lipase [[id number]]  (Normal) Collected: 09/08/22 1538    Lab Status: Final result Specimen: Blood from Arm, Right Updated: 09/08/22 1607     Lipase 160 u/L     CBC and differential [[id number]]  (Abnormal) Collected: 09/08/22 1538    Lab Status: Final result Specimen: Blood from Arm, Right Updated: 09/08/22 1550     WBC 10 86 Thousand/uL      RBC 4 09 Million/uL      Hemoglobin 13 4 g/dL      Hematocrit 39 3 %      MCV 96 fL      MCH 32 8 pg      MCHC 34 1 g/dL      RDW 12 0 %      MPV 10 4 fL      Platelets 431 Thousands/uL      nRBC 0 /100 WBCs      Neutrophils Relative 89 %      Immat GRANS % 0 %      Lymphocytes Relative 8 %      Monocytes Relative 3 %      Eosinophils Relative 0 %      Basophils Relative 0 %      Neutrophils Absolute 9 51 Thousands/µL      Immature Grans Absolute 0 03 Thousand/uL      Lymphocytes Absolute 0 89 Thousands/µL      Monocytes Absolute 0 36 Thousand/µL      Eosinophils Absolute 0 03 Thousand/µL      Basophils Absolute 0 04 Thousands/µL                  No orders to display         Procedures  Procedures      ED Course                                       MDM  Number of Diagnoses or Management Options  Anxiety  Headache  Nausea  Diagnosis management comments: Workup unremarkable  On re-evaluation, patient had resolution of nausea but reported headache  Tylenol given for headache  At this time patient and her daughter did not want any further workup or treatment    Patient discharged home with her daughter      Disposition  Final diagnoses:   Nausea   Headache   Anxiety     Time reflects when diagnosis was documented in both MDM as applicable and the Disposition within this note Time User Action Codes Description Comment    9/8/2022  5:29 PM Argentina Shields Add [R11 0] Nausea     9/8/2022  5:29 PM Diamond Feldmanos [R51 9] Headache     9/8/2022  5:29 PM Argentina Shields Add [F41 9] Anxiety       ED Disposition     ED Disposition   Discharge    Condition   Stable    Date/Time   Thu Sep 8, 2022  5:29 PM    [address] discharge to home/self care  Follow-up Information    None         Discharge Medication List as of 9/8/2022  5:30 PM      CONTINUE these medications which have NOT CHANGED    Details   amLODIPine (NORVASC) 5 mg tablet Take 1 tablet (5 mg total) by mouth daily, Starting Thu 2/3/2022, Until Thu 6/9/2022, Normal      aspirin 81 mg chewable tablet Chew 1 tablet (81 mg total) daily, Starting Fri 7/30/2021, Normal      atorvastatin (LIPITOR) 40 mg tablet TAKE ONE TABLET BY MOUTH ONE TIME DAILY IN THE EVENING , Normal      cyanocobalamin (CYANOCOBALAMIN) 100 MCG TABS Take 1 5 tablets (150 mcg total) by mouth daily, Starting Fri 6/3/2022, Until Sun 7/3/2022, Normal      latanoprost (XALATAN) 0 005 % ophthalmic solution Starting Mon 2/8/2021, Historical Med      LORazepam (ATIVAN) 0 5 mg tablet Take 1 tablet (0 5 mg total) by mouth 2 (two) times a day, Starting Tue 8/23/2022, Until Thu 9/22/2022, Normal      meloxicam (MOBIC) 7 5 mg tablet Take 1 tablet (7 5 mg total) by mouth daily, Starting Tue 8/3/2021, Normal      metoprolol tartrate (LOPRESSOR) 25 mg tablet Take 1 tablet (25 mg total) by mouth 2 (two) times a day, Starting Tue 7/19/2022, Normal      omeprazole (PriLOSEC) 20 mg delayed release capsule Take 1 capsule (20 mg total) by mouth daily, Starting Tue 7/19/2022, Normal      ondansetron (Zofran ODT) 4 mg disintegrating tablet Take 1 tablet (4 mg total) by mouth every 6 (six) hours as needed for nausea or vomiting, Starting Wed 2/16/2022, Normal           No discharge procedures on file      PDMP Review       Value Time User    PDMP Reviewed  Yes 6/9/2022  4:43 PM Bina Agudelo MD           ED Provider  Attending physically available and evaluated Sherley Levinedarnell SAUNDERS managed the patient along with the ED Attending      Electronically Signed by         June Ortiz MD  09/08/22 7031

## 2022-09-08 NOTE — ED ATTENDING ATTESTATION
Manuel Haque MD, saw and evaluated the patient  I have discussed the patient with the resident and agree with the resident's findings, Plan of Care, and MDM as documented in the resident's note, except where noted  All available labs and Radiology studies were reviewed  I was present for key portions of any procedure(s) performed by the resident and I was immediately available to provide assistance  At this point I agree with the current assessment done in the Emergency Department  I have conducted an independent evaluation of this patient a history and physical is as follows:    80 yo female with a history of anxiety, HTN, GERD, prior CVA, and mild cognitive impairment brought to the ED by EMS for evaluation of nausea and anxiety  The patient reports an "upset stomach" and "nerves" because her daughter started a new job today  (+) Nausea but no vomiting  No diarrhea  She has been eating and drinking well  No fevers/chills  No recent weight loss  She denies chest pain, shortness of breath, and cough  No dysuria or hematuria  She denies SI  No other specific complaints  ROS: per resident physician note    Gen: NAD, alert an cooperative  HEENT: PERRL, EOMI  Neck: supple  CV: RRR  Lungs: CTA B/L  Abdomen: soft, NT/ND  Ext: no swelling or deformity  Neuro: 5/5 strength all extremities, sensation grossly intact  Skin: no rash    ED Course  The patient is well appearing with stable vital signs and a benign physical exam  Basic workup unremarkable  Daughter is at bedside --> she confirms that the patient's presentation is typical of her usual anxiety  She does not want to pursue a further workup at this time and wishes to take her mother home  Plan for follow up with her PCP next week for reassessment  The patient and her daughter are agreeable to this plan  Strict return precautions provided        Critical Care Time  Procedures

## 2022-09-23 ENCOUNTER — OFFICE VISIT (OUTPATIENT)
Dept: INTERNAL MEDICINE CLINIC | Facility: CLINIC | Age: 87
End: 2022-09-23
Payer: MEDICARE

## 2022-09-23 VITALS
HEIGHT: 63 IN | BODY MASS INDEX: 17.96 KG/M2 | HEART RATE: 85 BPM | WEIGHT: 101.4 LBS | OXYGEN SATURATION: 94 % | DIASTOLIC BLOOD PRESSURE: 90 MMHG | TEMPERATURE: 98.6 F | SYSTOLIC BLOOD PRESSURE: 152 MMHG

## 2022-09-23 DIAGNOSIS — Z23 NEEDS FLU SHOT: ICD-10-CM

## 2022-09-23 DIAGNOSIS — E44.1 MILD PROTEIN-CALORIE MALNUTRITION (HCC): ICD-10-CM

## 2022-09-23 DIAGNOSIS — K59.00 CONSTIPATION, UNSPECIFIED CONSTIPATION TYPE: Primary | ICD-10-CM

## 2022-09-23 DIAGNOSIS — F41.0 ANXIETY ATTACK: ICD-10-CM

## 2022-09-23 DIAGNOSIS — R11.0 NAUSEA: ICD-10-CM

## 2022-09-23 PROCEDURE — 90662 IIV NO PRSV INCREASED AG IM: CPT

## 2022-09-23 PROCEDURE — 99213 OFFICE O/P EST LOW 20 MIN: CPT | Performed by: INTERNAL MEDICINE

## 2022-09-23 PROCEDURE — G0008 ADMIN INFLUENZA VIRUS VAC: HCPCS

## 2022-09-23 RX ORDER — LORAZEPAM 0.5 MG/1
0.5 TABLET ORAL 2 TIMES DAILY
Qty: 60 TABLET | Refills: 0 | Status: SHIPPED | OUTPATIENT
Start: 2022-09-23 | End: 2022-10-19 | Stop reason: SDUPTHER

## 2022-09-23 RX ORDER — POLYETHYLENE GLYCOL 3350 17 G/17G
17 POWDER, FOR SOLUTION ORAL AS NEEDED
Qty: 17 G | Refills: 0 | Status: SHIPPED | OUTPATIENT
Start: 2022-09-23

## 2022-09-23 RX ORDER — ONDANSETRON 4 MG/1
4 TABLET, ORALLY DISINTEGRATING ORAL EVERY 6 HOURS PRN
Qty: 20 TABLET | Refills: 0 | Status: SHIPPED | OUTPATIENT
Start: 2022-09-23

## 2022-09-23 RX ORDER — DOCUSATE SODIUM 100 MG/1
100 CAPSULE, LIQUID FILLED ORAL 2 TIMES DAILY
Qty: 180 CAPSULE | Refills: 0 | Status: SHIPPED | OUTPATIENT
Start: 2022-09-23 | End: 2022-12-22

## 2022-09-23 NOTE — PROGRESS NOTES
Assessment/Plan:    Diagnoses and all orders for this visit:    Constipation, unspecified constipation type  Comments:  Discussed supportive therapy, recommend stool softners  Orders:  -     docusate sodium (COLACE) 100 mg capsule; Take 1 capsule (100 mg total) by mouth 2 (two) times a day  -     polyethylene glycol (MIRALAX) 17 g packet; Take 17 g by mouth as needed (constipation) for up to 10 doses    Anxiety attack  Comments:  needs refills  Orders:  -     LORazepam (ATIVAN) 0 5 mg tablet; Take 1 tablet (0 5 mg total) by mouth 2 (two) times a day    Nausea  -     ondansetron (Zofran ODT) 4 mg disintegrating tablet; Take 1 tablet (4 mg total) by mouth every 6 (six) hours as needed for nausea or vomiting    Mild protein-calorie malnutrition (HCC)    Needs flu shot  -     FLUZONE HIGH-DOSE: influenza vaccine, high-dose, preservative-free 0 7 mL         BMI Counseling: Body mass index is 17 96 kg/m²  The BMI is below normal  Patient advised to gain weight  Rationale for BMI follow-up plan is due to patient being underweight  There are no Patient Instructions on file for this visit  Subjective:      Patient ID: Elvi Nuñez is a 81 y o  female    Patient comes for complaints of generalized abdominal pain  Patient has problems with constipation, last bowel movement was 2 days ago  Denies fever chills vomiting or blood in the stools  Has occasional nausea          Current Outpatient Medications:     amLODIPine (NORVASC) 5 mg tablet, Take 1 tablet (5 mg total) by mouth daily, Disp: 90 tablet, Rfl: 2    aspirin 81 mg chewable tablet, Chew 1 tablet (81 mg total) daily, Disp: 30 tablet, Rfl: 0    cyanocobalamin (CYANOCOBALAMIN) 100 MCG TABS, Take 1 5 tablets (150 mcg total) by mouth daily, Disp: 45 tablet, Rfl: 0    docusate sodium (COLACE) 100 mg capsule, Take 1 capsule (100 mg total) by mouth 2 (two) times a day, Disp: 180 capsule, Rfl: 0    latanoprost (XALATAN) 0 005 % ophthalmic solution, , Disp: , Rfl:     LORazepam (ATIVAN) 0 5 mg tablet, Take 1 tablet (0 5 mg total) by mouth 2 (two) times a day, Disp: 60 tablet, Rfl: 0    metoprolol tartrate (LOPRESSOR) 25 mg tablet, Take 1 tablet (25 mg total) by mouth 2 (two) times a day, Disp: 180 tablet, Rfl: 0    omeprazole (PriLOSEC) 20 mg delayed release capsule, Take 1 capsule (20 mg total) by mouth daily, Disp: 90 capsule, Rfl: 0    ondansetron (Zofran ODT) 4 mg disintegrating tablet, Take 1 tablet (4 mg total) by mouth every 6 (six) hours as needed for nausea or vomiting, Disp: 20 tablet, Rfl: 0    polyethylene glycol (MIRALAX) 17 g packet, Take 17 g by mouth as needed (constipation) for up to 10 doses, Disp: 17 g, Rfl: 0    atorvastatin (LIPITOR) 40 mg tablet, TAKE ONE TABLET BY MOUTH ONE TIME DAILY IN THE EVENING  (Patient not taking: No sig reported), Disp: 30 tablet, Rfl: 0    meloxicam (MOBIC) 7 5 mg tablet, Take 1 tablet (7 5 mg total) by mouth daily (Patient not taking: No sig reported), Disp: 30 tablet, Rfl: 0     Past Medical History:   Diagnosis Date    Allergic rhinitis     Anxiety     Diverticulitis of large intestine without perforation or abscess without bleeding     Dysthymic disorder     Gastro-esophageal reflux disease without esophagitis     HLD (hyperlipidemia)     Hypertension     Osteopenia     Palpitations     Paresthesia     Stroke [facility])          Past Surgical History:   Procedure Laterality Date    APPENDECTOMY      CHOLECYSTECTOMY      COLON SURGERY      FL INJECTION RIGHT HIP (NON ARTHROGRAM)  8/28/2019    HYSTERECTOMY           No Known Allergies    Recent Results (from the past 1008 hour(s))   CBC and differential    Collection Time: 09/08/22  3:38 PM   Result Value Ref Range    WBC 10 86 (H) 4 31 - 10 16 Thousand/uL    RBC 4 09 3 81 - 5 12 Million/uL    Hemoglobin 13 4 11 5 - 15 4 g/dL    Hematocrit 39 3 34 8 - 46 1 %    MCV 96 82 - 98 fL    MCH 32 8 26 8 - 34 3 pg    MCHC 34 1 31 4 - 37 4 g/dL    RDW 12 0 11 6 - 15 1 %    MPV 10 4 8 9 - 12 7 fL    Platelets 513 855 - 433 Thousands/uL    nRBC 0 /100 WBCs    Neutrophils Relative 89 (H) 43 - 75 %    Immat GRANS % 0 0 - 2 %    Lymphocytes Relative 8 (L) 14 - 44 %    Monocytes Relative 3 (L) 4 - 12 %    Eosinophils Relative 0 0 - 6 %    Basophils Relative 0 0 - 1 %    Neutrophils Absolute 9 51 (H) 1 85 - 7 62 Thousands/µL    Immature Grans Absolute 0 03 0 00 - 0 20 Thousand/uL    Lymphocytes Absolute 0 89 0 60 - 4 47 Thousands/µL    Monocytes Absolute 0 36 0 17 - 1 22 Thousand/µL    Eosinophils Absolute 0 03 0 00 - 0 61 Thousand/µL    Basophils Absolute 0 04 0 00 - 0 10 Thousands/µL   CMP    Collection Time: 09/08/22  3:38 PM   Result Value Ref Range    Sodium 136 135 - 147 mmol/L    Potassium 4 2 3 5 - 5 3 mmol/L    Chloride 106 96 - 108 mmol/L    CO2 24 21 - 32 mmol/L    ANION GAP 6 4 - 13 mmol/L    BUN 18 5 - 25 mg/dL    Creatinine 0 84 0 60 - 1 30 mg/dL    Glucose 104 65 - 140 mg/dL    Calcium 9 7 8 3 - 10 1 mg/dL    AST 14 5 - 45 U/L    ALT 20 12 - 78 U/L    Alkaline Phosphatase 67 46 - 116 U/L    Total Protein 7 2 6 4 - 8 4 g/dL    Albumin 3 8 3 5 - 5 0 g/dL    Total Bilirubin 0 50 0 20 - 1 00 mg/dL    eGFR 63 ml/min/1 73sq m   Lipase    Collection Time: 09/08/22  3:38 PM   Result Value Ref Range    Lipase 160 73 - 393 u/L       The following portions of the patient's history were reviewed and updated as appropriate: allergies, current medications, past family history, past medical history, past social history, past surgical history and problem list      Review of Systems   Constitutional: Negative for appetite change, chills, diaphoresis, fatigue, fever and unexpected weight change  Respiratory: Negative for apnea, cough, choking, chest tightness, shortness of breath, wheezing and stridor  Cardiovascular: Negative for chest pain, palpitations and leg swelling  Gastrointestinal: Positive for abdominal pain and constipation   Negative for abdominal distention, anal bleeding, blood in stool, diarrhea, nausea and vomiting  Genitourinary: Negative for decreased urine volume, difficulty urinating, frequency and urgency  Musculoskeletal: Negative for arthralgias, back pain and myalgias  Neurological: Negative for dizziness, light-headedness, numbness and headaches  Objective:      Vitals:    09/23/22 1335   BP: 152/90   Pulse: 85   Temp: 98 6 °F (37 °C)   SpO2: 94%          Physical Exam  Vitals reviewed  Constitutional:       General: She is not in acute distress  Appearance: Normal appearance  She is not ill-appearing, toxic-appearing or diaphoretic  HENT:      Mouth/Throat:      Mouth: Mucous membranes are moist    Cardiovascular:      Rate and Rhythm: Normal rate and regular rhythm  Pulses: Normal pulses  Heart sounds: Normal heart sounds  No murmur heard  No friction rub  No gallop  Pulmonary:      Effort: Pulmonary effort is normal  No respiratory distress  Breath sounds: Normal breath sounds  No stridor  No wheezing, rhonchi or rales  Chest:      Chest wall: No tenderness  Abdominal:      General: There is no distension  Palpations: Abdomen is soft  There is no mass  Tenderness: There is no abdominal tenderness  There is no rebound  Musculoskeletal:         General: No swelling or tenderness  Right lower leg: No edema  Left lower leg: No edema  Skin:     General: Skin is warm and dry  Findings: No lesion or rash  Neurological:      Mental Status: She is alert and oriented to person, place, and time

## 2022-10-12 PROBLEM — Z12.11 SCREENING FOR COLON CANCER: Status: RESOLVED | Noted: 2021-11-10 | Resolved: 2022-10-12

## 2022-10-19 DIAGNOSIS — F41.0 ANXIETY ATTACK: ICD-10-CM

## 2022-10-19 RX ORDER — LORAZEPAM 0.5 MG/1
0.5 TABLET ORAL 2 TIMES DAILY
Qty: 60 TABLET | Refills: 0 | Status: SHIPPED | OUTPATIENT
Start: 2022-10-19 | End: 2022-11-18

## 2022-10-24 DIAGNOSIS — I10 ESSENTIAL HYPERTENSION: ICD-10-CM

## 2022-10-24 DIAGNOSIS — K21.9 GASTROESOPHAGEAL REFLUX DISEASE WITHOUT ESOPHAGITIS: ICD-10-CM

## 2022-10-24 RX ORDER — OMEPRAZOLE 20 MG/1
20 CAPSULE, DELAYED RELEASE ORAL DAILY
Qty: 90 CAPSULE | Refills: 0 | Status: SHIPPED | OUTPATIENT
Start: 2022-10-24

## 2022-10-24 RX ORDER — AMLODIPINE BESYLATE 5 MG/1
5 TABLET ORAL DAILY
Qty: 90 TABLET | Refills: 2 | Status: SHIPPED | OUTPATIENT
Start: 2022-10-24 | End: 2023-01-22

## 2022-11-21 DIAGNOSIS — F41.0 ANXIETY ATTACK: ICD-10-CM

## 2022-11-22 RX ORDER — LORAZEPAM 0.5 MG/1
0.5 TABLET ORAL 2 TIMES DAILY
Qty: 60 TABLET | Refills: 0 | Status: SHIPPED | OUTPATIENT
Start: 2022-11-22 | End: 2022-12-22

## 2022-11-25 ENCOUNTER — OFFICE VISIT (OUTPATIENT)
Dept: INTERNAL MEDICINE CLINIC | Facility: CLINIC | Age: 87
End: 2022-11-25

## 2022-11-25 VITALS
OXYGEN SATURATION: 99 % | HEART RATE: 77 BPM | SYSTOLIC BLOOD PRESSURE: 136 MMHG | DIASTOLIC BLOOD PRESSURE: 80 MMHG | WEIGHT: 103 LBS | TEMPERATURE: 99.2 F | HEIGHT: 63 IN | BODY MASS INDEX: 18.25 KG/M2

## 2022-11-25 DIAGNOSIS — I10 ESSENTIAL HYPERTENSION: ICD-10-CM

## 2022-11-25 DIAGNOSIS — I16.1 HYPERTENSIVE EMERGENCY: ICD-10-CM

## 2022-11-25 DIAGNOSIS — Z00.00 MEDICARE ANNUAL WELLNESS VISIT, SUBSEQUENT: ICD-10-CM

## 2022-11-25 DIAGNOSIS — E78.2 MODERATE MIXED HYPERLIPIDEMIA NOT REQUIRING STATIN THERAPY: ICD-10-CM

## 2022-11-25 DIAGNOSIS — Z78.0 ENCOUNTER FOR OSTEOPOROSIS SCREENING IN ASYMPTOMATIC POSTMENOPAUSAL PATIENT: Primary | ICD-10-CM

## 2022-11-25 DIAGNOSIS — Z13.820 ENCOUNTER FOR OSTEOPOROSIS SCREENING IN ASYMPTOMATIC POSTMENOPAUSAL PATIENT: Primary | ICD-10-CM

## 2022-11-25 NOTE — PROGRESS NOTES
Assessment and Plan:     Problem List Items Addressed This Visit        Cardiovascular and Mediastinum    Essential hypertension    Relevant Orders    CBC and differential    Comprehensive metabolic panel    Lipid panel   Other Visit Diagnoses     Encounter for osteoporosis screening in asymptomatic postmenopausal patient    -  Primary    Relevant Orders    DXA bone density spine hip and pelvis    Hypertensive emergency        Moderate mixed hyperlipidemia not requiring statin therapy        Relevant Orders    CBC and differential    Comprehensive metabolic panel    Lipid panel    Medicare annual wellness visit, subsequent               Preventive health issues were discussed with patient, and age appropriate screening tests were ordered as noted in patient's After Visit Summary  Personalized health advice and appropriate referrals for health education or preventive services given if needed, as noted in patient's After Visit Summary  History of Present Illness:     Patient presents for a Medicare Wellness Visit    Patient comes for Medicare annual wellness     Patient Care Team:  Jhonathan Jameson MD as PCP - General (Internal Medicine)     Review of Systems:     Review of Systems   Constitutional: Negative for appetite change, chills, diaphoresis, fatigue, fever and unexpected weight change  Respiratory: Negative for apnea, cough, choking, chest tightness, shortness of breath, wheezing and stridor  Cardiovascular: Negative for chest pain, palpitations and leg swelling  Gastrointestinal: Negative for abdominal distention, abdominal pain, anal bleeding, blood in stool, constipation, diarrhea, nausea and vomiting  Genitourinary: Negative for decreased urine volume, difficulty urinating, frequency and urgency  Musculoskeletal: Negative for arthralgias, back pain and myalgias  Neurological: Negative for dizziness, light-headedness, numbness and headaches          Problem List:     Patient Active Problem List   Diagnosis   • Dizziness   • Chest tightness   • Essential hypertension   • GERD (gastroesophageal reflux disease)   • Chronic idiopathic constipation   • Perianal lesion   • Word finding difficulty   • Mild cognitive impairment   • Diverticulitis large intestine w/o perforation or abscess w/o bleeding   • Small vessel stroke (HCC)   • Mild protein-calorie malnutrition (HCC)   • Vitamin B12 deficiency   • Anxiety      Past Medical and Surgical History:     Past Medical History:   Diagnosis Date   • Allergic rhinitis    • Anxiety    • Diverticulitis of large intestine without perforation or abscess without bleeding    • Dysthymic disorder    • Gastro-esophageal reflux disease without esophagitis    • HLD (hyperlipidemia)    • Hypertension    • Osteopenia    • Palpitations    • Paresthesia    • Stroke [facility]      Past Surgical History:   Procedure Laterality Date   • APPENDECTOMY     • CHOLECYSTECTOMY     • COLON SURGERY     • FL INJECTION RIGHT HIP (NON ARTHROGRAM)  8/28/2019   • HYSTERECTOMY        Family History:     Family History   Problem Relation Age of Onset   • Hypertension Mother    • Hypertension Father       Social History:     Social History     Socioeconomic History   • Marital status:       Spouse name: None   • Number of children: None   • Years of education: None   • Highest education level: None   Occupational History   • None   Tobacco Use   • Smoking status: Former   • Smokeless tobacco: Never   Vaping Use   • Vaping Use: Never used   Substance and Sexual Activity   • Alcohol use: Not Currently   • Drug use: Not Currently   • Sexual activity: Not Currently   Other Topics Concern   • None   Social History Narrative    Smoking: Unknown if ever smoked    Pets: None    As per [employer]     Social Determinants of Health     Financial Resource Strain: Low Risk    • Difficulty of Paying Living Expenses: Not hard at all   Food Insecurity: Not on file   Transportation Needs: No Transportation Needs   • Lack of Transportation (Medical): No   • Lack of Transportation (Non-Medical): No   Physical Activity: Not on file   Stress: Not on file   Social Connections: Not on file   Intimate Partner Violence: Not on file   Housing Stability: Not on file      Medications and Allergies:     Current Outpatient Medications   Medication Sig Dispense Refill   • amLODIPine (NORVASC) 5 mg tablet Take 1 tablet (5 mg total) by mouth daily 90 tablet 2   • aspirin 81 mg chewable tablet Chew 1 tablet (81 mg total) daily 30 tablet 0   • latanoprost (XALATAN) 0 005 % ophthalmic solution      • LORazepam (ATIVAN) 0 5 mg tablet Take 1 tablet (0 5 mg total) by mouth 2 (two) times a day 60 tablet 0   • metoprolol tartrate (LOPRESSOR) 25 mg tablet Take 1 tablet (25 mg total) by mouth 2 (two) times a day 180 tablet 0   • omeprazole (PriLOSEC) 20 mg delayed release capsule Take 1 capsule (20 mg total) by mouth daily 90 capsule 0   • atorvastatin (LIPITOR) 40 mg tablet TAKE ONE TABLET BY MOUTH ONE TIME DAILY IN THE EVENING  (Patient not taking: No sig reported) 30 tablet 0   • cyanocobalamin (CYANOCOBALAMIN) 100 MCG TABS Take 1 5 tablets (150 mcg total) by mouth daily 45 tablet 0   • docusate sodium (COLACE) 100 mg capsule Take 1 capsule (100 mg total) by mouth 2 (two) times a day 180 capsule 0   • meloxicam (MOBIC) 7 5 mg tablet Take 1 tablet (7 5 mg total) by mouth daily (Patient not taking: No sig reported) 30 tablet 0   • ondansetron (Zofran ODT) 4 mg disintegrating tablet Take 1 tablet (4 mg total) by mouth every 6 (six) hours as needed for nausea or vomiting 20 tablet 0   • polyethylene glycol (MIRALAX) 17 g packet Take 17 g by mouth as needed (constipation) for up to 10 doses 17 g 0     No current facility-administered medications for this visit       No Known Allergies   Immunizations:     Immunization History   Administered Date(s) Administered   • COVID-19 PFIZER VACCINE 0 3 ML IM 01/27/2021, 02/19/2021   • Influenza, high dose seasonal 0 7 mL 10/27/2020, 12/03/2021, 09/23/2022   • Pneumococcal Conjugate 13-Valent 09/11/2015   • Pneumococcal Polysaccharide PPV23 01/30/2003   • Td (adult), Unspecified 04/19/2011   • Td (adult), adsorbed 04/19/2011   • Zoster 11/17/2010      Health Maintenance:         Topic Date Due   • Colorectal Cancer Screening  04/04/2018         Topic Date Due   • Hepatitis B Vaccine (1 of 3 - 3-dose series) Never done   • COVID-19 Vaccine (3 - Booster for Pfizer series) 07/19/2021      Medicare Screening Tests and Risk Assessments:     Bethany Bolanos is here for her Subsequent Wellness visit  Last Medicare Wellness visit information reviewed, patient interviewed and updates made to the record today  Health Risk Assessment:   Patient rates overall health as fair  Patient feels that their physical health rating is same  Patient is dissatisfied with their life  Eyesight was rated as slightly worse  Hearing was rated as slightly worse  Patient feels that their emotional and mental health rating is same  Patients states they are never, rarely angry  Patient states they are often unusually tired/fatigued  Pain experienced in the last 7 days has been a lot  Patient's pain rating has been 8/10  Patient states that she has experienced no weight loss or gain in last 6 months  Fall Risk Screening: In the past year, patient has experienced: history of falling in past year    Injured during fall?: No    Feels unsteady when standing or walking?: Yes    Worried about falling?: Yes      Urinary Incontinence Screening:   Patient has leaked urine accidently in the last six months  Home Safety:  Patient has trouble with stairs inside or outside of their home  Patient has working smoke alarms and has working carbon monoxide detector  Home safety hazards include: none  Nutrition:   Current diet is Other (please comment)  Medications:   Patient is not currently taking any over-the-counter supplements  Patient is not able to manage medications  Daughter manages meds    Activities of Daily Living (ADLs)/Instrumental Activities of Daily Living (IADLs):   Walk and transfer into and out of bed and chair?: Yes  Dress and groom yourself?: Yes    Bathe or shower yourself?: Yes    Feed yourself? Yes  Do your laundry/housekeeping?: No  Manage your money, pay your bills and track your expenses?: No  Make your own meals?: Yes    Do your own shopping?: No    Durable Medical Equipment Suppliers  [employer]    Previous Hospitalizations:   Any hospitalizations or ED visits within the last 12 months?: Yes    How many hospitalizations have you had in the last year?: 1-2    Advance Care Planning:   Living will: Yes    Durable POA for healthcare: Yes    Advanced directive: Yes      Cognitive Screening:   Provider or family/friend/caregiver concerned regarding cognition?: No    PREVENTIVE SCREENINGS      Cardiovascular Screening:    General: Screening Not Indicated and History Lipid Disorder      Diabetes Screening:     General: Screening Current      Colorectal Cancer Screening:     General: Screening Not Indicated      Breast Cancer Screening:     General: Screening Not Indicated      Cervical Cancer Screening:    General: Screening Not Indicated      Osteoporosis Screening:    General: Risks and Benefits Discussed    Due for: DXA Axial and DXA Appendicular      Lung Cancer Screening:     General: Screening Not Indicated    Screening, Brief Intervention, and Referral to Treatment (SBIRT)    Screening  Typical number of drinks in a day: 0  Typical number of drinks in a week: 0  Interpretation: Low risk drinking behavior      AUDIT-C Screenin) How often did you have a drink containing alcohol in the past year? never  2) How many drinks did you have on a typical day when you were drinking in the past year? 0  3) How often did you have 6 or more drinks on one occasion in the past year? never    AUDIT-C Score: 0  Interpretation: Score 0-2 (female): Negative screen for alcohol misuse    Single Item Drug Screening:  How often have you used an illegal drug (including marijuana) or a prescription medication for non-medical reasons in the past year? never    Single Item Drug Screen Score: 0  Interpretation: Negative screen for possible drug use disorder    Other Counseling Topics:   Calcium and vitamin D intake and regular weightbearing exercise  No results found  Physical Exam:     /80 (BP Location: Left arm, Patient Position: Sitting, Cuff Size: Adult)   Pulse 77   Temp 99 2 °F (37 3 °C) (Temporal)   Ht 5' 3" (1 6 m)   Wt 46 7 kg (103 lb)   SpO2 99%   BMI 18 25 kg/m²     Physical Exam  Constitutional:       General: She is not in acute distress  Appearance: Normal appearance  She is normal weight  She is not ill-appearing, toxic-appearing or diaphoretic  Cardiovascular:      Rate and Rhythm: Normal rate and regular rhythm  Pulses: Normal pulses  Heart sounds: Normal heart sounds  No murmur heard  No gallop  Pulmonary:      Effort: Pulmonary effort is normal  No respiratory distress  Breath sounds: Normal breath sounds  No stridor  No wheezing, rhonchi or rales  Chest:      Chest wall: No tenderness  Abdominal:      General: Abdomen is flat  Bowel sounds are normal  There is no distension  Palpations: Abdomen is soft  There is no mass  Tenderness: There is no abdominal tenderness  There is no guarding  Hernia: No hernia is present  Musculoskeletal:         General: Normal range of motion  Skin:     General: Skin is warm and dry  Capillary Refill: Capillary refill takes less than 2 seconds  Neurological:      General: No focal deficit present  Mental Status: She is alert            Floyd Weeks MD

## 2022-12-20 DIAGNOSIS — F41.0 ANXIETY ATTACK: ICD-10-CM

## 2022-12-20 RX ORDER — LORAZEPAM 0.5 MG/1
0.5 TABLET ORAL 2 TIMES DAILY
Qty: 60 TABLET | Refills: 0 | Status: SHIPPED | OUTPATIENT
Start: 2022-12-20 | End: 2023-01-19

## 2022-12-29 ENCOUNTER — TELEPHONE (OUTPATIENT)
Dept: INTERNAL MEDICINE CLINIC | Facility: CLINIC | Age: 87
End: 2022-12-29

## 2022-12-29 NOTE — TELEPHONE ENCOUNTER
patients daughter called and states that mother is in a lot of pain in her hip and refuses physical therapy , states her quality of life is bad she just sits in pain , daughter would like to discus a plan of pain management as the tylenol is not helping  at all , she is concerned for  her mother , only relief is with the heating pad please give a call to daughter at [phone]

## 2023-01-16 DIAGNOSIS — I10 ESSENTIAL HYPERTENSION: ICD-10-CM

## 2023-01-16 DIAGNOSIS — K21.9 GASTROESOPHAGEAL REFLUX DISEASE WITHOUT ESOPHAGITIS: ICD-10-CM

## 2023-01-16 DIAGNOSIS — F41.0 ANXIETY ATTACK: ICD-10-CM

## 2023-01-16 RX ORDER — OMEPRAZOLE 20 MG/1
20 CAPSULE, DELAYED RELEASE ORAL DAILY
Qty: 90 CAPSULE | Refills: 2 | Status: SHIPPED | OUTPATIENT
Start: 2023-01-16

## 2023-01-16 RX ORDER — LORAZEPAM 0.5 MG/1
0.5 TABLET ORAL 2 TIMES DAILY
Qty: 60 TABLET | Refills: 0 | Status: SHIPPED | OUTPATIENT
Start: 2023-01-16 | End: 2023-02-15

## 2023-01-16 RX ORDER — AMLODIPINE BESYLATE 5 MG/1
5 TABLET ORAL DAILY
Qty: 90 TABLET | Refills: 2 | Status: SHIPPED | OUTPATIENT
Start: 2023-01-16 | End: 2023-04-16

## 2023-01-20 ENCOUNTER — HOSPITAL ENCOUNTER (INPATIENT)
Facility: HOSPITAL | Age: 88
LOS: 1 days | Discharge: HOME HEALTH | End: 2023-01-22
Attending: EMERGENCY MEDICINE | Admitting: INTERNAL MEDICINE

## 2023-01-20 ENCOUNTER — APPOINTMENT (EMERGENCY)
Dept: RADIOLOGY | Facility: HOSPITAL | Age: 88
End: 2023-01-20

## 2023-01-20 DIAGNOSIS — R26.2 AMBULATORY DYSFUNCTION: Primary | ICD-10-CM

## 2023-01-20 DIAGNOSIS — M16.11 OSTEOARTHRITIS OF RIGHT HIP: ICD-10-CM

## 2023-01-20 LAB
ALBUMIN SERPL BCP-MCNC: 4.2 G/DL (ref 3.5–5)
ALP SERPL-CCNC: 85 U/L (ref 46–116)
ALT SERPL W P-5'-P-CCNC: 22 U/L (ref 12–78)
ANION GAP SERPL CALCULATED.3IONS-SCNC: 7 MMOL/L (ref 4–13)
AST SERPL W P-5'-P-CCNC: 15 U/L (ref 5–45)
ATRIAL RATE: 86 BPM
BASOPHILS # BLD AUTO: 0.05 THOUSANDS/ÂΜL (ref 0–0.1)
BASOPHILS NFR BLD AUTO: 1 % (ref 0–1)
BILIRUB SERPL-MCNC: 0.53 MG/DL (ref 0.2–1)
BUN SERPL-MCNC: 14 MG/DL (ref 5–25)
CALCIUM SERPL-MCNC: 9.9 MG/DL (ref 8.3–10.1)
CHLORIDE SERPL-SCNC: 106 MMOL/L (ref 96–108)
CO2 SERPL-SCNC: 25 MMOL/L (ref 21–32)
CREAT SERPL-MCNC: 0.81 MG/DL (ref 0.6–1.3)
EOSINOPHIL # BLD AUTO: 0.14 THOUSAND/ÂΜL (ref 0–0.61)
EOSINOPHIL NFR BLD AUTO: 2 % (ref 0–6)
ERYTHROCYTE [DISTWIDTH] IN BLOOD BY AUTOMATED COUNT: 12.4 % (ref 11.6–15.1)
GFR SERPL CREATININE-BSD FRML MDRD: 65 ML/MIN/1.73SQ M
GLUCOSE SERPL-MCNC: 80 MG/DL (ref 65–140)
HCT VFR BLD AUTO: 41.2 % (ref 34.8–46.1)
HGB BLD-MCNC: 13.9 G/DL (ref 11.5–15.4)
IMM GRANULOCYTES # BLD AUTO: 0.02 THOUSAND/UL (ref 0–0.2)
IMM GRANULOCYTES NFR BLD AUTO: 0 % (ref 0–2)
LYMPHOCYTES # BLD AUTO: 1.85 THOUSANDS/ÂΜL (ref 0.6–4.47)
LYMPHOCYTES NFR BLD AUTO: 20 % (ref 14–44)
MCH RBC QN AUTO: 32 PG (ref 26.8–34.3)
MCHC RBC AUTO-ENTMCNC: 33.7 G/DL (ref 31.4–37.4)
MCV RBC AUTO: 95 FL (ref 82–98)
MONOCYTES # BLD AUTO: 0.54 THOUSAND/ÂΜL (ref 0.17–1.22)
MONOCYTES NFR BLD AUTO: 6 % (ref 4–12)
NEUTROPHILS # BLD AUTO: 6.82 THOUSANDS/ÂΜL (ref 1.85–7.62)
NEUTS SEG NFR BLD AUTO: 71 % (ref 43–75)
NRBC BLD AUTO-RTO: 0 /100 WBCS
P AXIS: 76 DEGREES
PLATELET # BLD AUTO: 250 THOUSANDS/UL (ref 149–390)
PMV BLD AUTO: 10.3 FL (ref 8.9–12.7)
POTASSIUM SERPL-SCNC: 4 MMOL/L (ref 3.5–5.3)
PR INTERVAL: 166 MS
PROT SERPL-MCNC: 7.6 G/DL (ref 6.4–8.4)
QRS AXIS: 3 DEGREES
QRSD INTERVAL: 82 MS
QT INTERVAL: 336 MS
QTC INTERVAL: 402 MS
RBC # BLD AUTO: 4.35 MILLION/UL (ref 3.81–5.12)
SODIUM SERPL-SCNC: 138 MMOL/L (ref 135–147)
T WAVE AXIS: 62 DEGREES
VENTRICULAR RATE: 86 BPM
WBC # BLD AUTO: 9.42 THOUSAND/UL (ref 4.31–10.16)

## 2023-01-20 RX ORDER — PANTOPRAZOLE SODIUM 40 MG/1
40 TABLET, DELAYED RELEASE ORAL
Status: DISCONTINUED | OUTPATIENT
Start: 2023-01-21 | End: 2023-01-22 | Stop reason: HOSPADM

## 2023-01-20 RX ORDER — LIDOCAINE 50 MG/G
1 PATCH TOPICAL ONCE
Status: COMPLETED | OUTPATIENT
Start: 2023-01-20 | End: 2023-01-21

## 2023-01-20 RX ORDER — LATANOPROST 50 UG/ML
1 SOLUTION/ DROPS OPHTHALMIC
Status: DISCONTINUED | OUTPATIENT
Start: 2023-01-20 | End: 2023-01-22 | Stop reason: HOSPADM

## 2023-01-20 RX ORDER — ENOXAPARIN SODIUM 100 MG/ML
40 INJECTION SUBCUTANEOUS DAILY
Status: DISCONTINUED | OUTPATIENT
Start: 2023-01-21 | End: 2023-01-22 | Stop reason: HOSPADM

## 2023-01-20 RX ORDER — LORAZEPAM 0.5 MG/1
0.5 TABLET ORAL DAILY PRN
Status: DISCONTINUED | OUTPATIENT
Start: 2023-01-20 | End: 2023-01-22 | Stop reason: HOSPADM

## 2023-01-20 RX ORDER — LIDOCAINE 50 MG/G
1 PATCH TOPICAL DAILY
Status: DISCONTINUED | OUTPATIENT
Start: 2023-01-21 | End: 2023-01-22 | Stop reason: HOSPADM

## 2023-01-20 RX ORDER — ACETAMINOPHEN 325 MG/1
975 TABLET ORAL EVERY 8 HOURS SCHEDULED
Status: DISCONTINUED | OUTPATIENT
Start: 2023-01-20 | End: 2023-01-22 | Stop reason: HOSPADM

## 2023-01-20 RX ORDER — AMLODIPINE BESYLATE 5 MG/1
5 TABLET ORAL DAILY
Status: DISCONTINUED | OUTPATIENT
Start: 2023-01-21 | End: 2023-01-21

## 2023-01-20 RX ADMIN — METOPROLOL TARTRATE 25 MG: 25 TABLET, FILM COATED ORAL at 23:01

## 2023-01-20 RX ADMIN — ACETAMINOPHEN 975 MG: 325 TABLET, FILM COATED ORAL at 23:01

## 2023-01-20 RX ADMIN — LATANOPROST 1 DROP: 50 SOLUTION OPHTHALMIC at 23:01

## 2023-01-20 RX ADMIN — LIDOCAINE 5% 1 PATCH: 700 PATCH TOPICAL at 17:34

## 2023-01-20 NOTE — ED PROVIDER NOTES
History  Chief Complaint   Patient presents with   • Difficulty Walking     Pt reports history bone on bone hip joint on right side  Pt reports unable to walk around house and decreased activity, and per pt daughter weight loss  81 y o F w/ h/o Osteopenia, HTN, HLD, presents to the emergency department due to ambulatory dysfunction with increased right hip pain  Patient historically has had diagnosis of osteoarthritis that is led to bone-on-bone connection in her right hip  She did not want to have surgery done on this so she was treated symptomatically with steroid injections but over time these did not work as well as they used to  In the past 2 months, she has had increasing pain and at this point refuses to ambulate until her daughter makes her  She has been living with her daughter for the past 2 years  No recent trauma  The pain starts in the right hip and radiates to the right knee but does not go further  She has been taking Tylenol without significant improvement  She talked to her family doctor who told her to come to the hospital for further pain management and evaluation of her ambulatory dysfunction  Prior to Admission Medications   Prescriptions Last Dose Informant Patient Reported? Taking?    LORazepam (ATIVAN) 0 5 mg tablet 1/20/2023  No Yes   Sig: Take 1 tablet (0 5 mg total) by mouth 2 (two) times a day   amLODIPine (NORVASC) 5 mg tablet 1/20/2023  No Yes   Sig: Take 1 tablet (5 mg total) by mouth daily   aspirin 81 mg chewable tablet Not Taking  No No   Sig: Chew 1 tablet (81 mg total) daily   Patient not taking: Reported on 1/20/2023   atorvastatin (LIPITOR) 40 mg tablet   No No   Sig: TAKE ONE TABLET BY MOUTH ONE TIME DAILY IN THE EVENING    Patient not taking: No sig reported   cyanocobalamin (CYANOCOBALAMIN) 100 MCG TABS   No No   Sig: Take 1 5 tablets (150 mcg total) by mouth daily   docusate sodium (COLACE) 100 mg capsule   No No   Sig: Take 1 capsule (100 mg total) by mouth 2 (two) times a day   latanoprost (XALATAN) 0 005 % ophthalmic solution 1/20/2023  Yes Yes   meloxicam (MOBIC) 7 5 mg tablet   No No   Sig: Take 1 tablet (7 5 mg total) by mouth daily   Patient not taking: No sig reported   metoprolol tartrate (LOPRESSOR) 25 mg tablet 1/20/2023  No Yes   Sig: Take 1 tablet (25 mg total) by mouth 2 (two) times a day   omeprazole (PriLOSEC) 20 mg delayed release capsule 1/20/2023  No Yes   Sig: Take 1 capsule (20 mg total) by mouth daily   ondansetron (Zofran ODT) 4 mg disintegrating tablet   No No   Sig: Take 1 tablet (4 mg total) by mouth every 6 (six) hours as needed for nausea or vomiting   polyethylene glycol (MIRALAX) 17 g packet   No No   Sig: Take 17 g by mouth as needed (constipation) for up to 10 doses      Facility-Administered Medications: None       Past Medical History:   Diagnosis Date   • Allergic rhinitis    • Anxiety    • Diverticulitis of large intestine without perforation or abscess without bleeding    • Dysthymic disorder    • Gastro-esophageal reflux disease without esophagitis    • HLD (hyperlipidemia)    • Hypertension    • Osteopenia    • Palpitations    • Paresthesia    • Stroke [facility])        Past Surgical History:   Procedure Laterality Date   • APPENDECTOMY     • CHOLECYSTECTOMY     • COLON SURGERY     • FL INJECTION RIGHT HIP (NON ARTHROGRAM)  8/28/2019   • HYSTERECTOMY         Family History   Problem Relation Age of Onset   • Hypertension Mother    • Hypertension Father      I have reviewed and agree with the history as documented      E-Cigarette/Vaping   • E-Cigarette Use Never User      E-Cigarette/Vaping Substances   • Nicotine No    • THC No    • CBD No    • Flavoring No    • Other No    • Unknown No      Social History     Tobacco Use   • Smoking status: Former   • Smokeless tobacco: Never   Vaping Use   • Vaping Use: Never used   Substance Use Topics   • Alcohol use: Not Currently   • Drug use: Not Currently        Review of Systems   All other systems reviewed and are negative  Physical Exam  ED Triage Vitals [01/20/23 1331]   Temperature Pulse Respirations Blood Pressure SpO2   97 8 °F (36 6 °C) 82 18 151/99 96 %      Temp Source Heart Rate Source Patient Position - Orthostatic VS BP Location FiO2 (%)   Oral Monitor Sitting Left arm --      Pain Score       10 - Worst Possible Pain             Orthostatic Vital Signs  Vitals:    01/21/23 1200 01/21/23 1600 01/21/23 1727 01/21/23 2029   BP: 157/67 143/69 (!) 171/77 142/68   Pulse: 65 64 66 76   Patient Position - Orthostatic VS:    Lying       Physical Exam  Vitals and nursing note reviewed  Constitutional:       General: She is not in acute distress  Appearance: She is well-developed  HENT:      Head: Normocephalic and atraumatic  Right Ear: External ear normal       Left Ear: External ear normal    Eyes:      Conjunctiva/sclera: Conjunctivae normal    Cardiovascular:      Rate and Rhythm: Normal rate and regular rhythm  Heart sounds: No murmur heard  Pulmonary:      Effort: Pulmonary effort is normal  No respiratory distress  Breath sounds: Normal breath sounds  Abdominal:      Palpations: Abdomen is soft  Tenderness: There is no abdominal tenderness  Musculoskeletal:         General: Tenderness ( Palpation of right hip) present  No swelling  Cervical back: Neck supple  Comments: Audible clicking with passive flexion of right hip   Skin:     General: Skin is warm and dry  Capillary Refill: Capillary refill takes less than 2 seconds  Neurological:      General: No focal deficit present  Mental Status: She is alert  Sensory: No sensory deficit  Motor: No weakness     Psychiatric:         Mood and Affect: Mood normal          ED Medications  Medications   latanoprost (XALATAN) 0 005 % ophthalmic solution 1 drop (1 drop Both Eyes Given 1/20/23 2301)   metoprolol tartrate (LOPRESSOR) tablet 25 mg (25 mg Oral Given 1/21/23 1727) pantoprazole (PROTONIX) EC tablet 40 mg (40 mg Oral Given 1/21/23 0601)   enoxaparin (LOVENOX) subcutaneous injection 40 mg (40 mg Subcutaneous Given 1/21/23 0831)   lidocaine (LIDODERM) 5 % patch 1 patch (1 patch Topical Medication Applied 1/21/23 1730)   acetaminophen (TYLENOL) tablet 975 mg (975 mg Oral Given 1/21/23 1355)   LORazepam (ATIVAN) tablet 0 5 mg (has no administration in time range)   amLODIPine (NORVASC) tablet 10 mg (10 mg Oral Given 1/21/23 0830)   Diclofenac Sodium (VOLTAREN) 1 % topical gel 2 g (2 g Topical Given 1/21/23 1731)   lidocaine (LIDODERM) 5 % patch 1 patch (1 patch Topical Patch Removed 1/21/23 0608)       Diagnostic Studies  Results Reviewed     Procedure Component Value Units Date/Time    Basic metabolic panel [[id number]]  (Abnormal) Collected: 01/21/23 0606    Lab Status: Final result Specimen: Blood from Arm, Left Updated: 01/21/23 0631     Sodium 141 mmol/L      Potassium 3 8 mmol/L      Chloride 109 mmol/L      CO2 27 mmol/L      ANION GAP 5 mmol/L      BUN 15 mg/dL      Creatinine 0 78 mg/dL      Glucose 89 mg/dL      Glucose, Fasting 89 mg/dL      Calcium 9 3 mg/dL      eGFR 68 ml/min/1 73sq m     Narrative:      [place] guidelines for Chronic Kidney Disease (CKD):   •  Stage 1 with normal or high GFR (GFR > 90 mL/min/1 73 square meters)  •  Stage 2 Mild CKD (GFR = 60-89 mL/min/1 73 square meters)  •  Stage 3A Moderate CKD (GFR = 45-59 mL/min/1 73 square meters)  •  Stage 3B Moderate CKD (GFR = 30-44 mL/min/1 73 square meters)  •  Stage 4 Severe CKD (GFR = 15-29 mL/min/1 73 square meters)  •  Stage 5 End Stage CKD (GFR <15 mL/min/1 73 square meters)  Note: GFR calculation is accurate only with a steady state creatinine    CBC (With Platelets) [[id number]]  (Normal) Collected: 01/21/23 0606    Lab Status: Final result Specimen: Blood from Arm, Left Updated: 01/21/23 0624     WBC 7 05 Thousand/uL      RBC 4 06 Million/uL      Hemoglobin 13 3 g/dL Hematocrit 39 3 %      MCV 97 fL      MCH 32 8 pg      MCHC 33 8 g/dL      RDW 12 4 %      Platelets 969 Thousands/uL      MPV 10 2 fL     Comprehensive metabolic panel [[id number]] Collected: 01/20/23 1734    Lab Status: Final result Specimen: Blood from Arm, Left Updated: 01/20/23 1813     Sodium 138 mmol/L      Potassium 4 0 mmol/L      Chloride 106 mmol/L      CO2 25 mmol/L      ANION GAP 7 mmol/L      BUN 14 mg/dL      Creatinine 0 81 mg/dL      Glucose 80 mg/dL      Calcium 9 9 mg/dL      AST 15 U/L      ALT 22 U/L      Alkaline Phosphatase 85 U/L      Total Protein 7 6 g/dL      Albumin 4 2 g/dL      Total Bilirubin 0 53 mg/dL      eGFR 65 ml/min/1 73sq m     Narrative:      [place] guidelines for Chronic Kidney Disease (CKD):   •  Stage 1 with normal or high GFR (GFR > 90 mL/min/1 73 square meters)  •  Stage 2 Mild CKD (GFR = 60-89 mL/min/1 73 square meters)  •  Stage 3A Moderate CKD (GFR = 45-59 mL/min/1 73 square meters)  •  Stage 3B Moderate CKD (GFR = 30-44 mL/min/1 73 square meters)  •  Stage 4 Severe CKD (GFR = 15-29 mL/min/1 73 square meters)  •  Stage 5 End Stage CKD (GFR <15 mL/min/1 73 square meters)  Note: GFR calculation is accurate only with a steady state creatinine    CBC and differential [[id number]] Collected: 01/20/23 1734    Lab Status: Final result Specimen: Blood from Arm, Left Updated: 01/20/23 1749     WBC 9 42 Thousand/uL      RBC 4 35 Million/uL      Hemoglobin 13 9 g/dL      Hematocrit 41 2 %      MCV 95 fL      MCH 32 0 pg      MCHC 33 7 g/dL      RDW 12 4 %      MPV 10 3 fL      Platelets 374 Thousands/uL      nRBC 0 /100 WBCs      Neutrophils Relative 71 %      Immat GRANS % 0 %      Lymphocytes Relative 20 %      Monocytes Relative 6 %      Eosinophils Relative 2 %      Basophils Relative 1 %      Neutrophils Absolute 6 82 Thousands/µL      Immature Grans Absolute 0 02 Thousand/uL      Lymphocytes Absolute 1 85 Thousands/µL      Monocytes Absolute 0 54 Thousand/µL      Eosinophils Absolute 0 14 Thousand/µL      Basophils Absolute 0 05 Thousands/µL                  XR hip/pelv 2-3 vws right   ED Interpretation by Stuart Poe MD (01/20 1826)   Severe bone-on-bone DJD of the right hip  Final Result by Ronny Infante DO (01/21 9622)      No acute osseous abnormality  Severe right hip osteoarthritis  Workstation performed: [id number]               Procedures  Procedures      ED Course               Identification of Seniors at [address] [place] [address] Most Recent Value   (ISAR) Identification of Seniors at Risk    Before the illness or injury that brought you to the Emergency, did you need someone to help you on a regular basis? 0 Filed at: 01/20/2023 1334   In the last 24 hours, have you needed more help than usual? 0 Filed at: 01/20/2023 1334   Have you been hospitalized for one or more nights during the past 6 months? 1 Filed at: 01/20/2023 1334   In general, do you see well? 0 Filed at: 01/20/2023 1334   In general, do you have serious problems with your memory? 0 Filed at: 01/20/2023 1334   Do you take more than three different medications every day? 1 Filed at: 01/20/2023 1334   ISAR Score 2 Filed at: 01/20/2023 1334                    SBIRT 22yo+    Flowsheet Row Most Recent Value   SBIRT (23 yo +)    In order to provide better care to our patients, we are screening all of our patients for alcohol and drug use  Would it be okay to ask you these screening questions? Yes Filed at: 01/20/2023 1714   Initial Alcohol Screen: US AUDIT-C     1  How often do you have a drink containing alcohol? 0 Filed at: 01/20/2023 1714   2  How many drinks containing alcohol do you have on a typical day you are drinking? 0 Filed at: 01/20/2023 1714   3a  Male UNDER 65: How often do you have five or more drinks on one occasion? 0 Filed at: 01/20/2023 1714   3b  FEMALE Any Age, or MALE 65+: How often do you have 4 or more drinks on one occassion?  0 Filed at: 01/20/2023 1714   Audit-C Score 0 Filed at: 01/20/2023 1714   JACKLYN: How many times in the past year have you    Used an illegal drug or used a prescription medication for non-medical reasons? Never Filed at: 01/20/2023 1714                Medical Decision Making  64-year-old female with history of osteoarthritis of the right hip presenting due to reported recommendations by primary care doctor to be admitted for pain control and ambulatory dysfunction  Will obtain x-rays of the hip  Concerned that there may be underlying fracture on top of known osteoarthritis secondary to patient's increase in pain recently  Will obtain screening labs for admission  X-ray shows advanced osteoarthritis but no acute fractures  Patient was admitted for further management  Ambulatory dysfunction: self-limited or minor problem  Osteoarthritis of right hip: acute illness or injury  Amount and/or Complexity of Data Reviewed  Labs: ordered  Radiology: ordered and independent interpretation performed  Risk  Prescription drug management  Decision regarding hospitalization  Disposition  Final diagnoses:   Ambulatory dysfunction   Osteoarthritis of right hip     Time reflects when diagnosis was documented in both MDM as applicable and the Disposition within this note     Time User Action Codes Description Comment    1/20/2023  6:42 PM Giuseppe Mora [R26 2] Ambulatory dysfunction     1/20/2023  6:43 PM Giuseppe Mora [M16 11] Osteoarthritis of right hip       ED Disposition     ED Disposition   Admit    Condition   Stable    Date/Time   Fri Jan 20, 2023  6:42 PM    Comment   Case was discussed with Dr Abdiel Segovia and the patient's admission status was agreed to be Admission Status: inpatient status to the service of Dr Pasquale Figueroa   Follow-up Information    None         Patient's Medications   Discharge Prescriptions    No medications on file     No discharge procedures on file      PDMP Review       Value Time User    PDMP Reviewed  Yes 11/22/2022  9:01 AM Mechelle Leija MD           ED Provider  Attending physically available and evaluated Mayank Valdes  NICKY managed the patient along with the ED Attending      Electronically Signed by         Tabitha Pérez MD  01/21/23 4623

## 2023-01-20 NOTE — ED ATTENDING ATTESTATION
1/20/2023  IToño MD, saw and evaluated the patient  I have discussed the patient with the resident/non-physician practitioner and agree with the resident's/non-physician practitioner's findings, Plan of Care, and MDM as documented in the resident's/non-physician practitioner's note, except where noted  All available labs and Radiology studies were reviewed  I was present for key portions of any procedure(s) performed by the resident/non-physician practitioner and I was immediately available to provide assistance  At this point I agree with the current assessment done in the Emergency Department  I have conducted an independent evaluation of this patient a history and physical is as follows:    ED Course     45-year-old female, history of severe degenerative joint disease of the right hip, referred to the emergency department by her primary care physician for admission for pain management consultation  Patient denies any acute trauma  Patient states in the past she has been treated with steroid injections without improvement on the last injection  Patient is not interested in total hip replacement  On examination moderate crepitance with range of motion of the right hip  Tender to palpation  Distal pulses intact  Distal motor and sensory intact  XR hip/pelv 2-3 vws right   ED Interpretation   Severe bone-on-bone DJD of the right hip  X-ray was independently visualized by myself and interpreted as severe degenerative joint disease      Labs Reviewed   CBC AND DIFFERENTIAL       Result Value Ref Range Status    WBC 9 42  4 31 - 10 16 Thousand/uL Final    RBC 4 35  3 81 - 5 12 Million/uL Final    Hemoglobin 13 9  11 5 - 15 4 g/dL Final    Hematocrit 41 2  34 8 - 46 1 % Final    MCV 95  82 - 98 fL Final    MCH 32 0  26 8 - 34 3 pg Final    MCHC 33 7  31 4 - 37 4 g/dL Final    RDW 12 4  11 6 - 15 1 % Final    MPV 10 3  8 9 - 12 7 fL Final    Platelets 613  636 - 390 Thousands/uL Final    nRBC 0  /100 WBCs Final    Neutrophils Relative 71  43 - 75 % Final    Immat GRANS % 0  0 - 2 % Final    Lymphocytes Relative 20  14 - 44 % Final    Monocytes Relative 6  4 - 12 % Final    Eosinophils Relative 2  0 - 6 % Final    Basophils Relative 1  0 - 1 % Final    Neutrophils Absolute 6 82  1 85 - 7 62 Thousands/µL Final    Immature Grans Absolute 0 02  0 00 - 0 20 Thousand/uL Final    Lymphocytes Absolute 1 85  0 60 - 4 47 Thousands/µL Final    Monocytes Absolute 0 54  0 17 - 1 22 Thousand/µL Final    Eosinophils Absolute 0 14  0 00 - 0 61 Thousand/µL Final    Basophils Absolute 0 05  0 00 - 0 10 Thousands/µL Final   COMPREHENSIVE METABOLIC PANEL    Sodium 077  135 - 147 mmol/L Final    Potassium 4 0  3 5 - 5 3 mmol/L Final    Chloride 106  96 - 108 mmol/L Final    CO2 25  21 - 32 mmol/L Final    ANION GAP 7  4 - 13 mmol/L Final    BUN 14  5 - 25 mg/dL Final    Creatinine 0 81  0 60 - 1 30 mg/dL Final    Comment: Standardized to IDMS reference method    Glucose 80  65 - 140 mg/dL Final    Comment: If the patient is fasting, the ADA then defines impaired fasting glucose as > 100 mg/dL and diabetes as > or equal to 123 mg/dL  Specimen collection should occur prior to Sulfasalazine administration due to the potential for falsely depressed results  Specimen collection should occur prior to Sulfapyridine administration due to the potential for falsely elevated results  Calcium 9 9  8 3 - 10 1 mg/dL Final    AST 15  5 - 45 U/L Final    Comment: Specimen collection should occur prior to Sulfasalazine administration due to the potential for falsely depressed results  ALT 22  12 - 78 U/L Final    Comment: Specimen collection should occur prior to Sulfasalazine and/or Sulfapyridine administration due to the potential for falsely depressed results       Alkaline Phosphatase 85  46 - 116 U/L Final    Total Protein 7 6  6 4 - 8 4 g/dL Final    Albumin 4 2  3 5 - 5 0 g/dL Final    Total Bilirubin 0 53  0 20 - 1 00 mg/dL Final    Comment: Use of this assay is not recommended for patients undergoing treatment with eltrombopag due to the potential for falsely elevated results  eGFR 65  ml/min/1 73sq m Final    Narrative:     [place] guidelines for Chronic Kidney Disease (CKD):   •  Stage 1 with normal or high GFR (GFR > 90 mL/min/1 73 square meters)  •  Stage 2 Mild CKD (GFR = 60-89 mL/min/1 73 square meters)  •  Stage 3A Moderate CKD (GFR = 45-59 mL/min/1 73 square meters)  •  Stage 3B Moderate CKD (GFR = 30-44 mL/min/1 73 square meters)  •  Stage 4 Severe CKD (GFR = 15-29 mL/min/1 73 square meters)  •  Stage 5 End Stage CKD (GFR <15 mL/min/1 73 square meters)  Note: GFR calculation is accurate only with a steady state creatinine   Lab work was reviewed by myself and has no significant abnormalities  Patient will be admitted to the medical service of Dr Will Goodman for pain management consultation      Critical Care Time  Procedures

## 2023-01-21 LAB
ANION GAP SERPL CALCULATED.3IONS-SCNC: 5 MMOL/L (ref 4–13)
BUN SERPL-MCNC: 15 MG/DL (ref 5–25)
CALCIUM SERPL-MCNC: 9.3 MG/DL (ref 8.3–10.1)
CHLORIDE SERPL-SCNC: 109 MMOL/L (ref 96–108)
CO2 SERPL-SCNC: 27 MMOL/L (ref 21–32)
CREAT SERPL-MCNC: 0.78 MG/DL (ref 0.6–1.3)
ERYTHROCYTE [DISTWIDTH] IN BLOOD BY AUTOMATED COUNT: 12.4 % (ref 11.6–15.1)
GFR SERPL CREATININE-BSD FRML MDRD: 68 ML/MIN/1.73SQ M
GLUCOSE P FAST SERPL-MCNC: 89 MG/DL (ref 65–99)
GLUCOSE SERPL-MCNC: 89 MG/DL (ref 65–140)
HCT VFR BLD AUTO: 39.3 % (ref 34.8–46.1)
HGB BLD-MCNC: 13.3 G/DL (ref 11.5–15.4)
MCH RBC QN AUTO: 32.8 PG (ref 26.8–34.3)
MCHC RBC AUTO-ENTMCNC: 33.8 G/DL (ref 31.4–37.4)
MCV RBC AUTO: 97 FL (ref 82–98)
PLATELET # BLD AUTO: 241 THOUSANDS/UL (ref 149–390)
PMV BLD AUTO: 10.2 FL (ref 8.9–12.7)
POTASSIUM SERPL-SCNC: 3.8 MMOL/L (ref 3.5–5.3)
RBC # BLD AUTO: 4.06 MILLION/UL (ref 3.81–5.12)
SODIUM SERPL-SCNC: 141 MMOL/L (ref 135–147)
WBC # BLD AUTO: 7.05 THOUSAND/UL (ref 4.31–10.16)

## 2023-01-21 RX ORDER — AMLODIPINE BESYLATE 10 MG/1
10 TABLET ORAL DAILY
Status: DISCONTINUED | OUTPATIENT
Start: 2023-01-21 | End: 2023-01-22 | Stop reason: HOSPADM

## 2023-01-21 RX ADMIN — ACETAMINOPHEN 975 MG: 325 TABLET, FILM COATED ORAL at 06:01

## 2023-01-21 RX ADMIN — ENOXAPARIN SODIUM 40 MG: 40 INJECTION SUBCUTANEOUS at 08:31

## 2023-01-21 RX ADMIN — ACETAMINOPHEN 975 MG: 325 TABLET, FILM COATED ORAL at 13:55

## 2023-01-21 RX ADMIN — METOPROLOL TARTRATE 25 MG: 25 TABLET, FILM COATED ORAL at 17:27

## 2023-01-21 RX ADMIN — LIDOCAINE 5% 1 PATCH: 700 PATCH TOPICAL at 17:30

## 2023-01-21 RX ADMIN — AMLODIPINE BESYLATE 10 MG: 10 TABLET ORAL at 08:30

## 2023-01-21 RX ADMIN — ACETAMINOPHEN 975 MG: 325 TABLET, FILM COATED ORAL at 22:00

## 2023-01-21 RX ADMIN — METOPROLOL TARTRATE 25 MG: 25 TABLET, FILM COATED ORAL at 08:31

## 2023-01-21 RX ADMIN — DICLOFENAC SODIUM 2 G: 10 GEL TOPICAL at 17:31

## 2023-01-21 RX ADMIN — PANTOPRAZOLE SODIUM 40 MG: 40 TABLET, DELAYED RELEASE ORAL at 06:01

## 2023-01-21 RX ADMIN — DICLOFENAC SODIUM 2 G: 10 GEL TOPICAL at 12:30

## 2023-01-21 NOTE — ASSESSMENT & PLAN NOTE
/99 on presentation  Noted increased to 221/102 however this was when she was transported from the ED waiting room to an ED bed  Suspect that this was related to pain  Blood pressure 133/62, pulse 67, temperature 97 8 °F (36 6 °C), temperature source Oral, resp  rate 20, height 5' (1 524 m), weight 45 8 kg (101 lb), SpO2 96 %      Current pressures reviewed and are acceptable    · Continue home Lopressor 25 mg twice daily  · Increase amlodipine from 5 to 10 mg daily  · Continue to monitor blood pressure

## 2023-01-21 NOTE — ASSESSMENT & PLAN NOTE
Home regimen: Omeprazole 20 mg     · Protonix 40 mg while inpatient as omeprazole is not on hospital formulary

## 2023-01-21 NOTE — ASSESSMENT & PLAN NOTE
Patient states she takes Ativan 0 5 mg once a day as needed  PDMP reviewed      · As needed Ativan 0 5 mg daily

## 2023-01-21 NOTE — ASSESSMENT & PLAN NOTE
Presents with ambulatory dysfunction secondary to severe right hip osteoarthritis  Baseline ambulation with walker  However, patient states that ambulation is very difficult now due to pain  No falls  PCP recommended patient come to the hospital for PT/OT evaluation  Right hip x-ray shows severe bone-on-bone hip osteoarthritis  On exam, hip flexion limited secondary to pain, positive right log roll        · Pain regimen: Tylenol 975 mg 3 times daily, lidocaine patch daily, aqua K  · PT/OT evaluations  · Fall precautions

## 2023-01-21 NOTE — H&P
INTERNAL MEDICINE RESIDENCY ADMISSION H&P     Name: Lisha Riojas   Age & Sex: 81 y o  female   MRN: [id number]  Unit/Bed#: ED 20   Encounter: [id number]  Primary Care Provider: Clary House MD    Code Status: Level 1 - Full Code  Admission Status: OBSERVATION  Disposition: Patient requires Med/Surg    Admit to team: SOD Team B     ASSESSMENT/PLAN     Principal Problem:    Ambulatory dysfunction  Active Problems:    Essential hypertension    GERD (gastroesophageal reflux disease)    Anxiety      * Ambulatory dysfunction  Assessment & Plan  Presents with ambulatory dysfunction secondary to severe right hip osteoarthritis  Baseline ambulation with walker  However, patient states that ambulation is very difficult now due to pain  No falls  PCP recommended patient come to the hospital for PT/OT evaluation  Right hip x-ray shows severe bone-on-bone hip osteoarthritis  On exam, hip flexion limited secondary to pain, positive right log roll  · Pain regimen: Tylenol 975 mg 3 times daily, lidocaine patch daily, aqua K  · PT/OT evaluations  · Fall precautions    Anxiety  Assessment & Plan  Patient states she takes Ativan 0 5 mg once a day as needed  PDMP reviewed  · As needed Ativan 0 5 mg daily    GERD (gastroesophageal reflux disease)  Assessment & Plan  Home regimen: Omeprazole 20 mg     · Protonix 40 mg while inpatient as omeprazole is not on hospital formulary    Essential hypertension  Assessment & Plan  /99 on presentation  Noted increased to 221/102 however this was when she was transported from the ED waiting room to an ED bed  Suspect that this was related to pain  BP decreased to 136/67 without antihypertensives      · Continue home amlodipine 5 mg, Lopressor 25 mg twice daily  · Continue to monitor blood pressure      VTE Pharmacologic Prophylaxis: Enoxaparin (Lovenox)  VTE Mechanical Prophylaxis: sequential compression device    CHIEF COMPLAINT     Chief Complaint   Patient presents with   • Difficulty Walking     Pt reports history bone on bone hip joint on right side  Pt reports unable to walk around house and decreased activity, and per pt daughter weight loss  HISTORY OF PRESENT ILLNESS     81year old female with a history of hypertension, anxiety, right hip osteoarthritis, osteoporosis, HLD who presented with ambulatory dysfunction  Patient has chronic right hip pain due to severe arthritis  She has tried steroid injections with no relief  She does not want surgical interventions  Pain has made ambulation very difficult  Tylenol and heat with minimal relief  Pain is nonradiating and aggravated by walking  States she has neuropathy of bilateral feet  No falls  Uses walker at baseline  Lives with daughter for past 2 years  Quit smoking over 70 years ago  No alcohol use  No surgeries  Vitals on admission: 97 8 F, /99, pulse 82, R 18, 96% on room air  CBC and CMP unremarkable  Right hip x-ray shows severe bone-on-bone hip osteoarthritis  REVIEW OF SYSTEMS     Review of Systems   Constitutional: Negative for appetite change, chills, diaphoresis and fever  HENT: Negative for congestion, ear pain, rhinorrhea and sore throat  Eyes: Negative for visual disturbance  Respiratory: Negative for cough and shortness of breath  Cardiovascular: Negative for chest pain, palpitations and leg swelling  Gastrointestinal: Negative for abdominal pain, constipation, diarrhea, nausea and vomiting  Genitourinary: Negative for difficulty urinating, dysuria and hematuria  Musculoskeletal: Positive for arthralgias and gait problem  Negative for back pain  Neurological: Positive for numbness  Negative for dizziness, weakness, light-headedness and headaches       OBJECTIVE     Vitals:    01/20/23 1800 01/20/23 2000 01/20/23 2230 01/21/23 0030   BP: (!) 196/90 (!) 185/89 146/67 137/62   BP Location: Right arm Right arm Right arm Right arm   Pulse: 100 90 89 62 Resp: 18 22 20 19   Temp:       TempSrc:       SpO2: 96% 96% 93% 94%   Weight:       Height:          Temperature:   Temp (24hrs), Av 8 °F (36 6 °C), Min:97 8 °F (36 6 °C), Max:97 8 °F (36 6 °C)    Temperature: 97 8 °F (36 6 °C)  Intake & Output:  I/O     None        Weights:   IBW (Ideal Body Weight): 45 5 kg    Body mass index is 19 73 kg/m²  Weight (last 2 days)     Date/Time Weight    23 1331 45 8 (101)        Physical Exam  Vitals reviewed  Constitutional:       General: She is not in acute distress  HENT:      Head: Normocephalic and atraumatic  Nose: No rhinorrhea  Eyes:      General: No scleral icterus  Cardiovascular:      Rate and Rhythm: Normal rate and regular rhythm  Pulses: Normal pulses  Heart sounds: Normal heart sounds  No murmur heard  No friction rub  No gallop  Pulmonary:      Effort: Pulmonary effort is normal  No respiratory distress  Breath sounds: Normal breath sounds  No wheezing, rhonchi or rales  Abdominal:      General: Bowel sounds are normal  There is no distension  Palpations: Abdomen is soft  There is no mass  Tenderness: There is no abdominal tenderness  There is no guarding  Hernia: No hernia is present  Musculoskeletal:      Right lower leg: No edema  Left lower leg: No edema  Comments: Right hip flexion limited by pain, positive right log roll   Skin:     General: Skin is warm and dry  Capillary Refill: Capillary refill takes less than 2 seconds  Coloration: Skin is not jaundiced  Comments: Lidocaine patch over right hip   Neurological:      Mental Status: She is alert        Comments: CN 2-12 grossly intact, moves all 4 extremities, no dysarthria, sensation of lower extremities grossly intact   Psychiatric:         Mood and Affect: Mood normal          Behavior: Behavior normal        PAST MEDICAL HISTORY     Past Medical History:   Diagnosis Date   • Allergic rhinitis    • Anxiety    • Diverticulitis of large intestine without perforation or abscess without bleeding    • Dysthymic disorder    • Gastro-esophageal reflux disease without esophagitis    • HLD (hyperlipidemia)    • Hypertension    • Osteopenia    • Palpitations    • Paresthesia    • Stroke [facility])      PAST SURGICAL HISTORY     Past Surgical History:   Procedure Laterality Date   • APPENDECTOMY     • CHOLECYSTECTOMY     • COLON SURGERY     • FL INJECTION RIGHT HIP (NON ARTHROGRAM)  8/28/2019   • HYSTERECTOMY       SOCIAL & FAMILY HISTORY     Social History     Substance and Sexual Activity   Alcohol Use Not Currently       Social History     Substance and Sexual Activity   Drug Use Not Currently     Social History     Tobacco Use   Smoking Status Former   Smokeless Tobacco Never     Family History   Problem Relation Age of Onset   • Hypertension Mother    • Hypertension Father      LABORATORY DATA     Labs: I have personally reviewed pertinent reports  Results from last 7 days   Lab Units 01/20/23  1734   WBC Thousand/uL 9 42   HEMOGLOBIN g/dL 13 9   HEMATOCRIT % 41 2   PLATELETS Thousands/uL 250   NEUTROS PCT % 71   MONOS PCT % 6      Results from last 7 days   Lab Units 01/20/23  1734   POTASSIUM mmol/L 4 0   CHLORIDE mmol/L 106   CO2 mmol/L 25   BUN mg/dL 14   CREATININE mg/dL 0 81   CALCIUM mg/dL 9 9   ALK PHOS U/L 85   ALT U/L 22   AST U/L 15                          Micro:  No results found for: BLOODCX, URINECX, WOUNDCULT, SPUTUMCULTUR  IMAGING & DIAGNOSTIC TESTS     Imaging: I have personally reviewed pertinent reports  No results found  EKG, Pathology, and Other Studies: I have personally reviewed pertinent reports  ALLERGIES   No Known Allergies  MEDICATIONS PRIOR TO ARRIVAL     Prior to Admission medications    Medication Sig Start Date End Date Taking?  Authorizing Provider   amLODIPine (NORVASC) 5 mg tablet Take 1 tablet (5 mg total) by mouth daily 1/16/23 4/16/23  Young Cerna MD   LORazepam (ATIVAN) 0 5 mg tablet Take 1 tablet (0 5 mg total) by mouth 2 (two) times a day 1/16/23 2/15/23  Anita Saint, MD   metoprolol tartrate (LOPRESSOR) 25 mg tablet Take 1 tablet (25 mg total) by mouth 2 (two) times a day 1/16/23   Anita Saint, MD   omeprazole (PriLOSEC) 20 mg delayed release capsule Take 1 capsule (20 mg total) by mouth daily 1/16/23   Anita Saint, MD   aspirin 81 mg chewable tablet Chew 1 tablet (81 mg total) daily 7/30/21   Lara Moore DO   atorvastatin (LIPITOR) 40 mg tablet TAKE ONE TABLET BY MOUTH ONE TIME DAILY IN THE EVENING   Patient not taking: No sig reported 9/7/21   Lara Moore DO   cyanocobalamin (CYANOCOBALAMIN) 100 MCG TABS Take 1 5 tablets (150 mcg total) by mouth daily 6/3/22 9/23/22  Tanna Mcduffie MD   docusate sodium (COLACE) 100 mg capsule Take 1 capsule (100 mg total) by mouth 2 (two) times a day 9/23/22 12/22/22  Evelina Jenkins MD   latanoprost (XALATAN) 0 005 % ophthalmic solution  2/8/21   Historical Provider, MD   meloxicam (MOBIC) 7 5 mg tablet Take 1 tablet (7 5 mg total) by mouth daily  Patient not taking: No sig reported 8/3/21   Zane Yanez PA-C   ondansetron (Zofran ODT) 4 mg disintegrating tablet Take 1 tablet (4 mg total) by mouth every 6 (six) hours as needed for nausea or vomiting 9/23/22   Evelina Jenkins MD   polyethylene glycol (MIRALAX) 17 g packet Take 17 g by mouth as needed (constipation) for up to 10 doses 9/23/22   Evelina Jenkins MD     MEDICATIONS ADMINISTERED IN LAST 24 HOURS     Medication Administration - last 24 hours from 01/20/2023 0138 to 01/21/2023 0138       Date/Time Order Dose Route Action Action by     01/20/2023 1734 EST lidocaine (LIDODERM) 5 % patch 1 patch 1 patch Topical Medication Applied Henrietta Castellano RN     01/20/2023 2301 EST latanoprost (XALATAN) 0 005 % ophthalmic solution 1 drop 1 drop Both Eyes Given NYLA Brown     01/20/2023 2301 EST metoprolol tartrate (LOPRESSOR) tablet 25 mg 25 mg Oral Given Lianet Marisel Weiner RN     01/20/2023 2301 EST acetaminophen (TYLENOL) tablet 975 mg 975 mg Oral Given María Ewing RN        CURRENT MEDICATIONS     Current Facility-Administered Medications   Medication Dose Route Frequency Provider Last Rate   • acetaminophen  975 mg Oral [facility] Minnix, DO     • amLODIPine  5 mg Oral Daily [facility] Minnix, DO     • enoxaparin  40 mg Subcutaneous Daily [facility] Minnix, DO     • latanoprost  1 drop Both Eyes HS [facility] Minnix, DO     • lidocaine  1 patch Topical Once Patsy Ayon MD     • lidocaine  1 patch Topical Daily [facility] Minnix, DO     • LORazepam  0 5 mg Oral Daily PRN Jaylen Proper Minnix, DO     • metoprolol tartrate  25 mg Oral BID [facility] Minnix, DO     • pantoprazole  40 mg Oral Early Morning [facility] Minnix, DO          LORazepam, 0 5 mg, Daily PRN        Admission Time  I spent 30 minutes admitting the patient  This involved direct patient contact where I performed a full history and physical, reviewing previous records, and reviewing laboratory and other diagnostic studies  Portions of the record may have been created with voice recognition software  Occasional wrong word or "sound a like" substitutions may have occurred due to the inherent limitations of voice recognition software    Read the chart carefully and recognize, using context, where substitutions have occurred     ==  Kaela Strong DO  [address]  Internal Medicine Residency PGY-2

## 2023-01-21 NOTE — PROGRESS NOTES
[facility]  Progress Note - William Casas 1935, 81 y o  female MRN: [id number]  Unit/Bed#: ED 20 Encounter: [id number]  Primary Care Provider: Cayla Pool MD   Date and time admitted to hospital: 1/20/2023  5:11 PM    * Ambulatory dysfunction  Assessment & Plan  Presents with ambulatory dysfunction secondary to severe right hip osteoarthritis  Baseline ambulation with walker  However, patient states that ambulation is very difficult now due to pain  No falls  PCP recommended patient come to the hospital for PT/OT evaluation  Right hip x-ray shows severe bone-on-bone hip osteoarthritis  On exam, hip flexion limited secondary to pain, positive right log roll  · Pain regimen: Tylenol 975 mg 3 times daily, lidocaine patch daily, aqua K  · PT/OT evaluations  · Fall precautions    Essential hypertension  Assessment & Plan  /99 on presentation  Noted increased to 221/102 however this was when she was transported from the ED waiting room to an ED bed  Suspect that this was related to pain  Blood pressure 133/62, pulse 67, temperature 97 8 °F (36 6 °C), temperature source Oral, resp  rate 20, height 5' (1 524 m), weight 45 8 kg (101 lb), SpO2 96 %  Current pressures reviewed and are acceptable    · Continue home Lopressor 25 mg twice daily  · Increase amlodipine from 5 to 10 mg daily  · Continue to monitor blood pressure    Anxiety  Assessment & Plan  Patient states she takes Ativan 0 5 mg once a day as needed  PDMP reviewed  · As needed Ativan 0 5 mg daily    GERD (gastroesophageal reflux disease)  Assessment & Plan  Home regimen: Omeprazole 20 mg     · Protonix 40 mg while inpatient as omeprazole is not on hospital formulary      VTE Pharmacologic Prophylaxis:     Moderate Risk (Score 3-4) - Pharmacological DVT Prophylaxis Ordered: Enoxaparin (Lovenox)      Mechanical VTE Prophylaxis in Place: Yes    Patient Centered Rounds: I have performed bedside rounds with nursing staff today  Discussions with Specialists or Other Care Team Provider: YES    Education and Discussions with Family / Patient: YES    Current Length of Stay: 1 day(s)    Current Patient Status: Inpatient     Discharge Plan / Estimated Discharge Date: Anticipate discharge in 48 hrs to TO BE DETERMINED AS PER pt EVALUATION    Code Status: Level 1 - Full Code      Subjective:   Patient seen and examined at bedside  Hemodynamically stable  Awake alert and oriented  Complaining of right hip pain  No overnight events reported    Objective:     Vitals:   Temp (24hrs), Av 8 °F (36 6 °C), Min:97 8 °F (36 6 °C), Max:97 8 °F (36 6 °C)    Temp:  [97 8 °F (36 6 °C)] 97 8 °F (36 6 °C)  HR:  [] 74  Resp:  [16-22] 16  BP: (133-221)/() 159/71  SpO2:  [93 %-96 %] 96 %  Body mass index is 19 73 kg/m²  Input and Output Summary (last 24 hours):     No intake or output data in the 24 hours ending 23 1139    Physical Exam:     Physical Exam  Vitals reviewed  Constitutional:       Appearance: Normal appearance  HENT:      Head: Atraumatic  Eyes:      General: No scleral icterus  Cardiovascular:      Rate and Rhythm: Normal rate and regular rhythm  Heart sounds: Normal heart sounds  Pulmonary:      Effort: No respiratory distress  Breath sounds: Normal breath sounds  Musculoskeletal:      Cervical back: Neck supple  Skin:     General: Skin is warm and dry  Capillary Refill: Capillary refill takes less than 2 seconds  Neurological:      Mental Status: She is alert and oriented to person, place, and time  Motor: Weakness present     Psychiatric:         Mood and Affect: Mood normal           Additional Data:     Labs:  Results from last 7 days   Lab Units 23  0606 23  1734   WBC Thousand/uL 7 05 9 42   HEMOGLOBIN g/dL 13 3 13 9   HEMATOCRIT % 39 3 41 2   PLATELETS Thousands/uL 241 250   NEUTROS PCT %  --  71   LYMPHS PCT %  --  20   MONOS PCT %  --  6   EOS PCT %  --  2     Results from last 7 days   Lab Units 01/21/23  0606 01/20/23  1734   SODIUM mmol/L 141 138   POTASSIUM mmol/L 3 8 4 0   CHLORIDE mmol/L 109* 106   CO2 mmol/L 27 25   BUN mg/dL 15 14   CREATININE mg/dL 0 78 0 81   ANION GAP mmol/L 5 7   CALCIUM mg/dL 9 3 9 9   ALBUMIN g/dL  --  4 2   TOTAL BILIRUBIN mg/dL  --  0 53   ALK PHOS U/L  --  85   ALT U/L  --  22   AST U/L  --  15   GLUCOSE RANDOM mg/dL 89 80                       Imaging: Reviewed radiology reports from this admission including: xray(s)    Recent Cultures (last 7 days):           Lines/Drains:  Invasive Devices     Peripheral Intravenous Line  Duration           Peripheral IV 01/20/23 Left Antecubital <1 day                Telemetry:        Last 24 Hours Medication List:   Current Facility-Administered Medications   Medication Dose Route Frequency Provider Last Rate   • acetaminophen  975 mg Oral [facility] Stefany BUTLER Minnix, DO     • amLODIPine  10 mg Oral Daily Natalie Best MD     • enoxaparin  40 mg Subcutaneous Daily Stefany BUTLER Minnix, DO     • latanoprost  1 drop Both Eyes HS Stefany BUTLER Minnix, DO     • lidocaine  1 patch Topical Daily Stefany LUKE Minnix, DO     • LORazepam  0 5 mg Oral Daily PRN Stefany BUTLER Minnix, DO     • metoprolol tartrate  25 mg Oral BID Stefany LUKE Minnix, DO     • pantoprazole  40 mg Oral Early Morning Stefnay BUTLER Minnix, DO          Today, Patient Was Seen By: Natalie Best MD    ** Please Note: This note has been constructed using a voice recognition system   **

## 2023-01-22 VITALS
RESPIRATION RATE: 20 BRPM | TEMPERATURE: 98.3 F | HEART RATE: 79 BPM | WEIGHT: 101 LBS | BODY MASS INDEX: 19.83 KG/M2 | HEIGHT: 60 IN | SYSTOLIC BLOOD PRESSURE: 154 MMHG | DIASTOLIC BLOOD PRESSURE: 79 MMHG | OXYGEN SATURATION: 96 %

## 2023-01-22 LAB
ANION GAP SERPL CALCULATED.3IONS-SCNC: 5 MMOL/L (ref 4–13)
BASOPHILS # BLD AUTO: 0.07 THOUSANDS/ÂΜL (ref 0–0.1)
BASOPHILS NFR BLD AUTO: 1 % (ref 0–1)
BUN SERPL-MCNC: 16 MG/DL (ref 5–25)
CALCIUM SERPL-MCNC: 9.2 MG/DL (ref 8.3–10.1)
CHLORIDE SERPL-SCNC: 109 MMOL/L (ref 96–108)
CO2 SERPL-SCNC: 27 MMOL/L (ref 21–32)
CREAT SERPL-MCNC: 0.91 MG/DL (ref 0.6–1.3)
EOSINOPHIL # BLD AUTO: 0.23 THOUSAND/ÂΜL (ref 0–0.61)
EOSINOPHIL NFR BLD AUTO: 3 % (ref 0–6)
ERYTHROCYTE [DISTWIDTH] IN BLOOD BY AUTOMATED COUNT: 12.4 % (ref 11.6–15.1)
GFR SERPL CREATININE-BSD FRML MDRD: 56 ML/MIN/1.73SQ M
GLUCOSE SERPL-MCNC: 89 MG/DL (ref 65–140)
HCT VFR BLD AUTO: 34.5 % (ref 34.8–46.1)
HGB BLD-MCNC: 11.9 G/DL (ref 11.5–15.4)
IMM GRANULOCYTES # BLD AUTO: 0.02 THOUSAND/UL (ref 0–0.2)
IMM GRANULOCYTES NFR BLD AUTO: 0 % (ref 0–2)
LYMPHOCYTES # BLD AUTO: 1.74 THOUSANDS/ÂΜL (ref 0.6–4.47)
LYMPHOCYTES NFR BLD AUTO: 22 % (ref 14–44)
MCH RBC QN AUTO: 32.9 PG (ref 26.8–34.3)
MCHC RBC AUTO-ENTMCNC: 34.5 G/DL (ref 31.4–37.4)
MCV RBC AUTO: 95 FL (ref 82–98)
MONOCYTES # BLD AUTO: 0.56 THOUSAND/ÂΜL (ref 0.17–1.22)
MONOCYTES NFR BLD AUTO: 7 % (ref 4–12)
NEUTROPHILS # BLD AUTO: 5.2 THOUSANDS/ÂΜL (ref 1.85–7.62)
NEUTS SEG NFR BLD AUTO: 67 % (ref 43–75)
NRBC BLD AUTO-RTO: 0 /100 WBCS
PLATELET # BLD AUTO: 203 THOUSANDS/UL (ref 149–390)
PMV BLD AUTO: 10.1 FL (ref 8.9–12.7)
POTASSIUM SERPL-SCNC: 3.9 MMOL/L (ref 3.5–5.3)
RBC # BLD AUTO: 3.62 MILLION/UL (ref 3.81–5.12)
SODIUM SERPL-SCNC: 141 MMOL/L (ref 135–147)
WBC # BLD AUTO: 7.82 THOUSAND/UL (ref 4.31–10.16)

## 2023-01-22 RX ORDER — LIDOCAINE 50 MG/G
1 PATCH TOPICAL DAILY
Qty: 30 PATCH | Refills: 0 | Status: SHIPPED | OUTPATIENT
Start: 2023-01-23

## 2023-01-22 RX ORDER — ACETAMINOPHEN 325 MG/1
650 TABLET ORAL EVERY 6 HOURS PRN
Qty: 30 TABLET | Refills: 0 | Status: SHIPPED | OUTPATIENT
Start: 2023-01-22

## 2023-01-22 RX ADMIN — LIDOCAINE 5% 1 PATCH: 700 PATCH TOPICAL at 09:09

## 2023-01-22 RX ADMIN — DICLOFENAC SODIUM 2 G: 10 GEL TOPICAL at 09:12

## 2023-01-22 RX ADMIN — ENOXAPARIN SODIUM 40 MG: 40 INJECTION SUBCUTANEOUS at 09:04

## 2023-01-22 RX ADMIN — AMLODIPINE BESYLATE 10 MG: 10 TABLET ORAL at 09:04

## 2023-01-22 RX ADMIN — ACETAMINOPHEN 975 MG: 325 TABLET, FILM COATED ORAL at 05:55

## 2023-01-22 RX ADMIN — PANTOPRAZOLE SODIUM 40 MG: 40 TABLET, DELAYED RELEASE ORAL at 05:55

## 2023-01-22 RX ADMIN — METOPROLOL TARTRATE 25 MG: 25 TABLET, FILM COATED ORAL at 09:04

## 2023-01-22 NOTE — PLAN OF CARE
Problem: Potential for Falls  Goal: Patient will remain free of falls  Description: INTERVENTIONS:  - Educate patient/family on patient safety including physical limitations  - Instruct patient to call for assistance with activity   - Consult OT/PT to assist with strengthening/mobility   - Keep Call bell within reach  - Keep bed low and locked with side rails adjusted as appropriate  - Keep care items and personal belongings within reach  - Initiate and maintain comfort rounds  - Make Fall Risk Sign visible to staff  - Offer Toileting every 2 ours, in advance of need  - Initiate/Maintain  bed alarm  - Obtain necessary fall risk management equipment: socks  - Apply yellow socks and bracelet for high fall risk patients  - Consider moving patient to room near nurses station  Outcome: Progressing

## 2023-01-22 NOTE — ASSESSMENT & PLAN NOTE
/99 on presentation  Noted increased to 221/102 however this was when she was transported from the ED waiting room to an ED bed  Suspect that this was related to pain  Blood pressure 154/79, pulse 79, temperature 98 3 °F (36 8 °C), resp  rate 20, height 5' (1 524 m), weight 45 8 kg (101 lb), SpO2 96 %      Current pressures reviewed and are acceptable    · Continue home Lopressor 25 mg twice daily  · Continue amlodipine 5 mg daily  · Continue to monitor blood pressure

## 2023-01-22 NOTE — ASSESSMENT & PLAN NOTE
Presents with ambulatory dysfunction secondary to severe right hip osteoarthritis  Baseline ambulation with walker  However, patient states that ambulation is very difficult now due to pain  No falls  PCP recommended patient come to the hospital for PT/OT evaluation  Right hip x-ray shows severe bone-on-bone hip osteoarthritis  On exam, hip flexion limited secondary to pain, positive right log roll        · Pain regimen: Tylenol 975 mg 3 times daily, lidocaine patch daily, aqua K  · PT/OT evaluations recommends home with home health care

## 2023-01-22 NOTE — PLAN OF CARE
Problem: PHYSICAL THERAPY ADULT  Goal: Performs mobility at highest level of function for planned discharge setting  See evaluation for individualized goals  Description: Treatment/Interventions: ADL retraining, Functional transfer training, LE strengthening/ROM, Therapeutic exercise, Patient/family training, Equipment eval/education, Bed mobility, Gait training, Compensatory technique education, Spoke to nursing, Spoke to MD  Equipment Recommended: Jaquita Riedel (pt owns)       See flowsheet documentation for full assessment, interventions and recommendations  Outcome: Completed  Note: Prognosis: Good  Problem List: Decreased strength, Impaired balance, Decreased mobility, Impaired judgement, Pain  Assessment: Pt seen for high complexity PT evaluation  Pt with active PT eval/treat orders  Pt is a 81 y o  female who was admitted to [facility] on 1/20/2023  Pt's current dx/ problem list include: ambulatory dysfunction  Comorbidities affecting pt's physical performance at time of assessment are as follows: has a past medical history of Allergic rhinitis, Anxiety, Diverticulitis of large intestine without perforation or abscess without bleeding, Dysthymic disorder, Gastro-esophageal reflux disease without esophagitis, HLD (hyperlipidemia), Hypertension, Osteopenia, Palpitations, Paresthesia, and Stroke ([facility] [address] )  Personal factors affecting pt at time of initial examination include: steps to enter environment, past experience, inability to perform IADLs, inability to perform ADLs, inability to ambulate household distances  Due to acute medical issues, ongoing medical workup for primary dx; pain, fall risk, decreased activity tolerance compared to baseline, increased assistance needed from caregiver at current time, multiple lines, decline in overall functional mobility status; health management issues; note unstable clinical picture (high complexity)   At baseline, pt resides with family in a 2  with and was independent with ADLs  Currently, upon initial examination, pt  is requiring supervision for sit to stand and ambulation  Pt refuses further PT intervention at this time 2* to right hip pain that she has been managing conservative  Pt appears to be functioning at or near baseline  Pt would benefit form HHPT however does not wish that at this time  PT to sign off at this time  Pt currently at increased risk for falls  At the end of evaluation, pt was left seated edge of bed with breakfast tray, all needs in reach  The patient's AM-PAC Basic Mobility Inpatient Short Form Raw Score is 21  A Raw score of greater than 16 suggests the patient may benefit from discharge to home  Please also refer to the recommendation of the Physical Therapist for safe discharge planning  Barriers to Discharge: None     PT Discharge Recommendation: (S) Home with home health rehabilitation (However pt refuses despite education)    See flowsheet documentation for full assessment

## 2023-01-22 NOTE — DISCHARGE SUMMARY
INTERNAL MEDICINE RESIDENCY DISCHARGE SUMMARY     Kuldip Jacobson   81 y o  female  MRN: [id number]  Room/Bed: /MS 80-65     [facility] 7   Encounter: [id number]    Principal Problem:    Ambulatory dysfunction  Active Problems:    Essential hypertension    GERD (gastroesophageal reflux disease)    Anxiety      * Ambulatory dysfunction  Assessment & Plan  Presents with ambulatory dysfunction secondary to severe right hip osteoarthritis  Baseline ambulation with walker  However, patient states that ambulation is very difficult now due to pain  No falls  PCP recommended patient come to the hospital for PT/OT evaluation  Right hip x-ray shows severe bone-on-bone hip osteoarthritis  On exam, hip flexion limited secondary to pain, positive right log roll  · Pain regimen: Tylenol 975 mg 3 times daily, lidocaine patch daily, aqua K  · PT/OT evaluations recommends home with home health care    Anxiety  Assessment & Plan  Patient states she takes Ativan 0 5 mg once a day as needed  PDMP reviewed  · As needed Ativan 0 5 mg daily    GERD (gastroesophageal reflux disease)  Assessment & Plan  Home regimen: Omeprazole 20 mg     · Protonix 40 mg while inpatient as omeprazole is not on hospital formulary    Essential hypertension  Assessment & Plan  /99 on presentation  Noted increased to 221/102 however this was when she was transported from the ED waiting room to an ED bed  Suspect that this was related to pain  Blood pressure 154/79, pulse 79, temperature 98 3 °F (36 8 °C), resp  rate 20, height 5' (1 524 m), weight 45 8 kg (101 lb), SpO2 96 %      Current pressures reviewed and are acceptable    · Continue home Lopressor 25 mg twice daily  · Continue amlodipine 5 mg daily  · Continue to monitor blood pressure      [facility] is a 41-year-old female with past medical history of hypertension, anxiety, right hip osteoarthritis, osteoporosis, hyperlipidemia who presented to the hospital with amatory dysfunction secondary to chronic right hip pain due to severe OA  Patient was admitted to the hospital for tractable pain and PT/OT evaluation  Right hip x-ray shows no acute osseous abnormality and shows severe right hip osteoarthritis  Patient's pain improved at time of discharge  Patient was evaluated by PT who recommended home with home health rehabilitation  CM helped arrange [address]  and patient was medically stable for discharge home  Plan of care discussed with 2 daughters at bedside  Subjective:  Patient assessed at bedside  Reports she is eager to go home  Reports pain in her right hip is improved  Able to ambulate with a walker  Worked with physical therapy today and walked 20 feet with supervision  Visit Vitals  /79   Pulse 79   Temp 98 3 °F (36 8 °C)   Resp 20   Ht 5' (1 524 m)   Wt 45 8 kg (101 lb)   SpO2 96%   BMI 19 73 kg/m²   OB Status Hysterectomy   Smoking Status Former   BSA 1 4 m²       Physical Exam  Vitals reviewed  Constitutional:       General: She is not in acute distress  Appearance: She is well-developed  She is not diaphoretic  HENT:      Head: Normocephalic and atraumatic  Eyes:      General: No scleral icterus  Conjunctiva/sclera: Conjunctivae normal    Neck:      Vascular: No JVD  Cardiovascular:      Rate and Rhythm: Normal rate and regular rhythm  Heart sounds: No friction rub  No gallop  Pulmonary:      Effort: Pulmonary effort is normal  No respiratory distress  Breath sounds: Normal breath sounds  No wheezing or rales  Abdominal:      General: Bowel sounds are normal       Palpations: Abdomen is soft  Tenderness: There is no abdominal tenderness  Musculoskeletal:         General: Normal range of motion  Right lower leg: No edema  Left lower leg: No edema  Skin:     General: Skin is warm and dry     Neurological: General: No focal deficit present  Mental Status: She is alert  Mental status is at baseline  Psychiatric:         Mood and Affect: Mood normal          Behavior: Behavior normal            DISCHARGE INFORMATION     PCP at Discharge: Piyush Corona    Admitting Provider: Yasmin Bernabe MD  Admission Date: 1/20/2023    Discharge Provider: Usha Don MD  Discharge Date: 1/22/2023    Discharge Disposition: Home/Self Care  Discharge Condition: stable  Discharge with Lines: no    Discharge Diet: regular diet  Activity Restrictions: none  Test Results Pending at Discharge: None    Discharge Diagnoses:  Principal Problem:    Ambulatory dysfunction  Active Problems:    Essential hypertension    GERD (gastroesophageal reflux disease)    Anxiety  Resolved Problems:    * No resolved hospital problems  *      Consulting Providers:      Diagnostic & Therapeutic Procedures Performed:  XR hip/pelv 2-3 vws right    Result Date: 1/21/2023  Impression: No acute osseous abnormality  Severe right hip osteoarthritis   Workstation performed: [id number]       Code Status: Level 1 - Full Code  Advance Directive & Living Will: <no information>  Power of :    POLST:      Medications:  Current Discharge Medication List      STOP taking these medications       aspirin 81 mg chewable tablet Comments:   Reason for Stopping:         atorvastatin (LIPITOR) 40 mg tablet Comments:   Reason for Stopping:         meloxicam (MOBIC) 7 5 mg tablet Comments:   Reason for Stopping:             Current Discharge Medication List      START taking these medications    Details   acetaminophen (TYLENOL) 325 mg tablet Take 2 tablets (650 mg total) by mouth every 6 (six) hours as needed for mild pain  Qty: 30 tablet, Refills: 0    Associated Diagnoses: Osteoarthritis of right hip      Diclofenac Sodium (VOLTAREN) 1 % Apply 2 g topically 4 (four) times a day  Qty: 350 g, Refills: 0    Associated Diagnoses: Osteoarthritis of right hip      lidocaine (LIDODERM) 5 % Apply 1 patch topically over 12 hours daily Remove & Discard patch within 12 hours or as directed by MD Do not start before January 23, 2023    Qty: 30 patch, Refills: 0    Associated Diagnoses: Osteoarthritis of right hip           Current Discharge Medication List      CONTINUE these medications which have NOT CHANGED    Details   amLODIPine (NORVASC) 5 mg tablet Take 1 tablet (5 mg total) by mouth daily  Qty: 90 tablet, Refills: 2    Associated Diagnoses: Essential hypertension      latanoprost (XALATAN) 0 005 % ophthalmic solution       LORazepam (ATIVAN) 0 5 mg tablet Take 1 tablet (0 5 mg total) by mouth 2 (two) times a day  Qty: 60 tablet, Refills: 0    Associated Diagnoses: Anxiety attack      metoprolol tartrate (LOPRESSOR) 25 mg tablet Take 1 tablet (25 mg total) by mouth 2 (two) times a day  Qty: 180 tablet, Refills: 2    Associated Diagnoses: Essential hypertension      omeprazole (PriLOSEC) 20 mg delayed release capsule Take 1 capsule (20 mg total) by mouth daily  Qty: 90 capsule, Refills: 2    Associated Diagnoses: Gastroesophageal reflux disease without esophagitis      cyanocobalamin (CYANOCOBALAMIN) 100 MCG TABS Take 1 5 tablets (150 mcg total) by mouth daily  Qty: 45 tablet, Refills: 0    Associated Diagnoses: Vitamin B12 deficiency      docusate sodium (COLACE) 100 mg capsule Take 1 capsule (100 mg total) by mouth 2 (two) times a day  Qty: 180 capsule, Refills: 0    Associated Diagnoses: Constipation, unspecified constipation type      ondansetron (Zofran ODT) 4 mg disintegrating tablet Take 1 tablet (4 mg total) by mouth every 6 (six) hours as needed for nausea or vomiting  Qty: 20 tablet, Refills: 0    Associated Diagnoses: Nausea      polyethylene glycol (MIRALAX) 17 g packet Take 17 g by mouth as needed (constipation) for up to 10 doses  Qty: 17 g, Refills: 0    Associated Diagnoses: Constipation, unspecified constipation type             Allergies:  No Known Allergies    FOLLOW-UP     PCP Outpatient Follow-up:  Follow-up with PCP in 1-2 weeks    Consulting Providers Follow-up:  None     Active Issues Requiring Follow-up:   None    Discharge Statement:   I spent 45 minutes minutes discharging the patient  This time was spent on the day of discharge  I had direct contact with the patient on the day of discharge  Additional documentation is required if more than 30 minutes were spent on discharge  Portions of the record may have been created with voice recognition software  Occasional wrong word or "sound a like" substitutions may have occurred due to the inherent limitations of voice recognition software    Read the chart carefully and recognize, using context, where substitutions have occurred     ==  Diallo Doll, [facility]  Internal Medicine Resident PGY-3

## 2023-01-22 NOTE — DISCHARGE INSTR - OTHER ORDERS
Sights for [facility] AT [facility] [phone] (May be able to provide [address])

## 2023-01-22 NOTE — CASE MANAGEMENT
Case Management Assessment & Discharge Planning Note    Patient name Jacy Hunter  Location [address] 762/-03 MRN [id number]  : 1935 Date 2023       Current Admission Date: 2023  Current Admission Diagnosis:Ambulatory dysfunction   Patient Active Problem List    Diagnosis Date Noted   • Ambulatory dysfunction 2023   • Anxiety 06/10/2022   • Vitamin B12 deficiency 2022   • Mild protein-calorie malnutrition ([facility] ) 2021   • Diverticulitis large intestine w/o perforation or abscess w/o bleeding 11/10/2021   • Small vessel stroke ([facility] ) 11/10/2021   • Mild cognitive impairment 2021   • Word finding difficulty 2021   • Perianal lesion 2021   • Chronic idiopathic constipation 10/28/2020   • Dizziness 2019   • Chest tightness 2019   • Essential hypertension 2019   • GERD (gastroesophageal reflux disease) 2019      LOS (days): 2  Geometric Mean LOS (GMLOS) (days):   Days to GMLOS:     OBJECTIVE:    Risk of Unplanned Readmission Score: 7 52         Current admission status: Inpatient       Preferred Pharmacy:   [facility] [address] [place] [address]  Phone: [phone] Fax: [address] [facility], [address] [place] [address] [address] [address]  Phone: [phone] Fax: [phone]    Primary Care Provider: Usha Don MD    Primary Insurance: MEDICARE  Secondary Insurance: [employer]    ASSESSMENT:  [facility], [facility] Representative - Daughter   Primary Phone: [phone] (Home)  Mobile Phone: [phone]                         Readmission Root Cause  30 Day Readmission: No    Patient Information  Admitted from[de-identified] Home  Mental Status: Alert, Confused  During Assessment patient was accompanied by: Not accompanied during assessment (Called daughter Katya Segundo)  Assessment information provided by[de-identified] Daughter  Primary Caregiver: Family  Caregiver's Name[de-identified] Forrest Andrade Relationship to Patient[de-identified] Family Member  Caregiver's Telephone Number[de-identified] [phone]  Support Systems: Daughter  [place] of Residence: [address] [place] [address] do you live in?: [address] entry access options   Select all that apply : Stairs  Type of Current Residence: 2 story home  Upon entering residence, is there a bedroom on the main floor (no further steps)?: No  A bedroom is located on the following floor levels of residence (select all that apply):: 2nd Floor  Upon entering residence, is there a bathroom on the main floor (no further steps)?: Yes  Number of steps to basement from main floor: One Flight  In the last 12 months, was there a time when you were not able to pay the mortgage or rent on time?: No  In the last 12 months, how many places have you lived?: 1  In the last 12 months, was there a time when you did not have a steady place to sleep or slept in a shelter (including now)?: No  Homeless/housing insecurity resource given?: N/A  Living Arrangements: Lives w/ Daughter    Activities of Daily Living Prior to Admission  Functional Status: Assistance  Completes ADLs independently?: No  Level of ADL dependence: Assistance  Ambulates independently?: Yes         Patient Information Continued  Within the past 12 months, you worried that your food would run out before you got the money to buy more : Never true  Within the past 12 months, the food you bought just didn't last and you didn't have money to get more : Never true  Food insecurity resource given?: N/A  Does patient receive dialysis treatments?: No  Does patient have a history of substance abuse?: No         Means of Transportation  Means of Transport to Appts[de-identified] Family transport  In the past 12 months, has lack of transportation kept you from medical appointments or from getting medications?: No  In the past 12 months, has lack of transportation kept you from meetings, work, or from getting things needed for daily living?: No  Was application for public transport provided?: N/A        DISCHARGE DETAILS:    Discharge planning discussed with[de-identified] Stewart daughter  Freedom of Choice: Yes     CM contacted family/caregiver?: Yes  Were Treatment Team discharge recommendations reviewed with patient/caregiver?: Yes  Did patient/caregiver verbalize understanding of patient care needs?: Yes  Were patient/caregiver advised of the risks associated with not following Treatment Team discharge recommendations?: Yes    Contacts  Patient Contacts: Stewart  Relationship to Patient[de-identified] Family  Contact Method: Phone  Reason/Outcome: Continuity of Care, Emergency Contact, Discharge [address]         Is the patient interested in [facility] at discharge?: Yes  [address] requested[de-identified] [address], Nursing, Physical [address] [facility] Provider[de-identified] PCP  Home Health Services Needed[de-identified] Gait/ADL Training, Evaluate Functional Status and Safety, Strengthening/Theraputic Exercises to Improve Function  Homebound Criteria Met[de-identified] Requires the Assistance of Another Person for Safe Ambulation or to Leave the Home  Supporting Clincal Findings[de-identified] Limited Endurance, Fatigues Easliy in Short Distances         Other Referral/Resources/Interventions Provided:  Interventions: [facility] AT [facility]            Discharge Destination Plan[de-identified] Home, Home with Luis at Discharge : Family           ETA of Transport (Date): 01/22/23            [id number] UPDATE: waiting for [facility] and [facility] to confirm if they have a HHA  Will call daughter on Monday to update  Sent email to [facility] to assist with getting the patient waiver services       Gave patients family information on sights for [facility] and [place] due to the patients macular degeneration

## 2023-01-22 NOTE — PHYSICAL THERAPY NOTE
Physical Therapy Evaluation     Patient's Name: Tameka Gunn    Admitting Diagnosis  Hip pain [M25 559]  Osteoarthritis of right hip [M16 11]  Ambulatory dysfunction [R26 2]    Problem List  Patient Active Problem List   Diagnosis    Dizziness    Chest tightness    Essential hypertension    GERD (gastroesophageal reflux disease)    Chronic idiopathic constipation    Perianal lesion    Word finding difficulty    Mild cognitive impairment    Diverticulitis large intestine w/o perforation or abscess w/o bleeding    Small vessel stroke (HCC)    Mild protein-calorie malnutrition (HCC)    Vitamin B12 deficiency    Anxiety    Ambulatory dysfunction       Past Medical History  Past Medical History:   Diagnosis Date    Allergic rhinitis     Anxiety     Diverticulitis of large intestine without perforation or abscess without bleeding     Dysthymic disorder     Gastro-esophageal reflux disease without esophagitis     HLD (hyperlipidemia)     Hypertension     Osteopenia     Palpitations     Paresthesia     Stroke [facility])        Past Surgical History  Past Surgical History:   Procedure Laterality Date    APPENDECTOMY      CHOLECYSTECTOMY      COLON SURGERY      FL INJECTION RIGHT HIP (NON ARTHROGRAM)  8/28/2019    HYSTERECTOMY            01/22/23 1015   PT Last Visit   PT Visit Date 01/22/23   Note Type   Note type Evaluation   Pain Assessment   Pain Assessment Tool 0-10   Pain Score 4   Pain Location/Orientation Orientation: Right;Location: Hip   Hospital Pain Intervention(s) Repositioned; Ambulation/increased activity; Emotional support   Restrictions/Precautions   Weight Bearing Precautions Per Order No   Other Precautions Fall Risk;Pain;Hard of hearing   Home Living   Type of [address] [place] [address] Two level  (Stair glide to second floor)   Bathroom Shower/Tub Walk-in shower   Bathroom Toilet Standard   Bathroom Equipment Shower chair   Bathroom Accessibility Accessible via walker   [address] [place] [address],[address]   Additional Comments PTA pt repots that she was ambulating with RW short distance in the house   Prior Function   Level of [place] Independent with functional mobility; Needs assistance with IADLS   Lives With Family;Daughter   Receives Help From [facility] in the last 6 months 0   Vocational Retired   Comments PTA pt reports that she lives w/ her dgt, son in law and 2 adult grandsons  She reports that her daughter is constantly on her to move more  She declines PT as the pain in her right hip is painful with activity  Questionable historian at times  Daughter is home all the time with her   General   Family/Caregiver Present No   Cognition   Arousal/Participation Cooperative   Orientation Level Oriented to person;Oriented to place;Oriented to time;Disoriented to situation   Following Commands Follows one step commands without difficulty   RLE Assessment   RLE Assessment WNL  (Grossly 4-/5 acessed w/ mobility)   LLE Assessment   LLE Assessment WNL  (Grossly 4-/5 acessed w/ mobility)   Bed Mobility   Additional Comments pt greated sitting EOB with breakfast tray   Transfers   Sit to Stand 5  Supervision   Additional items Increased time required   Stand to Sit 5  Supervision   Additional items Increased time required   Additional Comments performed with RW   Ambulation/Elevation   Gait pattern Decreased foot clearance;Decreased R stance   Gait Assistance 5  Supervision   Assistive Device Rolling walker   Distance 20'   Balance   Static Sitting Good   Dynamic Sitting Fair +   Static Standing Fair -   Dynamic Standing Fair -   Ambulatory Poor +   Activity Tolerance   Activity Tolerance Patient limited by pain   Medical Staff Made Aware RN jamal pt for PT evaluation   Assessment   Prognosis Good   Problem List Decreased strength; Impaired balance;Decreased mobility; Impaired judgement;Pain   Assessment Pt seen for high complexity PT evaluation  Pt with active PT eval/treat orders   Pt is a 81 y o  female who was admitted to [facility] on 1/20/2023  Pt's current dx/ problem list include: ambulatory dysfunction  Comorbidities affecting pt's physical performance at time of assessment are as follows: has a past medical history of Allergic rhinitis, Anxiety, Diverticulitis of large intestine without perforation or abscess without bleeding, Dysthymic disorder, Gastro-esophageal reflux disease without esophagitis, HLD (hyperlipidemia), Hypertension, Osteopenia, Palpitations, Paresthesia, and Stroke ([address] )  Personal factors affecting pt at time of initial examination include: steps to enter environment, past experience, inability to perform IADLs, inability to perform ADLs, inability to ambulate household distances  Due to acute medical issues, ongoing medical workup for primary dx; pain, fall risk, decreased activity tolerance compared to baseline, increased assistance needed from caregiver at current time, multiple lines, decline in overall functional mobility status; health management issues; note unstable clinical picture (high complexity)  At baseline, pt resides with family in a 2 SH with and was independent with ADLs  Currently, upon initial examination, pt  is requiring supervision for sit to stand and ambulation  Pt refuses further PT intervention at this time 2* to right hip pain that she has been managing conservative  Pt appears to be functioning at or near baseline  Pt would benefit form HHPT however does not wish that at this time  PT to sign off at this time  Pt currently at increased risk for falls  At the end of evaluation, pt was left seated edge of bed with breakfast tray, all needs in reach  The patient's AM-PAC Basic Mobility Inpatient Short Form Raw Score is 21  A Raw score of greater than 16 suggests the patient may benefit from discharge to home  Please also refer to the recommendation of the Physical Therapist for safe discharge planning     Barriers to Discharge None   Goals   Patient Goals To go home   Plan Treatment/Interventions ADL retraining;Functional transfer training;LE strengthening/ROM; Therapeutic exercise;Patient/family training;Equipment eval/education; Bed mobility;Gait training; Compensatory technique education;Spoke to nursing;Spoke to MD   PT Frequency Other (Comment)  (PT eval only)   Recommendation   PT Discharge Recommendation (S)  Home with home health rehabilitation  (However pt refuses despite education)   Equipment Recommended Walker  (pt owns)   AM-PAC Basic Mobility Inpatient   Turning in Flat Bed Without Bedrails 4   Lying on Back to Sitting on Edge of Flat Bed Without Bedrails 4   Moving Bed to Chair 4   Standing Up From Chair Using Arms 4   Walk in Room 3   Climb 3-5 Stairs With Railing 2   Basic Mobility Inpatient Raw Score 21   Basic Mobility Standardized Score 45 55   Highest Level Of Mobility   JH-HLM Goal 6: Walk 10 steps or more   JH-HLM Achieved 6: Walk 10 steps or more         Nirmal Barron, PT

## 2023-01-23 ENCOUNTER — TRANSITIONAL CARE MANAGEMENT (OUTPATIENT)
Dept: INTERNAL MEDICINE CLINIC | Facility: OTHER | Age: 88
End: 2023-01-23

## 2023-01-23 ENCOUNTER — TELEPHONE (OUTPATIENT)
Dept: INTERNAL MEDICINE CLINIC | Facility: OTHER | Age: 88
End: 2023-01-23

## 2023-01-23 NOTE — CASE MANAGEMENT
Case Management Discharge Planning Note    Patient name Kuldip Jacobson  Location [address] 762/-68 MRN [id number]  : 1935 Date 2023       Current Admission Date: 2023  Current Admission Diagnosis:Ambulatory dysfunction   Patient Active Problem List    Diagnosis Date Noted   • Ambulatory dysfunction 2023   • Anxiety 06/10/2022   • Vitamin B12 deficiency 2022   • Mild protein-calorie malnutrition ([address] ) 2021   • Diverticulitis large intestine w/o perforation or abscess w/o bleeding 11/10/2021   • Small vessel stroke ([address] ) 11/10/2021   • Mild cognitive impairment 2021   • Word finding difficulty 2021   • Perianal lesion 2021   • Chronic idiopathic constipation 10/28/2020   • Dizziness 2019   • Chest tightness 2019   • Essential hypertension 2019   • GERD (gastroesophageal reflux disease) 2019      LOS (days): 2  Geometric Mean LOS (GMLOS) (days):   Days to GMLOS:     OBJECTIVE:  Risk of Unplanned Readmission Score: 7 81         Current admission status: Inpatient   Preferred Pharmacy:   [facility] [address] [facility] [address] [place] [address]  Phone: [phone] Fax: [address] [place] [address] [address]  [address] [place] [address]  Phone: [phone] Fax: [phone]    Primary Care Provider: Jeannette Mejia MD    Primary Insurance: MEDICARE  Secondary Insurance: Zainab Jaylen    DISCHARGE DETAILS:       Freedom of Choice: Yes  Comments - Freedom of Choice: Called daughter Deanne Childs explained a search was done of [facility] that can offer HHA  [facility] is the only confirmed agency that can provide an HHA                       [address] [place] [address] Agency Name[de-identified] Other ([facility])

## 2023-01-30 ENCOUNTER — TELEPHONE (OUTPATIENT)
Dept: INTERNAL MEDICINE CLINIC | Facility: OTHER | Age: 88
End: 2023-01-30

## 2023-02-01 NOTE — TELEPHONE ENCOUNTER
Pt had two TCM appt scheduled and both were cancelled. Not able to get in touch with daughter to reschedule.

## 2023-02-21 DIAGNOSIS — F41.0 ANXIETY ATTACK: ICD-10-CM

## 2023-02-21 RX ORDER — LORAZEPAM 0.5 MG/1
0.5 TABLET ORAL 2 TIMES DAILY
Qty: 60 TABLET | Refills: 0 | Status: SHIPPED | OUTPATIENT
Start: 2023-02-21 | End: 2023-03-23

## 2023-03-09 ENCOUNTER — TELEPHONE (OUTPATIENT)
Dept: INTERNAL MEDICINE CLINIC | Facility: CLINIC | Age: 88
End: 2023-03-09

## 2023-03-09 DIAGNOSIS — R52 SEVERE PAIN: Primary | ICD-10-CM

## 2023-03-09 NOTE — TELEPHONE ENCOUNTER
Patients  daughter óscar called and would like a referral put in for her mother to get a hospice evaluation her diagnosis is arthritis  of her right hip you can call her with any questions at 74 [phone]

## 2023-03-09 NOTE — TELEPHONE ENCOUNTER
Patient's Daughter called following up on the referral for a hospice evaluation  Diagnosis is for arthritis of her right hip  Please enter referral or call Carmen Schneider if you have any questions       # [phone]

## 2023-03-10 ENCOUNTER — HOME CARE VISIT (OUTPATIENT)
Dept: HOME HEALTH SERVICES | Facility: HOME HEALTHCARE | Age: 88
End: 2023-03-10

## 2023-03-10 NOTE — CASE COMMUNICATION
For [employer]   [birth year] 80 yo female  Referred by her MD for a dx of Severe arthritis of her right hip  Her other dx are Ambulatory dysfunction, Anxiety, HTN, Mild Cognitive impairment, mild PCM last Alb in  was 4 0  All her other blood work is normal  Walks with a walker, forgetful  Pain is her biggest issue  I talked to her about palliative care for pain management but she says it is so difficult to get her out of the h ouse  No wounds  Pt was not approved for hospice today  No terminal dx

## 2023-03-14 ENCOUNTER — TELEPHONE (OUTPATIENT)
Dept: INTERNAL MEDICINE CLINIC | Facility: CLINIC | Age: 88
End: 2023-03-14

## 2023-03-14 NOTE — TELEPHONE ENCOUNTER
Hi, good afternoon  My name is Frida Williamson  I'm one of the nurses at [facility] in [facility]  I'm calling with regard to your patient, Fuentes Caballero spoke with her daughter, Rashad Nolan about 15 minutes ago and she was expecting a referral to Hospice to be sent to our Hospice  I have not received any referral and wants to see if there was one in the works perhaps  My callback number is [phone]  And again, my name is Frida Williamson  I'm a Hospice nurse with [facility]  Thank you         Done by Edwin Lund 3-13-23

## 2023-03-17 ENCOUNTER — TELEPHONE (OUTPATIENT)
Dept: INTERNAL MEDICINE CLINIC | Facility: CLINIC | Age: 88
End: 2023-03-17

## 2023-03-17 DIAGNOSIS — M25.559 HIP PAIN: Primary | ICD-10-CM

## 2023-03-17 NOTE — TELEPHONE ENCOUNTER
Patient does not meet criteria for Hospice  Needs are referral for Palliative care  DX:   Pain in right hip  Put order in [facility] for palliative care for evaluate and treat

## 2023-03-21 DIAGNOSIS — F41.0 ANXIETY ATTACK: ICD-10-CM

## 2023-03-21 RX ORDER — LORAZEPAM 0.5 MG/1
0.5 TABLET ORAL 2 TIMES DAILY
Qty: 60 TABLET | Refills: 0 | Status: SHIPPED | OUTPATIENT
Start: 2023-03-21 | End: 2023-04-20

## 2023-03-24 ENCOUNTER — TELEPHONE (OUTPATIENT)
Dept: INTERNAL MEDICINE CLINIC | Facility: CLINIC | Age: 88
End: 2023-03-24

## 2023-03-24 DIAGNOSIS — G45.9 TIA (TRANSIENT ISCHEMIC ATTACK): Primary | ICD-10-CM

## 2023-03-24 NOTE — TELEPHONE ENCOUNTER
Referral for Palliative Care needs to have TIA for the diagnosis and needs to say consult and treat and be faxed to [address] [place] [address] Drive

## 2023-05-05 NOTE — PROGRESS NOTES
Daily Note     Today's date: 9/10/2019  Patient name: Meryle Hipps  : 1935  MRN: [id number]  Referring provider: Cheryln Cowden, MD  Dx:   Encounter Diagnosis     ICD-10-CM    1  Primary osteoarthritis of one hip, right M16 11                   Subjective: Reports R hip pain in medial area is much improved since injection  Pt feels lateral R hip pain still is present      Objective: See treatment diary below      Assessment: Tolerated treatment well  Able to abolish lateral hip pain via manual tx  Began process of more active ex with handouts issued  Will monitor pt's pain response and progress as able      Plan: Cont PT per LPT plan     Precautions ORTHOSTATIC HYPOTENSION- doesn't drink enough water throughout the day  Has had over the years  Specialty Daily Treatment Diary     FALL RISK  1x/week- progress HEP as able and program   Manual 9/4 9/10      IT band stretch, R        R ham stretch  [id number]      S/l hip flexor stretch and quad stretch  [id number] light      Exercise Diary         Bike   Nu step 5 min      LTR B 5x:10 5 x 10      SKTC  Attempted, too painful at hip       Add set w/ ball  [id number]      bridges  10      SAQ  2# 2x10      Standing hip 3 way  10 flex & abd      Stand ham curls  10      Side stepping        Step ups FW        Step ups lateral                SLS firm                                                                        Modalities                                Progress as able  [facility] paged. Dr. Delaney ordered Varicella Zoster swab and changed patient's diet to Regular. Lab informed RN that turn around time for this result is 3-5 days. RN communicated to EUSEBIO Delarosa patient's concern with diet change indicating no possible procedure today, and questions regarding this line of inquiry. Isaura informed RN that Dr. Delaney will call patient to discuss.

## 2023-06-07 ENCOUNTER — TELEPHONE (OUTPATIENT)
Dept: INTERNAL MEDICINE CLINIC | Facility: CLINIC | Age: 88
End: 2023-06-07

## 2023-06-07 DIAGNOSIS — N39.0 UTI (URINARY TRACT INFECTION): Primary | ICD-10-CM

## 2023-06-07 RX ORDER — CEPHALEXIN 250 MG/1
250 CAPSULE ORAL EVERY 8 HOURS SCHEDULED
Qty: 15 CAPSULE | Refills: 0 | Status: SHIPPED | OUTPATIENT
Start: 2023-06-07 | End: 2023-06-12

## 2023-06-07 NOTE — TELEPHONE ENCOUNTER
Patient believes she has a bladder infection  I offered two different appointment times today, but she is unable to get to the office  Patient's daughter is asking if you can send something in for her?

## 2023-07-11 ENCOUNTER — HOSPITAL ENCOUNTER (INPATIENT)
Facility: HOSPITAL | Age: 88
LOS: 1 days | Discharge: HOME HEALTH | DRG: 093 | End: 2023-07-13
Attending: EMERGENCY MEDICINE | Admitting: INTERNAL MEDICINE
Payer: MEDICARE

## 2023-07-11 ENCOUNTER — APPOINTMENT (EMERGENCY)
Dept: RADIOLOGY | Facility: HOSPITAL | Age: 88
DRG: 093 | End: 2023-07-11
Payer: MEDICARE

## 2023-07-11 DIAGNOSIS — K59.00 CONSTIPATION, UNSPECIFIED CONSTIPATION TYPE: ICD-10-CM

## 2023-07-11 DIAGNOSIS — R79.89 ABNORMAL TSH: ICD-10-CM

## 2023-07-11 DIAGNOSIS — R41.82 ALTERED MENTAL STATUS: Primary | ICD-10-CM

## 2023-07-11 PROBLEM — F32.A ANXIETY AND DEPRESSION: Status: ACTIVE | Noted: 2022-06-10

## 2023-07-11 LAB
ALBUMIN SERPL BCP-MCNC: 3.8 G/DL (ref 3.5–5)
ALP SERPL-CCNC: 89 U/L (ref 46–116)
ALT SERPL W P-5'-P-CCNC: 16 U/L (ref 12–78)
ANION GAP SERPL CALCULATED.3IONS-SCNC: 7 MMOL/L
APTT PPP: 29 SECONDS (ref 23–37)
AST SERPL W P-5'-P-CCNC: 17 U/L (ref 5–45)
BACTERIA UR QL AUTO: ABNORMAL /HPF
BASOPHILS # BLD AUTO: 0.04 THOUSANDS/ÂΜL (ref 0–0.1)
BASOPHILS NFR BLD AUTO: 0 % (ref 0–1)
BILIRUB SERPL-MCNC: 0.52 MG/DL (ref 0.2–1)
BILIRUB UR QL STRIP: NEGATIVE
BUN SERPL-MCNC: 12 MG/DL (ref 5–25)
CALCIUM SERPL-MCNC: 9.6 MG/DL (ref 8.3–10.1)
CHLORIDE SERPL-SCNC: 110 MMOL/L (ref 96–108)
CLARITY UR: CLEAR
CO2 SERPL-SCNC: 25 MMOL/L (ref 21–32)
COLOR UR: YELLOW
CREAT SERPL-MCNC: 0.99 MG/DL (ref 0.6–1.3)
EOSINOPHIL # BLD AUTO: 0.07 THOUSAND/ÂΜL (ref 0–0.61)
EOSINOPHIL NFR BLD AUTO: 1 % (ref 0–6)
ERYTHROCYTE [DISTWIDTH] IN BLOOD BY AUTOMATED COUNT: 12.6 % (ref 11.6–15.1)
GFR SERPL CREATININE-BSD FRML MDRD: 51 ML/MIN/1.73SQ M
GLUCOSE SERPL-MCNC: 102 MG/DL (ref 65–140)
GLUCOSE UR STRIP-MCNC: NEGATIVE MG/DL
HCT VFR BLD AUTO: 40.6 % (ref 34.8–46.1)
HGB BLD-MCNC: 13.2 G/DL (ref 11.5–15.4)
HGB UR QL STRIP.AUTO: ABNORMAL
HYALINE CASTS #/AREA URNS LPF: ABNORMAL /LPF
IMM GRANULOCYTES # BLD AUTO: 0.03 THOUSAND/UL (ref 0–0.2)
IMM GRANULOCYTES NFR BLD AUTO: 0 % (ref 0–2)
KETONES UR STRIP-MCNC: NEGATIVE MG/DL
LEUKOCYTE ESTERASE UR QL STRIP: NEGATIVE
LYMPHOCYTES # BLD AUTO: 1.43 THOUSANDS/ÂΜL (ref 0.6–4.47)
LYMPHOCYTES NFR BLD AUTO: 15 % (ref 14–44)
MAGNESIUM SERPL-MCNC: 2.1 MG/DL (ref 1.6–2.6)
MCH RBC QN AUTO: 32 PG (ref 26.8–34.3)
MCHC RBC AUTO-ENTMCNC: 32.5 G/DL (ref 31.4–37.4)
MCV RBC AUTO: 98 FL (ref 82–98)
MONOCYTES # BLD AUTO: 0.54 THOUSAND/ÂΜL (ref 0.17–1.22)
MONOCYTES NFR BLD AUTO: 6 % (ref 4–12)
MUCOUS THREADS UR QL AUTO: ABNORMAL
NEUTROPHILS # BLD AUTO: 7.43 THOUSANDS/ÂΜL (ref 1.85–7.62)
NEUTS SEG NFR BLD AUTO: 78 % (ref 43–75)
NITRITE UR QL STRIP: NEGATIVE
NON-SQ EPI CELLS URNS QL MICRO: ABNORMAL /HPF
NRBC BLD AUTO-RTO: 0 /100 WBCS
PH UR STRIP.AUTO: 5 [PH] (ref 4.5–8)
PHOSPHATE SERPL-MCNC: 4.1 MG/DL (ref 2.3–4.1)
PLATELET # BLD AUTO: 155 THOUSANDS/UL (ref 149–390)
PMV BLD AUTO: 11 FL (ref 8.9–12.7)
POTASSIUM SERPL-SCNC: 4 MMOL/L (ref 3.5–5.3)
PROT SERPL-MCNC: 7 G/DL (ref 6.4–8.4)
PROT UR STRIP-MCNC: ABNORMAL MG/DL
RBC # BLD AUTO: 4.13 MILLION/UL (ref 3.81–5.12)
RBC #/AREA URNS AUTO: ABNORMAL /HPF
SODIUM SERPL-SCNC: 142 MMOL/L (ref 135–147)
SP GR UR STRIP.AUTO: >=1.03 (ref 1–1.03)
T4 FREE SERPL-MCNC: 0.97 NG/DL (ref 0.61–1.12)
TSH SERPL DL<=0.05 MIU/L-ACNC: 0.19 UIU/ML (ref 0.45–4.5)
UROBILINOGEN UR QL STRIP.AUTO: 0.2 E.U./DL
VIT B12 SERPL-MCNC: 294 PG/ML (ref 180–914)
WBC # BLD AUTO: 9.54 THOUSAND/UL (ref 4.31–10.16)
WBC #/AREA URNS AUTO: ABNORMAL /HPF

## 2023-07-11 PROCEDURE — 80053 COMPREHEN METABOLIC PANEL: CPT

## 2023-07-11 PROCEDURE — 84100 ASSAY OF PHOSPHORUS: CPT | Performed by: STUDENT IN AN ORGANIZED HEALTH CARE EDUCATION/TRAINING PROGRAM

## 2023-07-11 PROCEDURE — 85025 COMPLETE CBC W/AUTO DIFF WBC: CPT

## 2023-07-11 PROCEDURE — 80307 DRUG TEST PRSMV CHEM ANLYZR: CPT | Performed by: STUDENT IN AN ORGANIZED HEALTH CARE EDUCATION/TRAINING PROGRAM

## 2023-07-11 PROCEDURE — 99285 EMERGENCY DEPT VISIT HI MDM: CPT | Performed by: EMERGENCY MEDICINE

## 2023-07-11 PROCEDURE — 99223 1ST HOSP IP/OBS HIGH 75: CPT | Performed by: INTERNAL MEDICINE

## 2023-07-11 PROCEDURE — 83735 ASSAY OF MAGNESIUM: CPT | Performed by: STUDENT IN AN ORGANIZED HEALTH CARE EDUCATION/TRAINING PROGRAM

## 2023-07-11 PROCEDURE — 36415 COLL VENOUS BLD VENIPUNCTURE: CPT

## 2023-07-11 PROCEDURE — 70450 CT HEAD/BRAIN W/O DYE: CPT

## 2023-07-11 PROCEDURE — 85730 THROMBOPLASTIN TIME PARTIAL: CPT | Performed by: STUDENT IN AN ORGANIZED HEALTH CARE EDUCATION/TRAINING PROGRAM

## 2023-07-11 PROCEDURE — 84439 ASSAY OF FREE THYROXINE: CPT

## 2023-07-11 PROCEDURE — 83918 ORGANIC ACIDS TOTAL QUANT: CPT | Performed by: STUDENT IN AN ORGANIZED HEALTH CARE EDUCATION/TRAINING PROGRAM

## 2023-07-11 PROCEDURE — G1004 CDSM NDSC: HCPCS

## 2023-07-11 PROCEDURE — 99285 EMERGENCY DEPT VISIT HI MDM: CPT

## 2023-07-11 PROCEDURE — 93005 ELECTROCARDIOGRAM TRACING: CPT

## 2023-07-11 PROCEDURE — 81001 URINALYSIS AUTO W/SCOPE: CPT

## 2023-07-11 PROCEDURE — 82607 VITAMIN B-12: CPT | Performed by: EMERGENCY MEDICINE

## 2023-07-11 PROCEDURE — 84443 ASSAY THYROID STIM HORMONE: CPT

## 2023-07-11 RX ORDER — LIDOCAINE 50 MG/G
1 PATCH TOPICAL DAILY
Status: DISCONTINUED | OUTPATIENT
Start: 2023-07-12 | End: 2023-07-13 | Stop reason: HOSPADM

## 2023-07-11 RX ORDER — PANTOPRAZOLE SODIUM 20 MG/1
20 TABLET, DELAYED RELEASE ORAL
Status: DISCONTINUED | OUTPATIENT
Start: 2023-07-12 | End: 2023-07-13 | Stop reason: HOSPADM

## 2023-07-11 RX ORDER — ENOXAPARIN SODIUM 100 MG/ML
40 INJECTION SUBCUTANEOUS DAILY
Status: DISCONTINUED | OUTPATIENT
Start: 2023-07-12 | End: 2023-07-13 | Stop reason: HOSPADM

## 2023-07-11 RX ORDER — POLYETHYLENE GLYCOL 3350 17 G/17G
17 POWDER, FOR SOLUTION ORAL DAILY
Status: DISCONTINUED | OUTPATIENT
Start: 2023-07-12 | End: 2023-07-13 | Stop reason: HOSPADM

## 2023-07-11 RX ORDER — LORAZEPAM 0.5 MG/1
0.5 TABLET ORAL 2 TIMES DAILY
Status: DISCONTINUED | OUTPATIENT
Start: 2023-07-11 | End: 2023-07-13 | Stop reason: HOSPADM

## 2023-07-11 RX ADMIN — LORAZEPAM 0.5 MG: 0.5 TABLET ORAL at 21:08

## 2023-07-11 RX ADMIN — METOPROLOL TARTRATE 25 MG: 25 TABLET, FILM COATED ORAL at 21:08

## 2023-07-11 NOTE — ASSESSMENT & PLAN NOTE
Daughter states that the patient takes omeprazole at home    Plan:   - Will be on pantoprazole PO, instead of omeprazole due to formulary differences

## 2023-07-11 NOTE — ASSESSMENT & PLAN NOTE
The patient has been taking ativan 0.5 mg BID for 25-30 years per daughter. Her  passed away approximately 6 years ago and she deals with significant anxiety and depression.      Plan:  -Continue ativan 0.5 mg  - Consider additional therapy for anxiety and depression as patient is not interested in therapy

## 2023-07-11 NOTE — ED ATTENDING ATTESTATION
7/11/2023  Jeferson Aaron DO, saw and evaluated the patient. I have discussed the patient with the resident/non-physician practitioner and agree with the resident's/non-physician practitioner's findings, Plan of Care, and MDM as documented in the resident's/non-physician practitioner's note, except where noted. All available labs and Radiology studies were reviewed. I was present for key portions of any procedure(s) performed by the resident/non-physician practitioner and I was immediately available to provide assistance. At this point I agree with the current assessment done in the Emergency Department. I have conducted an independent evaluation of this patient a history and physical is as follows:    81-year-old female presents via EMS with her daughters for evaluation of increased confusion, forgetfulness, decreased ADLs including dietary intake and personal hygiene. Symptoms have been occurring for approximately 1 week. About a month ago she had a UTI which had similar, milder symptoms. She has no discrete history of dementia. She lives with her daughter, Reed Guzman, who is present in the ED. Her daughter is feeling overwhelmed with redirecting and providing care for her mother at this point. In the ED, she told her daughters that she was seeing little people walking around. No known psychiatric history. No recent travel or sick contacts. ROS: Denies f/c, HA, CP, SOB, abdominal pain, n/v/d. 12 system ROS o/w negative. PE: NAD, appears comfortable, alert, pleasant, interactive and cooperative; PERRL, EOMI; MMM, no posterior oropharyngeal exudate, edema or erythema; HRR, no murmur, monitor shows sinus rhythm at 85 bpm; lungs CTA w/o w/r/r, POx 97% on RA (nl); abdomen s/nt/nd, nl BS in all 4 quadrant; (-) LE edema or calf TTP, FROM extremities x4; skin p/w/d; CNs GI/NF, oriented to person and place. MDM/DDx:  Increased confusion - occult infection, onset of dementia, less likely but at risk for psychiatric cause. I independently reviewed and interpreted ordered labs and EKG from this encounter. A/P: Will check general labs, EKG, urine, treat symptoms as necessary, reevaluate for further work up and disposition. Patient may need inpatient stay for case management assistance to long-term placement.     ED Course         Critical Care Time  Procedures

## 2023-07-11 NOTE — ED PROVIDER NOTES
History  Chief Complaint   Patient presents with   • Altered Mental Status     Arrived VIA EMS. Daughter told EMS that mother has become increasingly more forgetful. Patient agrees she has been having lapses in memory. Patient states she is " seeing little people" for about the past week. Had UTI 1 month ago. 73-year-old female is presenting with new onset confusion, worsening memory lapses, and new hallucinations. Patient's daughter Ada Crespo who is also her primary caretaker reports that for the past several days at least the patient has been complaining of seeing objects such as airplanes, horses, seals, and on my examination "little people" running near the periphery of her vision. The patient herself denies any subjective symptoms such as nausea, vomiting, fevers, chills, urinary changes, bowel changes, change in appetite, fatigue, chest pain, or shortness of breath. Patient's daughter says that earlier in the week the patient said "I have a UTI" but could not elaborate on what this meant. Patient has no known trauma, is not on any anticoagulation or antiplatelet medications, and reportedly has not been localizing any pain. Daughter does report that today the patient became belligerent and was "attacking" her which is very out of character for her over the recent past.          Prior to Admission Medications   Prescriptions Last Dose Informant Patient Reported? Taking?    Diclofenac Sodium (VOLTAREN) 1 %   No No   Sig: Apply 2 g topically 4 (four) times a day   LORazepam (ATIVAN) 0.5 mg tablet   No No   Sig: Take 1 tablet (0.5 mg total) by mouth 2 (two) times a day for 15 days   acetaminophen (TYLENOL) 325 mg tablet   No No   Sig: Take 2 tablets (650 mg total) by mouth every 6 (six) hours as needed for mild pain   amLODIPine (NORVASC) 5 mg tablet   No No   Sig: Take 1 tablet (5 mg total) by mouth daily   cyanocobalamin (CYANOCOBALAMIN) 100 MCG TABS  Child No No   Sig: Take 1.5 tablets (150 mcg total) by mouth daily   latanoprost (XALATAN) 0.005 % ophthalmic solution  Child Yes No   lidocaine (LIDODERM) 5 %   No No   Sig: Apply 1 patch topically over 12 hours daily Remove & Discard patch within 12 hours or as directed by MD Do not start before January 23, 2023. metoprolol tartrate (LOPRESSOR) 25 mg tablet   No No   Sig: Take 1 tablet (25 mg total) by mouth 2 (two) times a day   omeprazole (PriLOSEC) 20 mg delayed release capsule   No No   Sig: Take 1 capsule (20 mg total) by mouth daily   oxyCODONE (ROXICODONE) 5 immediate release tablet   Yes Yes   Sig: Take 10 mg by mouth every 8 (eight) hours   polyethylene glycol (MIRALAX) 17 g packet  Child No No   Sig: Take 17 g by mouth as needed (constipation) for up to 10 doses      Facility-Administered Medications: None       Past Medical History:   Diagnosis Date   • Allergic rhinitis    • Anxiety    • Diverticulitis of large intestine without perforation or abscess without bleeding    • Dysthymic disorder    • Gastro-esophageal reflux disease without esophagitis    • HLD (hyperlipidemia)    • Hypertension    • Osteopenia    • Palpitations    • Paresthesia    • Stroke [facility])        Past Surgical History:   Procedure Laterality Date   • APPENDECTOMY     • CHOLECYSTECTOMY     • COLON SURGERY     • FL INJECTION RIGHT HIP (NON ARTHROGRAM)  8/28/2019   • HYSTERECTOMY         Family History   Problem Relation Age of Onset   • Hypertension Mother    • Hypertension Father      I have reviewed and agree with the history as documented.     E-Cigarette/Vaping   • E-Cigarette Use Never User      E-Cigarette/Vaping Substances   • Nicotine No    • THC No    • CBD No    • Flavoring No    • Other No    • Unknown No      Social History     Tobacco Use   • Smoking status: Former   • Smokeless tobacco: Never   Vaping Use   • Vaping Use: Never used   Substance Use Topics   • Alcohol use: Not Currently   • Drug use: Not Currently        Review of Systems   Constitutional: Negative for chills and fever. HENT: Negative for ear pain and sore throat. Eyes: Negative for pain and visual disturbance. Respiratory: Negative for cough and shortness of breath. Cardiovascular: Negative for chest pain and palpitations. Gastrointestinal: Negative for abdominal pain and vomiting. Genitourinary: Negative for dysuria and hematuria. Musculoskeletal: Negative for arthralgias and back pain. Skin: Negative for color change and rash. Neurological: Negative for seizures and syncope. Psychiatric/Behavioral: Positive for behavioral problems, confusion and hallucinations. All other systems reviewed and are negative. Physical Exam  ED Triage Vitals   Temperature Pulse Respirations Blood Pressure SpO2   07/11/23 1417 07/11/23 1420 07/11/23 1417 07/11/23 1417 07/11/23 1417   97.9 °F (36.6 °C) 85 16 141/64 97 %      Temp Source Heart Rate Source Patient Position - Orthostatic VS BP Location FiO2 (%)   07/11/23 1417 -- 07/11/23 1417 07/11/23 1703 --   Oral  Lying Right arm       Pain Score       07/11/23 1930       No Pain             Orthostatic Vital Signs  Vitals:    07/13/23 0824 07/13/23 0828 07/13/23 1130 07/13/23 1351   BP: (!) 193/98 (!) 193/98 152/79 143/73   Pulse: 75 75 72 76   Patient Position - Orthostatic VS:           Physical Exam  Vitals and nursing note reviewed. Constitutional:       General: She is not in acute distress. Appearance: She is well-developed and normal weight. She is not toxic-appearing or diaphoretic. HENT:      Head: Normocephalic and atraumatic. Right Ear: External ear normal.      Left Ear: External ear normal.      Nose: Nose normal. No congestion or rhinorrhea. Mouth/Throat:      Mouth: Mucous membranes are moist.      Pharynx: Oropharynx is clear. No oropharyngeal exudate or posterior oropharyngeal erythema. Eyes:      General: No visual field deficit or scleral icterus. Extraocular Movements: Extraocular movements intact. Conjunctiva/sclera: Conjunctivae normal.      Pupils: Pupils are equal, round, and reactive to light. Cardiovascular:      Rate and Rhythm: Normal rate and regular rhythm. Pulses: Normal pulses. Heart sounds: Normal heart sounds. No murmur heard. Pulmonary:      Effort: Pulmonary effort is normal. No respiratory distress. Breath sounds: Normal breath sounds. No wheezing or rhonchi. Abdominal:      General: Abdomen is flat. There is no distension. Palpations: Abdomen is soft. Tenderness: There is no abdominal tenderness. There is no guarding. Musculoskeletal:         General: No swelling. Cervical back: Neck supple. No rigidity. Right lower leg: No edema. Left lower leg: No edema. Lymphadenopathy:      Cervical: No cervical adenopathy. Skin:     General: Skin is warm and dry. Capillary Refill: Capillary refill takes less than 2 seconds. Coloration: Skin is not jaundiced. Findings: No rash. Neurological:      General: No focal deficit present. Mental Status: She is alert. She is disoriented and confused. GCS: GCS eye subscore is 4. GCS verbal subscore is 4. GCS motor subscore is 6. Cranial Nerves: No cranial nerve deficit, dysarthria or facial asymmetry. Sensory: No sensory deficit. Gait: Gait normal.      Comments: Oriented to person, place, but thought that today was Wednesday and the year was 2021. Psychiatric:         Mood and Affect: Mood is not depressed. Cognition and Memory: Cognition is impaired. Memory is impaired.       Comments: Patient frequently loses track of her thought process, has had some repetitive questioning, does endorse seeing "seals" and "little people" running through the hallways of the hospital.         ED Medications  Medications   amLODIPine (NORVASC) tablet 5 mg (5 mg Oral Given 7/13/23 1131)       Diagnostic Studies  Results Reviewed     Procedure Component Value Units Date/Time Toxicology screen, urine [[id number]] Collected: 07/11/23 2015    Lab Status: Final result Specimen: Urine, Other Updated: 07/13/23 0206     Amphetamine Screen, Ur Negative ng/mL      Barbiturate Screen, Ur Negative ng/mL      Cannabinoid Scrn, Ur Negative ng/mL      Methadone Screen, Urine Negative ng/mL      Opiate Scrn, Ur Negative ng/mL      Phencyclidine (PCP), Qual, Ur Negative ng/mL      Benzodiazepines Negative ng/mL      Cocaine (Metab.) Urine Negative ng/mL      Propoxyphene Screen, Urine Negative ng/mL     Narrative:      Performed at:  [address]  [id number]  : Camila Whitt MD, Phone:  [phone]    Basic metabolic panel [[id number]]  (Abnormal) Collected: 07/12/23 0501    Lab Status: Final result Specimen: Blood from Arm, Left Updated: 07/12/23 0539     Sodium 140 mmol/L      Potassium 3.8 mmol/L      Chloride 112 mmol/L      CO2 27 mmol/L      ANION GAP 1 mmol/L      BUN 10 mg/dL      Creatinine 0.78 mg/dL      Glucose 92 mg/dL      Calcium 9.7 mg/dL      eGFR 68 ml/min/1.73sq m     Narrative:      [place] guidelines for Chronic Kidney Disease (CKD):   •  Stage 1 with normal or high GFR (GFR > 90 mL/min/1.73 square meters)  •  Stage 2 Mild CKD (GFR = 60-89 mL/min/1.73 square meters)  •  Stage 3A Moderate CKD (GFR = 45-59 mL/min/1.73 square meters)  •  Stage 3B Moderate CKD (GFR = 30-44 mL/min/1.73 square meters)  •  Stage 4 Severe CKD (GFR = 15-29 mL/min/1.73 square meters)  •  Stage 5 End Stage CKD (GFR <15 mL/min/1.73 square meters)  Note: GFR calculation is accurate only with a steady state creatinine    Protime-INR [[id number]]  (Normal) Collected: 07/12/23 0501    Lab Status: Final result Specimen: Blood from Arm, Left Updated: 07/12/23 0531     Protime 14.0 seconds      INR 1.05    CBC (With Platelets) [[id number]]  (Normal) Collected: 07/12/23 0501    Lab Status: Final result Specimen: Blood from Arm, Left Updated: 07/12/23 0511     WBC 6.56 Thousand/uL      RBC 4.03 Million/uL      Hemoglobin 13.1 g/dL      Hematocrit 38.2 %      MCV 95 fL      MCH 32.5 pg      MCHC 34.3 g/dL      RDW 12.5 %      Platelets 347 Thousands/uL      MPV 10.4 fL     Phosphorus [[id number]]  (Normal) Collected: 07/11/23 1431    Lab Status: Final result Specimen: Blood from Arm, Left Updated: 07/11/23 2116     Phosphorus 4.1 mg/dL     Magnesium [[id number]]  (Normal) Collected: 07/11/23 1431    Lab Status: Final result Specimen: Blood from Arm, Left Updated: 07/11/23 2116     Magnesium 2.1 mg/dL     Vitamin B12 [[id number]]  (Normal) Collected: 07/11/23 1431    Lab Status: Final result Specimen: Blood from Arm, Left Updated: 07/11/23 1929     Vitamin B-12 294 pg/mL     APTT [[id number]]  (Normal) Collected: 07/11/23 1832    Lab Status: Final result Specimen: Blood from Arm, Left Updated: 07/11/23 1902     PTT 29 seconds     T4, free [[id number]]  (Normal) Collected: 07/11/23 1431    Lab Status: Final result Specimen: Blood from Arm, Left Updated: 07/11/23 1703     Free T4 0.97 ng/dL     Comprehensive metabolic panel [[id number]]  (Abnormal) Collected: 07/11/23 1431    Lab Status: Final result Specimen: Blood from Arm, Left Updated: 07/11/23 1540     Sodium 142 mmol/L      Potassium 4.0 mmol/L      Chloride 110 mmol/L      CO2 25 mmol/L      ANION GAP 7 mmol/L      BUN 12 mg/dL      Creatinine 0.99 mg/dL      Glucose 102 mg/dL      Calcium 9.6 mg/dL      AST 17 U/L      ALT 16 U/L      Alkaline Phosphatase 89 U/L      Total Protein 7.0 g/dL      Albumin 3.8 g/dL      Total Bilirubin 0.52 mg/dL      eGFR 51 ml/min/1.73sq m     Narrative:      [place] guidelines for Chronic Kidney Disease (CKD):   •  Stage 1 with normal or high GFR (GFR > 90 mL/min/1.73 square meters)  •  Stage 2 Mild CKD (GFR = 60-89 mL/min/1.73 square meters)  •  Stage 3A Moderate CKD (GFR = 45-59 mL/min/1.73 square meters)  •  Stage 3B Moderate CKD (GFR = 30-44 mL/min/1.73 square meters)  •  Stage 4 Severe CKD (GFR = 15-29 mL/min/1.73 square meters)  •  Stage 5 End Stage CKD (GFR <15 mL/min/1.73 square meters)  Note: GFR calculation is accurate only with a steady state creatinine    TSH, 3rd generation with Free T4 reflex [[id number]]  (Abnormal) Collected: 07/11/23 1431    Lab Status: Final result Specimen: Blood from Arm, Left Updated: 07/11/23 1527     TSH 3RD GENERATON 0.188 uIU/mL     Urine Microscopic [[id number]]  (Abnormal) Collected: 07/11/23 1435    Lab Status: Final result Specimen: Urine, Clean Catch Updated: 07/11/23 1516     RBC, UA 1-2 /hpf      WBC, UA 1-2 /hpf      Epithelial Cells Occasional /hpf      Bacteria, UA Occasional /hpf      MUCUS THREADS Occasional     Hyaline Casts, UA 25-50 /lpf     CBC and differential [[id number]]  (Abnormal) Collected: 07/11/23 1431    Lab Status: Final result Specimen: Blood from Arm, Left Updated: 07/11/23 1445     WBC 9.54 Thousand/uL      RBC 4.13 Million/uL      Hemoglobin 13.2 g/dL      Hematocrit 40.6 %      MCV 98 fL      MCH 32.0 pg      MCHC 32.5 g/dL      RDW 12.6 %      MPV 11.0 fL      Platelets 773 Thousands/uL      nRBC 0 /100 WBCs      Neutrophils Relative 78 %      Immat GRANS % 0 %      Lymphocytes Relative 15 %      Monocytes Relative 6 %      Eosinophils Relative 1 %      Basophils Relative 0 %      Neutrophils Absolute 7.43 Thousands/µL      Immature Grans Absolute 0.03 Thousand/uL      Lymphocytes Absolute 1.43 Thousands/µL      Monocytes Absolute 0.54 Thousand/µL      Eosinophils Absolute 0.07 Thousand/µL      Basophils Absolute 0.04 Thousands/µL     Urine Macroscopic, POC [[id number]]  (Abnormal) Collected: 07/11/23 1435    Lab Status: Final result Specimen: Urine Updated: 07/11/23 1437     Color, UA Yellow     Clarity, UA Clear     pH, UA 5.0     Leukocytes, UA Negative     Nitrite, UA Negative     Protein, UA Trace mg/dl      Glucose, UA Negative mg/dl      Ketones, UA Negative mg/dl Urobilinogen, UA 0.2 E.U./dl      Bilirubin, UA Negative     Occult Blood, UA Trace     Specific Gravity, UA >=1.030    Narrative:      [facility] RESULT                 CT head without contrast   Final Result by Maribel Nava MD (07/11 1655)      No acute intracranial abnormality. Workstation performed: [id number]               Procedures  Procedures      ED Course  ED Course as of 07/14/23 1151   Tue Jul 11, 2023   1626 ECG interpreted by me. Date: 7/11/2023. Time: 1438. Rate: 85 bpm Axis: right. Rhythm: Regular. There is artifact associated with this ECG, the AI generated interpretation is mistaking artifact for atrial fibrillation. This is a sinus rhythm as indicated by the rhythm strips. I do not see any ST elevations or depressions, I see no T wave flattening or inversions. Interpretation: Abnormal ECG, sinus rhythm. Medical Decision Making  Assessment: 64-year-old female presenting with altered mental status and concern for new hallucinations. Considering possible intracranial etiology of these hallucinations. More likely due to polypharmacy in setting of recent increased dose of opiate. Patient is also on benzodiazepine and other medications which are known to cause altered mental status. Also consider diagnosis of possible urinary tract infection as this is caused similar symptoms in the past for this patient. We will screen with urinalysis, will screen with CBC for anemia or severe leukocytosis or left shift. We will screen metabolic panel to look for hyponatremia or hyper or hypocalcemia which have also been known to cause some changes in mental status. We will screen B12. Reassessment/disposition: Patient's head CT is negative for any acute intracranial process though does show some chronic changes. Labs have some abnormalities including a low TSH but with normal T4.   Unclear etiology of patient's symptoms at this time, using shared decision making with family we decided that best course of action at this time is inpatient admission with neurology consult and ongoing work-up and evaluation of her acute symptoms. May also benefit from geriatrics consult for polypharmacy and medication optimization. Amount and/or Complexity of Data Reviewed  Labs: ordered. Radiology: ordered. Risk  Decision regarding hospitalization. Disposition  Final diagnoses: Altered mental status   Abnormal TSH     Time reflects when diagnosis was documented in both MDM as applicable and the Disposition within this note     Time User Action Codes Description Comment    7/11/2023  6:02 PM Rashel Derrick Add [R41.82] Altered mental status     7/11/2023  6:02 PM Rashel Meza Add [R79.89] Abnormal TSH     7/13/2023  2:32 PM Jam Taipa Add [K59.00] Constipation, unspecified constipation type Discussed supportive therapy, recommend stool softners      ED Disposition     ED Disposition   Admit    Condition   Stable    Date/Time   Tue Jul 11, 2023  6:02 PM    Comment   Case was discussed with SOD and the patient's admission status was agreed to be Admission Status: observation status to the service of Dr. Ace Mosquera .            Follow-up Information     Follow up With Specialties Details Why Contact Info    Yulia Gómez MD Internal Medicine Follow up in 1 week(s)  [address] [facility]  [id number] [place] [address] [address] [id number]  3 [facility], [address] [address] [address]  [address] [place] [address] 65 [facility]  [phone]            Discharge Medication List as of 7/13/2023  2:36 PM      START taking these medications    Details   methocarbamol (ROBAXIN) 500 mg tablet Take 0.5 tablets (250 mg total) by mouth every 8 (eight) hours for 5 days, Starting u 7/13/2023, Until Tue 7/18/2023, Normal         CONTINUE these medications which have CHANGED    Details   amLODIPine (NORVASC) 10 mg tablet Take 1 tablet (10 mg total) by mouth daily Do not start before July 14, 2023., Starting Fri 7/14/2023, Until Thu 10/12/2023, Normal      polyethylene glycol (MIRALAX) 17 g packet Take 17 g by mouth as needed (constipation) for up to 10 doses, Starting Thu 7/13/2023, Normal         CONTINUE these medications which have NOT CHANGED    Details   acetaminophen (TYLENOL) 325 mg tablet Take 2 tablets (650 mg total) by mouth every 6 (six) hours as needed for mild pain, Starting Sun 1/22/2023, Normal      cyanocobalamin (CYANOCOBALAMIN) 100 MCG TABS Take 1.5 tablets (150 mcg total) by mouth daily, Starting Fri 6/3/2022, Until Fri 9/23/2022, Normal      Diclofenac Sodium (VOLTAREN) 1 % Apply 2 g topically 4 (four) times a day, Starting Sun 1/22/2023, Normal      latanoprost (XALATAN) 0.005 % ophthalmic solution Starting Mon 2/8/2021, Historical Med      lidocaine (LIDODERM) 5 % Apply 1 patch topically over 12 hours daily Remove & Discard patch within 12 hours or as directed by MD Do not start before January 23, 2023., Starting Mon 1/23/2023, Normal      LORazepam (ATIVAN) 0.5 mg tablet Take 1 tablet (0.5 mg total) by mouth 2 (two) times a day for 15 days, Starting Fri 4/21/2023, Until Sat 5/6/2023, Normal      metoprolol tartrate (LOPRESSOR) 25 mg tablet Take 1 tablet (25 mg total) by mouth 2 (two) times a day, Starting Mon 1/16/2023, Normal      omeprazole (PriLOSEC) 20 mg delayed release capsule Take 1 capsule (20 mg total) by mouth daily, Starting Mon 1/16/2023, Normal         STOP taking these medications       oxyCODONE (ROXICODONE) 5 immediate release tablet Comments:   Reason for Stopping:                 PDMP Review       Value Time User    PDMP Reviewed  Yes 7/12/2023 11:18 AM Tuesday Shahrzad Eke, [address] [facility]           ED Provider  Attending physically available and evaluated Dian Alexandre. I managed the patient along with the ED Attending.     Electronically Signed by         Christopher Dorman MD  07/14/23 0595

## 2023-07-11 NOTE — ASSESSMENT & PLAN NOTE
She does not take colace or miralax at home, has bowel movements every 2-4 days.     Plan:  - Bowel regimen in place as patient admits to having prolonged period without bowel movement

## 2023-07-11 NOTE — ASSESSMENT & PLAN NOTE
28-year-old female presents via EMS with her daughters for evaluation of increased confusion, forgetfulness, decreased ADLs including dietary intake and personal hygiene. Symptoms have been occurring for approximately 1 week. About a month ago she had a UTI which had similar, milder symptoms. She has no discrete history of dementia. She lives with her daughter, Trey Hawk, who is present in the ED. Her daughter is feeling overwhelmed with redirecting and providing care for her mother at this point. In the ED, she told her daughters that she was seeing little people walking around. No known psychiatric history. No recent travel or sick contacts.     Workup:  · TSH is decreased with a normal T4  · CMP and CBC unremarkable   · UA is not concerning for infection at this time  · B12 294   · Mag & Phos nl  · Urine tox neg  · PT/OT D/c HHC  · Geriatrics consult complete  · CTH identified decreased attenuation is noted in periventricular and subcortical white matter demonstrating an appearance that is statistically most likely to represent moderate microangiopathic change and ventricles and extra-axial csf spaces consistent with the degree of volume loss; no acute intracranial processes identified    Plan:  - Patient is stable for discharge home on 7/13/2023 with home health care  - Follow-up methylmalonic acid; consider starting vitamin B12 in the outpatient setting  - Continue holding home opioids in the setting of AMS, as this is presumably the most likely etiology of clinical presentation  - Patient currently denies pain, agree w/ recommended geriatrics pain protocol:  · acetaminophen 975 mg po Q 8 goal  · robaxin 250 mg po TID for pain  · Lidoderm patch

## 2023-07-11 NOTE — ASSESSMENT & PLAN NOTE
She has a history of hypertension and on admission 156/69.     Plans:  -Increase Norvasc to 10 mg daily; continue Lopressor 20 mg daily

## 2023-07-12 LAB
ANION GAP SERPL CALCULATED.3IONS-SCNC: 1 MMOL/L
ATRIAL RATE: 394 BPM
BUN SERPL-MCNC: 10 MG/DL (ref 5–25)
CALCIUM SERPL-MCNC: 9.7 MG/DL (ref 8.3–10.1)
CHLORIDE SERPL-SCNC: 112 MMOL/L (ref 96–108)
CO2 SERPL-SCNC: 27 MMOL/L (ref 21–32)
CREAT SERPL-MCNC: 0.78 MG/DL (ref 0.6–1.3)
ERYTHROCYTE [DISTWIDTH] IN BLOOD BY AUTOMATED COUNT: 12.5 % (ref 11.6–15.1)
GFR SERPL CREATININE-BSD FRML MDRD: 68 ML/MIN/1.73SQ M
GLUCOSE SERPL-MCNC: 92 MG/DL (ref 65–140)
HCT VFR BLD AUTO: 38.2 % (ref 34.8–46.1)
HGB BLD-MCNC: 13.1 G/DL (ref 11.5–15.4)
INR PPP: 1.05 (ref 0.84–1.19)
MCH RBC QN AUTO: 32.5 PG (ref 26.8–34.3)
MCHC RBC AUTO-ENTMCNC: 34.3 G/DL (ref 31.4–37.4)
MCV RBC AUTO: 95 FL (ref 82–98)
PLATELET # BLD AUTO: 226 THOUSANDS/UL (ref 149–390)
PMV BLD AUTO: 10.4 FL (ref 8.9–12.7)
POTASSIUM SERPL-SCNC: 3.8 MMOL/L (ref 3.5–5.3)
PROTHROMBIN TIME: 14 SECONDS (ref 11.6–14.5)
QRS AXIS: 119 DEGREES
QRSD INTERVAL: 68 MS
QT INTERVAL: 338 MS
QTC INTERVAL: 402 MS
RBC # BLD AUTO: 4.03 MILLION/UL (ref 3.81–5.12)
SODIUM SERPL-SCNC: 140 MMOL/L (ref 135–147)
T WAVE AXIS: 20 DEGREES
VENTRICULAR RATE: 85 BPM
WBC # BLD AUTO: 6.56 THOUSAND/UL (ref 4.31–10.16)

## 2023-07-12 PROCEDURE — 97163 PT EVAL HIGH COMPLEX 45 MIN: CPT

## 2023-07-12 PROCEDURE — 99223 1ST HOSP IP/OBS HIGH 75: CPT | Performed by: INTERNAL MEDICINE

## 2023-07-12 PROCEDURE — 93010 ELECTROCARDIOGRAM REPORT: CPT | Performed by: INTERNAL MEDICINE

## 2023-07-12 PROCEDURE — 85610 PROTHROMBIN TIME: CPT | Performed by: STUDENT IN AN ORGANIZED HEALTH CARE EDUCATION/TRAINING PROGRAM

## 2023-07-12 PROCEDURE — 80048 BASIC METABOLIC PNL TOTAL CA: CPT | Performed by: STUDENT IN AN ORGANIZED HEALTH CARE EDUCATION/TRAINING PROGRAM

## 2023-07-12 PROCEDURE — 85027 COMPLETE CBC AUTOMATED: CPT | Performed by: STUDENT IN AN ORGANIZED HEALTH CARE EDUCATION/TRAINING PROGRAM

## 2023-07-12 PROCEDURE — 97166 OT EVAL MOD COMPLEX 45 MIN: CPT

## 2023-07-12 PROCEDURE — 99232 SBSQ HOSP IP/OBS MODERATE 35: CPT | Performed by: INTERNAL MEDICINE

## 2023-07-12 RX ORDER — ACETAMINOPHEN 325 MG/1
975 TABLET ORAL EVERY 8 HOURS
Status: DISCONTINUED | OUTPATIENT
Start: 2023-07-12 | End: 2023-07-13 | Stop reason: HOSPADM

## 2023-07-12 RX ORDER — METHOCARBAMOL 500 MG/1
250 TABLET, FILM COATED ORAL EVERY 8 HOURS SCHEDULED
Status: DISCONTINUED | OUTPATIENT
Start: 2023-07-12 | End: 2023-07-13 | Stop reason: HOSPADM

## 2023-07-12 RX ORDER — OXYCODONE HYDROCHLORIDE 5 MG/1
10 TABLET ORAL EVERY 8 HOURS
COMMUNITY
End: 2023-07-13

## 2023-07-12 RX ADMIN — METOPROLOL TARTRATE 25 MG: 25 TABLET, FILM COATED ORAL at 17:15

## 2023-07-12 RX ADMIN — ACETAMINOPHEN 975 MG: 325 TABLET, FILM COATED ORAL at 15:29

## 2023-07-12 RX ADMIN — METHOCARBAMOL 250 MG: 500 TABLET ORAL at 15:30

## 2023-07-12 RX ADMIN — LIDOCAINE 5% 1 PATCH: 700 PATCH TOPICAL at 08:19

## 2023-07-12 RX ADMIN — METOPROLOL TARTRATE 25 MG: 25 TABLET, FILM COATED ORAL at 08:19

## 2023-07-12 RX ADMIN — METHOCARBAMOL 250 MG: 500 TABLET ORAL at 22:40

## 2023-07-12 RX ADMIN — LORAZEPAM 0.5 MG: 0.5 TABLET ORAL at 17:15

## 2023-07-12 RX ADMIN — DICLOFENAC SODIUM TOPICAL GEL, 1%, 2 G: 10 GEL TOPICAL at 08:21

## 2023-07-12 RX ADMIN — PANTOPRAZOLE SODIUM 20 MG: 20 TABLET, DELAYED RELEASE ORAL at 06:02

## 2023-07-12 RX ADMIN — LORAZEPAM 0.5 MG: 0.5 TABLET ORAL at 08:19

## 2023-07-12 RX ADMIN — ACETAMINOPHEN 975 MG: 325 TABLET, FILM COATED ORAL at 22:40

## 2023-07-12 RX ADMIN — ENOXAPARIN SODIUM 40 MG: 40 INJECTION SUBCUTANEOUS at 08:19

## 2023-07-12 RX ADMIN — POLYETHYLENE GLYCOL 3350 17 G: 17 POWDER, FOR SOLUTION ORAL at 08:19

## 2023-07-12 RX ADMIN — PANTOPRAZOLE SODIUM 20 MG: 20 TABLET, DELAYED RELEASE ORAL at 15:30

## 2023-07-12 NOTE — PLAN OF CARE
Problem: PAIN - ADULT  Goal: Verbalizes/displays adequate comfort level or baseline comfort level  Description: Interventions:  - Encourage patient to monitor pain and request assistance  - Assess pain using appropriate pain scale  - Administer analgesics based on type and severity of pain and evaluate response  - Implement non-pharmacological measures as appropriate and evaluate response  - Consider cultural and social influences on pain and pain management  - Notify physician/advanced practitioner if interventions unsuccessful or patient reports new pain  Outcome: Progressing     Problem: INFECTION - ADULT  Goal: Absence or prevention of progression during hospitalization  Description: INTERVENTIONS:  - Assess and monitor for signs and symptoms of infection  - Monitor lab/diagnostic results  - Monitor all insertion sites, i.e. indwelling lines, tubes, and drains  - Monitor endotracheal if appropriate and nasal secretions for changes in amount and color  - [place] appropriate cooling/warming therapies per order  - Administer medications as ordered  - Instruct and encourage patient and family to use good hand hygiene technique  - Identify and instruct in appropriate isolation precautions for identified infection/condition  Outcome: Progressing  Goal: Absence of fever/infection during neutropenic period  Description: INTERVENTIONS:  - Monitor WBC    Outcome: Progressing

## 2023-07-12 NOTE — PLAN OF CARE
Problem: PHYSICAL THERAPY ADULT  Goal: Performs mobility at highest level of function for planned discharge setting. See evaluation for individualized goals. Description: Treatment/Interventions: Functional transfer training, Elevations, Therapeutic exercise, Endurance training, Gait training, Bed mobility  Equipment Recommended:  (pt owns 2 rollators)       See flowsheet documentation for full assessment, interventions and recommendations. Outcome: Progressing  Note: Prognosis: Good     Assessment: Pt is 81 y.o. female seen for PT evaluation s/p admit to [address] [place] [address] on 7/11/2023 w/ Altered mental status, unspecified. PT consulted to assess pt's functional mobility and d/c needs. Order placed for PT eval and tx, w/ up and OOB as tolerated order. Pt agreeable to PT  session upon arrival, pt found seated OOB in chair. PTA, pt was independent w/ all functional mobility w/ rollator, ambulates household distances, has 2 JULIO, lives w/ family in 2 level home and retired. Pt to benefit from continued PT tx to address deficits and maximize level of functional independent mobility and consistency. From PT/mobility standpoint, recommendation at time of d/c would be home with home health rehabilitation pending progress in order to facilitate return to PLOF. Upon conclusion pt  seated in chair. Complexity: Comorbidities affecting pt's physical performance at time of assessment include: anxiety, depression and mild cognitive impairment and arthritis. Personal factors affecting pt at time of IE include: lives in 2 story house, ambulating w/ assistive device, stairs to enter home, decreased cognition, anxiety, visual impairments and depression. Please find objective findings from PT assessment regarding body systems outlined above with impairments and limitations including impaired balance, decreased endurance, gait deviations, pain, decreased safety awareness, impaired judgement and decreased cognition.   Pt's clinical presentation is currently unstable/unpredictable seen in pt's presentation of pain, confusion, agitation and abnormal urine and hallucinations. The patient's AM-PAC Basic Mobility Inpatient Short Form Raw Score is 23. A Raw score of greater than 16 suggests the patient may benefit from discharge to home. Please also refer to the recommendation of the Physical Therapist for safe discharge planning. RN verbalized pt appropriate for session. Barriers to Discharge: None     PT Discharge Recommendation: Home with home health rehabilitation    See flowsheet documentation for full assessment.

## 2023-07-12 NOTE — CONSULTS
Consultation - Geriatrics   Nicol Noland 81 y.o. female MRN: [id number]  Unit/Bed#: 2 210-01 Encounter: [id number]      Assessment/Plan  Altered mental status/ Encephalopathy  Increased agitation/ hallucinations  Alert and oriented x 2 at present  Multifactorial risk factors: medications, hx of UTI, constipation, decrease oral intake  Medications: cannot exclude increased dose of oxycodone to 10 mg po prn as contributing  Hx of UTI, recent treatment of cephalexin, personal review of UA results done 7/11/23, no UTI  Constipation: at risk secondary to opioid use  Decreased oral intake: per daughter, personal review of cmp/cbc done 7/12/23, wnl  Underlying cognitive impairment, places patient at high risk for delirium    Continue to monitor/ maintain delirium precautions:  Provide frequent redirection, reorientation, distraction techniques  Avoid deliriogenic medications such as tramadol, benzodiazepines, anticholinergics,  Benadryl  Treat pain, See geriatric pain protocol  Monitor for constipation and urinary retention  Encourage early and frequent moblization, OOB  Encourage Hydration/ Nutrition  Implement sleep hygiene, limit night time interuptions, group activities      Avoid physical restraints  Use chemical restraint only when other efforts have failed, recommend zyprexa 2.5mg IM q8h prn  Monitor Qtc, if greater than 500 do not use antipsychotic medication  If QTc greater than 460, monitor and replete and deficiency of  K and Mg, recheck EKG    Chronic pain  Follows with palliative as outpatient for symptom management  Recommend geriatric pain protocol  Geriatric pain protocol:  Scheduled acetaminophen 975 mg po Q8 goal of tylenol is to reduce amount of opiod medications needed  Recommend reduced dose of oxycodone  Oxycodone 2.5 mg po Q 4 prn moderate pain  Oxycodone 5 mg po Q 4 prn severe pain   Recommend scheduled robaxin 250 mg po TID for pain, continue to monitor cr  although would normally recommend gabapentin, pt reported pt was previously increased fatigued with medications, will hold off at present  Monitor for constipation  Lidoderm patch    Constipation  Chronic  Pt refusing medications, colace and miralax  Prefers prune juice  Per nursing last BM this am (7/12/23)    Frailty  Clinical Frail Scale: 7- severely frail  Completely dependent for personal care  Per daughter pt has decreased appetitite   Weight 107 (7/11/23)  Weight 101 in January admission, pt with 6 pound weight gain  Bmp  Wnl, Albumin 3.8 (7/11/23), maintain albumin in diet  At present weight gain and normal albumin does not reflect daughter concern of decreased appetite  Recommend ongoing monitoring, continue to offer small frequent meals, nutritious snacks and fluid intake    Cognitive impairment  Per hx  At risk secondary to hx of stroke/TIA  TSH 0.188 (7/11/23)  Vitamin B 12: 293 (7/11/23), recommend vitamin B 12 supplementation: 1000 mcg po daily, goal level greater than 400  CT head (7/11/23): moderate microangiopathic changes I have personally reviewed the images in PACs  Has supportive daughter, 24/7 oversight    Agitation/ depression  Chronic over 40 years, with chronic use of ativan  Do not recommend abrupt withdrawal of benzodiazepines, recommend continued use of ativan 0.5 mg po BID  Pt has expressed frustration with decreased quality of life with chronic pain and immobility  Recommend start of Cymbalta 30 mg po daily, secondary to will help with depression and pain  Monitor sodium 140- (7/12/23) due to potential side effect of hyponatremia  monitor QTc 402 (7/11/23) Qtc prolonging medication    Ambulatory dysfunction  At high risk for falls secondary to severe frailty, pain, medications, underlying cognitive impairment, delirium, hospitalization  Fall precautions  PT/OT  Rehab post hospitalization for optmization    DNR  DNR/ DNI    Home medication review  [facility] pharmacy  Ativan 0.5 mg po BID  omeprazole 20 mg po daily, last refill 4/15/23, # 90 day  Amlodipine 5 mg po daily, last refill 4/25/23 # 90 day supply  Metoprolol 25 mg po BID, last refill 4/15/23 # 90 day supply  Oxycodone 10 mg po Q 8 prn      06/30/2023 06/27/2023   2  Lorazepam 0.5 Mg Tablet 60.00  30  [facility]  [id number]   [facility] ([id number])   1.00 LME  Other  [place]    06/27/2023 06/27/2023   2  Oxycodone Hcl (Ir) 10 Mg Tab 60.00  20  [facility]  [id number]   [facility] ([id number])   45.00 MME  Other  [place]    06/02/2023 06/02/2023   2  Lorazepam 0.5 Mg Tablet 60.00  30  [facility]  [id number]   [facility] ([id number])   1.00 LME  Other  [place]    05/26/2023 05/26/2023   2  Oxycodone Hcl (Ir) 5 Mg Tablet 49.00  16  [facility]  [id number]   [facility] ([id number])   22.97 MME  Private Pay  [place]    05/26/2023 05/26/2023   2  Oxycodone Hcl (Ir) 5 Mg Tablet 11.00  4  [facility]  [id number]   [facility] ([id number])   20.63 MME  Other  [place]    05/04/2023 05/04/2023   2  Lorazepam 0.5 Mg Tablet 60.00  30  [facility]  [id number]   [facility] ([id number])   1.00 LME  Other  [place]    05/04/2023 05/04/2023   2  Oxycodone Hcl (Ir) 5 Mg Tablet 15.00  [facility]  [id number]   Michelle Martinez (             History of Present Illness   Physician Requesting Consult: Heath Cm MD  Reason for Consult / Principal Problem:altered mental status  Hx and PE limited by: NA  HPI: Samir Washington is a 81y.o. year old female who presents with hallucinations for the past week. Additionally she has not been eating as much. She has been combative and hallucinating. Seeing people and animals present, that were not there. She has been having increased pain to her right hip affecting her quality of life. She was seen by hospice 3/10/23 but was not a candidate secondary to no terminal illness. She was referred to [address] [place] [address] for pain management. She was started on gabapentin 100 mg po QHS. Her daughter reports her being more tired with gabapentin. She was transitioned to low dose oxycodone 2.5 mg po Q 8 for pain. On 5/26 oxycodone dose was increased to 5 mg po Q 8 and ativan 0.5 mg po BID was refilled.   On 6/7/23 ordered cephalexin for clinical treatment of UTI/ dysuria per daughters request.  On 6/27 oxycodone was increased to 10 mg po Q 8 prn. She has anxiety, GERD, HTN, hyperlipidemia, cognitive impairment, severe arthritis, ambulatory dysfunction, visual impairment, macular degeneration. Hx of stroke. Prior to arrival pt lives at home with her daughter. She needs assistance with IADLs and ADLs. She often refusing bathing or ambulation. Upon exam pt is oob in recliner chair. She is alert and oriented x2.      Spoke with daughter at length to review HPI and medications    Rounded with nursing in regard to patient care    TT SOD with reccomendations    Inpatient consult to Gerontology  Consult performed by: ELIO Vance  Consult ordered by: Kyrie Mcdonald DO          Review of Systems    Historical Information   Past Medical History:   Diagnosis Date   • Allergic rhinitis    • Anxiety    • Diverticulitis of large intestine without perforation or abscess without bleeding    • Dysthymic disorder    • Gastro-esophageal reflux disease without esophagitis    • HLD (hyperlipidemia)    • Hypertension    • Osteopenia    • Palpitations    • Paresthesia    • Stroke [facility])      Past Surgical History:   Procedure Laterality Date   • APPENDECTOMY     • CHOLECYSTECTOMY     • COLON SURGERY     • FL INJECTION RIGHT HIP (NON ARTHROGRAM)  8/28/2019   • HYSTERECTOMY       Social History   Social History     Substance and Sexual Activity   Alcohol Use Not Currently     Social History     Substance and Sexual Activity   Drug Use Not Currently     Social History     Tobacco Use   Smoking Status Former   Smokeless Tobacco Never         Family History:   Family History   Problem Relation Age of Onset   • Hypertension Mother    • Hypertension Father        Meds/Allergies   Current meds:   Current Facility-Administered Medications   Medication Dose Route Frequency   • Diclofenac Sodium (VOLTAREN) 1 % topical gel 2 g  2 g Topical 4x Daily   • enoxaparin (LOVENOX) subcutaneous injection 40 mg  40 mg Subcutaneous Daily   • lidocaine (LIDODERM) 5 % patch 1 patch  1 patch Topical Daily   • LORazepam (ATIVAN) tablet 0.5 mg  0.5 mg Oral BID   • metoprolol tartrate (LOPRESSOR) tablet 25 mg  25 mg Oral BID   • pantoprazole (PROTONIX) EC tablet 20 mg  20 mg Oral BID AC   • polyethylene glycol (MIRALAX) packet 17 g  17 g Oral Daily           No Known Allergies    Objective   Vitals: Blood pressure (!) 171/91, pulse 88, temperature 97.5 °F (36.4 °C), resp. rate 16, weight 48.7 kg (107 lb 6.4 oz), SpO2 96 %. ,Body mass index is 20.98 kg/m². Physical Exam  Vitals and nursing note reviewed. HENT:      Head: Normocephalic. Nose: No congestion. Mouth/Throat:      Mouth: Mucous membranes are moist.   Eyes:      General:         Right eye: No discharge. Left eye: No discharge. Cardiovascular:      Rate and Rhythm: Normal rate and regular rhythm. Pulses: Normal pulses. Pulmonary:      Effort: Pulmonary effort is normal.      Breath sounds: Normal breath sounds. Abdominal:      General: Bowel sounds are normal.      Palpations: Abdomen is soft. Musculoskeletal:         General: Normal range of motion. Cervical back: Normal range of motion. Skin:     General: Skin is warm and dry. Neurological:      Mental Status: She is alert. She is disoriented. Psychiatric:      Comments: Agitation, periods of hallucinations         Lab Results:   Results from last 7 days   Lab Units 07/12/23  0501   WBC Thousand/uL 6.56   HEMOGLOBIN g/dL 13.1   HEMATOCRIT % 38.2   PLATELETS Thousands/uL 226        Results from last 7 days   Lab Units 07/12/23  0501 07/11/23  1431   POTASSIUM mmol/L 3.8 4.0   CHLORIDE mmol/L 112* 110*   CO2 mmol/L 27 25   BUN mg/dL 10 12   CREATININE mg/dL 0.78 0.99   CALCIUM mg/dL 9.7 9.6   ALK PHOS U/L  --  89   ALT U/L  --  16   AST U/L  --  17       Imaging Studies: I have personally reviewed pertinent reports. EKG, Pathology, and Other Studies: I have personally reviewed pertinent reports.     VTE Prophylaxis: Sequential compression device (Venodyne)     Code Status: Level 3 - DNAR and DNI
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
## 2023-07-12 NOTE — PHYSICAL THERAPY NOTE
PHYSICAL THERAPY EVALUATION  NAME:  Pattie Diez  DATE: 07/12/23    AGE:   81 y.o. Mrn:   [id number]  ADMIT DX:  Altered mental status [R41.82]  Abnormal TSH [R79.89]  Problem List:   Patient Active Problem List   Diagnosis    Dizziness    Chest tightness    Essential hypertension    GERD (gastroesophageal reflux disease)    Chronic idiopathic constipation    Perianal lesion    Word finding difficulty    Mild cognitive impairment    Diverticulitis large intestine w/o perforation or abscess w/o bleeding    Small vessel stroke (HCC)    Mild protein-calorie malnutrition (HCC)    Vitamin B12 deficiency    Anxiety and depression    Ambulatory dysfunction    Altered mental status, unspecified       Past Medical History  Past Medical History:   Diagnosis Date    Allergic rhinitis     Anxiety     Diverticulitis of large intestine without perforation or abscess without bleeding     Dysthymic disorder     Gastro-esophageal reflux disease without esophagitis     HLD (hyperlipidemia)     Hypertension     Osteopenia     Palpitations     Paresthesia     Stroke [facility])        Past Surgical History  Past Surgical History:   Procedure Laterality Date    APPENDECTOMY      CHOLECYSTECTOMY      COLON SURGERY      FL INJECTION RIGHT HIP (NON ARTHROGRAM)  8/28/2019    HYSTERECTOMY         Length Of Stay: 0  Performed at least 2 patient identifiers during session: Name and Birthday       07/12/23 1045   PT Last Visit   PT Visit Date 07/12/23   Note Type   Note type Evaluation   Pain Assessment   Pain Assessment Tool 0-10   Pain Score 10 - Worst Possible Pain   Pain Location/Orientation Orientation: Right;Location: Hip   Pain Onset/Description Onset: Ongoing   Restrictions/Precautions   Weight Bearing Precautions Per Order No   Other Precautions Pain; Chair Alarm;Visual impairment  (agitation)   Home Living   Type of [address] [place] [address] Two level;Bed/bath upstairs  (full bath on first FL; 2 JULIO)   Bathroom Shower/Tub Walk-in shower Bathroom Toilet Standard   Bathroom Equipment Shower chair   Home Equipment Walker  (uses rollator at baseline; has 1 rollator on each [facility])   Prior Function   Level of [place] Independent with ADLs; Independent with functional mobility; Independent with IADLS   Lives With Daughter;Family  (lives in daughter's home; daughter home with pt 24/7)   Receives Help From Family   IADLs Family/Friend/Other provides transportation; Family/Friend/Other provides meals   Falls in the last 6 months 0   Vocational Retired   General   Family/Caregiver Present Yes  (2 daughters)   Cognition   Overall Cognitive Status Impaired   Arousal/Participation Alert   Orientation Level Oriented X4   Memory Decreased recall of biographical information   Following Commands Follows one step commands without difficulty   Comments at first pt resistant to performing ambulation however did agree with ancouragment  (emotional labile)   RLE Assessment   RLE Assessment WFL   LLE Assessment   LLE Assessment WFL   Vision-Basic Assessment   Current Vision Wears glasses all the time   Visual History Macular degeneration   Transfers   Sit to Stand 5  Supervision   Additional items Increased time required;Verbal cues;Armrests   Stand to Sit 5  Supervision   Additional items Increased time required;Verbal cues;Armrests   Additional Comments pt denied dizziness   Ambulation/Elevation   Gait pattern Antalgic; Improper Weight shift;Decreased foot clearance;Decreased heel strike; Short stride; Inconsistent maria eugenia   Gait Assistance   (SBA)   Additional items Verbal cues   Assistive Device Rolling walker   Distance 40 ft   Ambulation/Elevation Additional Comments pt reporting that hip pain causes antalgic gait pattern   Balance   Static Sitting Good   Dynamic Sitting Good   Static Standing Fair   Dynamic Standing Fair -   Ambulatory Fair -   Activity Tolerance   Activity Tolerance Patient limited by pain;Treatment limited secondary to agitation   Nurse Made Aware RN Larissa   Assessment   Prognosis Good   Assessment Pt is 81 y.o. female seen for PT evaluation s/p admit to [facility] on 7/11/2023 w/ Altered mental status, unspecified. PT consulted to assess pt's functional mobility and d/c needs. Order placed for PT eval and tx, w/ up and OOB as tolerated order. Pt agreeable to PT  session upon arrival, pt found seated OOB in chair. PTA, pt was independent w/ all functional mobility w/ rollator, ambulates household distances, has 2 JULIO, lives w/ family in 2 level home and retired. Pt to benefit from continued PT tx to address deficits and maximize level of functional independent mobility and consistency. From PT/mobility standpoint, recommendation at time of d/c would be home with home health rehabilitation pending progress in order to facilitate return to PLOF. Upon conclusion pt  seated in chair. Complexity: Comorbidities affecting pt's physical performance at time of assessment include: anxiety, depression and mild cognitive impairment and arthritis. Personal factors affecting pt at time of IE include: lives in 2 story house, ambulating w/ assistive device, stairs to enter home, decreased cognition, anxiety, visual impairments and depression. Please find objective findings from PT assessment regarding body systems outlined above with impairments and limitations including impaired balance, decreased endurance, gait deviations, pain, decreased safety awareness, impaired judgement and decreased cognition. Pt's clinical presentation is currently unstable/unpredictable seen in pt's presentation of pain, confusion, agitation and abnormal urine and hallucinations. The patient's AM-PAC Basic Mobility Inpatient Short Form Raw Score is 23. A Raw score of greater than 16 suggests the patient may benefit from discharge to home. Please also refer to the recommendation of the Physical Therapist for safe discharge planning. RN verbalized pt appropriate for session.    Barriers to Discharge None   Goals   Patient Goals to go to [place]   LTG Expiration Date 07/22/23   Long Term Goal #1 Pt will: Perform bed mobility tasks to modified I to improve ease of bed mobility. Perform transfers to modified I to improve ease of transfers. Perform ambulation with MI and RW for 250 feet to  increase Indep in home environment. Increase dynamic standing balance to F+ to decrease fall risk. Increase OOB activity tolerance to 10 minutes without s/s of exertion to decrease fall risk. Navigate up and down 2 steps with MI so patient can enter and exit home. Plan   Treatment/Interventions Functional transfer training;Elevations; Therapeutic exercise; Endurance training;Gait training;Bed mobility   PT Frequency 3-5x/wk   Recommendation   PT Discharge Recommendation Home with home health rehabilitation   Equipment Recommended   (pt owns 2 rollators)   AM-PAC Basic Mobility Inpatient   Turning in Flat Bed Without Bedrails 4   Lying on Back to Sitting on Edge of Flat Bed Without Bedrails 4   Moving Bed to Chair 4   Standing Up From Chair Using Arms 4   Walk in Room 4   Climb 3-5 Stairs With Railing 3   Basic Mobility Inpatient Raw Score 23   Basic Mobility Standardized Score 50.88   Highest Level Of Mobility   JH-HLM Goal 7: Walk 25 feet or more   JH-HLM Achieved 7: Walk 25 feet or more       Time In: 1045  Time Out: 1100  Total Evaluation Minutes: [address]valho, PT

## 2023-07-12 NOTE — PROGRESS NOTES
INTERNAL MEDICINE RESIDENCY PROGRESS NOTE     Name: Asa Hercules   Age & Sex: 81 y.o. female   MRN: [id number]  Unit/Bed#: CW2 210-01   Encounter: [id number]  Team: SOD Team B     PATIENT INFORMATION     Name: Asa Hercules   Age & Sex: 81 y.o. female   MRN: [id number]  Hospital Stay Days: 0    ASSESSMENT/PLAN     Principal Problem:    Altered mental status, unspecified  Active Problems:    Essential hypertension    GERD (gastroesophageal reflux disease)    Chronic idiopathic constipation    Anxiety and depression      Anxiety and depression  Assessment & Plan  The patient has been taking ativan 0.5 mg BID for 25-30 years per daughter. Her  passed away approximately 6 years ago and she deals with significant anxiety and depression. Plan:  -Continue ativan 0.5 mg  - Consider additional therapy for anxiety and depression as patient is not interested in therapy    Chronic idiopathic constipation  Assessment & Plan  She does not take colace or miralax at home, has bowel movements every 2-4 days. Plan:  - Bowel regimen in place as patient admits to having prolonged period without bowel movement    GERD (gastroesophageal reflux disease)  Assessment & Plan  Daughter states that the patient takes omeprazole at home    Plan:   - Will be on pantoprazole PO, instead of omeprazole due to formulary differences    Essential hypertension  Assessment & Plan  She has a history of hypertension and on admission 156/69. Plans:  - Cont norvasc 5 mg and lopressor 25 mg  - Could consider additional agent for SBP of 140s          * Altered mental status, unspecified  Assessment & Plan  78-year-old female presents via EMS with her daughters for evaluation of increased confusion, forgetfulness, decreased ADLs including dietary intake and personal hygiene. Symptoms have been occurring for approximately 1 week. About a month ago she had a UTI which had similar, milder symptoms. She has no discrete history of dementia. She lives with her daughter, Ada Crespo, who is present in the ED. Her daughter is feeling overwhelmed with redirecting and providing care for her mother at this point. In the ED, she told her daughters that she was seeing little people walking around. No known psychiatric history. No recent travel or sick contacts. Workup:  · TSH is decreased with a normal T4  · CMP and CBC unremarkable   · UA is not concerning for infection at this time  · B12 294   · Mag & Phos nl  · CTH identified decreased attenuation is noted in periventricular and subcortical white matter demonstrating an appearance that is statistically most likely to represent moderate microangiopathic change and ventricles and extra-axial csf spaces consistent with the degree of volume loss; no acute intracranial processes identified    Plan:  - Obtain urine toxicology  - Obtain methylmalonic acid  - Geriatrics consulted  - PT/OT to assess & provide recs re: d/c rehab vs home  - Continue holding home opioids in the setting of AMS; patient currently denies pain        Disposition: Pending PT/OT visit for recs concerning d/c rehab vs home, d/c ~24 hours    SUBJECTIVE     Patient seen and examined. No acute events overnight. Pt has no complaints this morning, only describes slight HA. Wishes to leave the hospital. Pt awake & alert, oriented x 2. OBJECTIVE     Vitals:    23 [id number] 23 2256 23 0810   BP: (!) 182/97  129/68 (!) 171/91   BP Location: Left arm  Left arm    Pulse: 95 95 77 88   Resp: 18  16    Temp: 97.9 °F (36.6 °C) 97.9 °F (36.6 °C) 98.1 °F (36.7 °C) 97.5 °F (36.4 °C)   TempSrc: Oral  Oral    SpO2: 95% 94% 95% 96%   Weight:  48.7 kg (107 lb 6.4 oz)        Temperature:   Temp (24hrs), Av.9 °F (36.6 °C), Min:97.5 °F (36.4 °C), Max:98.1 °F (36.7 °C)    Temperature: 97.5 °F (36.4 °C)  Intake & Output:  I/O       07/10 07 07 07 07 0713 0700    P. O.  120     Total Intake(mL/kg)  120 (2.5)     Urine (mL/kg/hr)  750 400 (1.8)    Total Output  750 400    Net  -630 -400               Weights:        Body mass index is 20.98 kg/m². Weight (last 2 days)     Date/Time Weight    07/11/23 19:36:51 48.7 (107.4)        Physical Exam  Vitals and nursing note reviewed. Constitutional:       General: She is not in acute distress. Appearance: Normal appearance. She is well-developed. She is not ill-appearing. HENT:      Head: Normocephalic and atraumatic. Eyes:      General: No visual field deficit. Cardiovascular:      Rate and Rhythm: Regular rhythm. Heart sounds: Normal heart sounds. Pulmonary:      Effort: Pulmonary effort is normal.      Breath sounds: Normal breath sounds. Abdominal:      General: There is no distension. Tenderness: There is no guarding. Musculoskeletal:      Cervical back: No rigidity. Right lower leg: No edema. Left lower leg: No edema. Skin:     General: Skin is warm and dry. Neurological:      Mental Status: She is alert. Cranial Nerves: No cranial nerve deficit, dysarthria or facial asymmetry. Sensory: No sensory deficit. Motor: No weakness. Coordination: Finger-Nose-Finger Test normal.      Deep Tendon Reflexes: Reflexes normal.   Psychiatric:         Mood and Affect: Mood normal.         Speech: Speech normal.         Behavior: Behavior normal. Behavior is cooperative. LABORATORY DATA     Labs: I have personally reviewed pertinent reports.   Results from last 7 days   Lab Units 07/12/23  0501 07/11/23  1431   WBC Thousand/uL 6.56 9.54   HEMOGLOBIN g/dL 13.1 13.2   HEMATOCRIT % 38.2 40.6   PLATELETS Thousands/uL 226 155   NEUTROS PCT %  --  78*   MONOS PCT %  --  6   EOS PCT %  --  1      Results from last 7 days   Lab Units 07/12/23  0501 07/11/23  1431   POTASSIUM mmol/L 3.8 4.0   CHLORIDE mmol/L 112* 110*   CO2 mmol/L 27 25   BUN mg/dL 10 12   CREATININE mg/dL 0.78 0.99   CALCIUM mg/dL 9.7 9.6 ALK PHOS U/L  --  89   ALT U/L  --  16   AST U/L  --  17     Results from last 7 days   Lab Units 07/11/23  1431   MAGNESIUM mg/dL 2.1     Results from last 7 days   Lab Units 07/11/23  1431   PHOSPHORUS mg/dL 4.1      Results from last 7 days   Lab Units 07/12/23  0501 07/11/23  1832   INR  1.05  --    PTT seconds  --  29               IMAGING & DIAGNOSTIC TESTING     Radiology Results: I have personally reviewed pertinent reports. CT head without contrast    Result Date: 7/11/2023  Impression: No acute intracranial abnormality. Workstation performed: [id number]     Other Diagnostic Testing: I have personally reviewed pertinent reports. ACTIVE MEDICATIONS     Current Facility-Administered Medications   Medication Dose Route Frequency   • Diclofenac Sodium (VOLTAREN) 1 % topical gel 2 g  2 g Topical 4x Daily   • enoxaparin (LOVENOX) subcutaneous injection 40 mg  40 mg Subcutaneous Daily   • lidocaine (LIDODERM) 5 % patch 1 patch  1 patch Topical Daily   • LORazepam (ATIVAN) tablet 0.5 mg  0.5 mg Oral BID   • metoprolol tartrate (LOPRESSOR) tablet 25 mg  25 mg Oral BID   • pantoprazole (PROTONIX) EC tablet 20 mg  20 mg Oral BID AC   • polyethylene glycol (MIRALAX) packet 17 g  17 g Oral Daily       VTE Pharmacologic Prophylaxis: Enoxaparin (Lovenox)  VTE Mechanical Prophylaxis: sequential compression device    Portions of the record may have been created with voice recognition software. Occasional wrong word or "sound a like" substitutions may have occurred due to the inherent limitations of voice recognition software.   Read the chart carefully and recognize, using context, where substitutions have occurred.  ==  Leonid Bowman, [facility]  Internal Medicine Residency PGY-1

## 2023-07-12 NOTE — OCCUPATIONAL THERAPY NOTE
Occupational Therapy Evaluation     Patient Name: Haroldo Husain  EFZMX'V Date: 7/12/2023  Problem List  Principal Problem:    Altered mental status, unspecified  Active Problems:    Essential hypertension    GERD (gastroesophageal reflux disease)    Chronic idiopathic constipation    Anxiety and depression    Past Medical History  Past Medical History:   Diagnosis Date    Allergic rhinitis     Anxiety     Diverticulitis of large intestine without perforation or abscess without bleeding     Dysthymic disorder     Gastro-esophageal reflux disease without esophagitis     HLD (hyperlipidemia)     Hypertension     Osteopenia     Palpitations     Paresthesia     Stroke [facility])      Past Surgical History  Past Surgical History:   Procedure Laterality Date    APPENDECTOMY      CHOLECYSTECTOMY      COLON SURGERY      FL INJECTION RIGHT HIP (NON ARTHROGRAM)  8/28/2019    HYSTERECTOMY               07/12/23 1108   OT Last Visit   OT Visit Date 07/12/23   Note Type   Note type Evaluation   Pain Assessment   Pain Assessment Tool 0-10   Pain Score 10 - Worst Possible Pain   Pain Location/Orientation Orientation: Right;Location: Hip   Pain Onset/Description Onset: Ongoing; Descriptor: Aching;Descriptor: Discomfort   Patient's Stated Pain Goal No pain   Hospital Pain Intervention(s) Repositioned; Ambulation/increased activity; Emotional support   Restrictions/Precautions   Weight Bearing Precautions Per Order No   Other Precautions Cognitive;Agitated; Chair Alarm;Multiple lines; Fall Risk;Pain   Home Living   Type of [address] [place] [address]  Two level  (2 JULIO; 1st floor bath; stairglide to 2nd floor)   Bathroom Shower/Tub Walk-in shower   Bathroom Toilet Standard   Bathroom Equipment Shower chair   Home Equipment Other (Comment)  (2 rollators)   Additional Comments Lives w/ dght who is home 24/7; 2 SH, 2 JULIO stairglide to 2nd floor; full bath on 1st   Prior Function   Level of [place] Independent with functional mobility; Independent with ADLs; Needs assistance with IADLS   Lives With Daughter;Family  (dght is home 24/7)   Receives Help From Family   IADLs Family/Friend/Other provides transportation; Family/Friend/Other provides meals; Family/Friend/Other provides medication management   Falls in the last 6 months 0   Vocational Retired   Comments (-)    Lifestyle   Autonomy I w/ ADLS, assist IADLS, Mod I w/ transfers and functional mobility PTA   Reciprocal Relationships Pt lives w/ her dght who is always home   Service to Others Retired   [address]   (2 dghts)   ADL   Eating Assistance 5  Supervision/Setup   Grooming Assistance 5  Supervision/Setup   UB Bathing Assistance 5  Supervision/Setup   LB Bathing Assistance 4  Minimal Assistance   [address] 5  Supervision/Setup   LB [address] 4  [address] 4  Minimal Assistance   Functional Deficit Steadying;Verbal cueing;Supervision/safety; Increased time to complete   Bed Mobility   Supine to Sit Unable to assess   Sit to Supine Unable to assess   Additional Comments pt seated up OOB in chair prior to and end of session   Transfers   Sit to Stand 5  Supervision   Additional items Increased time required;Verbal cues   Stand to Sit 5  Supervision   Additional items Increased time required;Verbal cues   Additional Comments use of RW   Functional Mobility   Functional Mobility 4  Minimal assistance   Additional Comments pt ambulated short household distance w/ CGA; use of RW   Additional items Rolling walker   Balance   Static Sitting Fair   Dynamic Sitting Fair -   Static Standing Fair -   Dynamic Standing Fair -   Ambulatory Fair -   Activity Tolerance   Activity Tolerance Patient limited by pain;Treatment limited secondary to agitation   Medical Staff Made Aware PT and CM   Nurse Made Aware yes   RUE Assessment   RUE Assessment WFL   LUE Assessment   LUE Assessment WFL   Hand Function   Gross Motor Coordination Functional   Fine Motor Coordination Functional   Vision-Basic Assessment   Current Vision Wears glasses all the time   Visual History Macular degeneration   Psychosocial   Psychosocial (WDL) X   Patient Behaviors/Mood Irritable;Labile   Cognition   Overall Cognitive Status Impaired   Arousal/Participation Responsive   Attention Difficulty attending to directions   Orientation Level Oriented X4   Memory Decreased recall of precautions   Following Commands Follows one step commands with increased time or repetition   Comments Pt found agitated in room w/ dghts present. Family and pt were arguing. Pt very irritable initially, reluctant to work w/ therapy. W/ time pt became more amenable to therapy and apologized. Pt is oriented but confused (stating family members recently passed away when it was really several years ago). Pt needs cues for safety. Assessment   Limitation Decreased ADL status; Decreased Safe judgement during ADL;Decreased cognition;Decreased endurance;Decreased self-care trans;Decreased high-level ADLs   Prognosis Fair   Assessment Pt is a 82 y/o female seen for OT eval s/p adm to [facility] w/ altered mental status. Pt  has a past medical history of Allergic rhinitis, Anxiety, Diverticulitis of large intestine without perforation or abscess without bleeding, Dysthymic disorder, Gastro-esophageal reflux disease without esophagitis, HLD (hyperlipidemia), Hypertension, Osteopenia, Palpitations, Paresthesia, and Stroke ([address]). Pt with active OT orders and up and OOB as tolerated orders. Pt lives with her dght in 2 SH, 2 JULIO w/ stairglide. Pt was I w/  ADLS and has assist for IADLS, does not drive , & required use of DME PTA including a rollator. Pt is currently demonstrating the following occupational deficits: S UB ADLS, CGA LB ADLS, S transfers and CGA functional mobility w/ RW.  These deficits that are impacting pt's baseline areas of occupation are a result of the following impairments: pain, endurance, activity tolerance, functional mobility, forward functional reach, balance, functional standing tolerance, decreased I w/ ADLS/IADLS, cognitive impairments, decreased safety awareness and decreased insight into deficits. The following Occupational Performance Areas to address include: bathing/shower, toilet hygiene, dressing, medication management, socialization, health maintenance, functional mobility and clothing management. Recommend home OT upon D/C. Pt to continue to benefit from acute immediate OT services to address the following goals 2-3x/week to  w/in 10-14 days:   Goals   Patient Goals to be left alone   LTG Time Frame 10-   Long Term Goal #1 see below listed goals   Plan   Treatment Interventions ADL retraining;Functional transfer training;UE strengthening/ROM; Endurance training;Cognitive reorientation;Patient/family training;Equipment evaluation/education; Compensatory technique education;Continued evaluation; Energy conservation; Activityengagement   Goal Expiration Date 23   OT Frequency 2-3x/wk   Recommendation   OT Discharge Recommendation Home with home health rehabilitation   Additional Comments  The patient's raw score on the AM-PAC Daily Activity Inpatient Short Form is 21. A raw score of greater than or equal to 19 suggests the patient may benefit from discharge to home. Please refer to the recommendation of the Occupational Therapist for safe discharge planning   Additional Comments 2 Pt seen as a co-session due to the patient's co-morbidities, clinically unstable presentation, and present impairments which are a regression from the patient's baseline.    [facility] Daily Activity Inpatient   Lower Body Dressing 3   Bathing 3   Toileting 3   Upper Body Dressing 4   Grooming 4   Eating 4   Daily Activity Raw Score 21   Daily Activity Standardized Score (Calc for Raw Score >=11) 44.27   AM-PAC Applied Cognition Inpatient   Following a Speech/Presentation 2   Understanding Ordinary Conversation 2   Taking Medications 2   Remembering Where Things Are Placed or Put Away 2   Remembering List of 4-5 Errands 2   Taking Care of Complicated Tasks 1   Applied Cognition Raw Score 11   Applied Cognition Standardized Score 27.03   End of Consult   Education Provided Yes;Family or social support of family present for education by provider   Patient Position at End of Consult Bedside chair;Bed/Chair alarm activated; All needs within reach   Nurse Communication Nurse aware of consult      GOALS    1) Pt will improve activity tolerance to G for 30 min txment sessions for increase engagement in functional tasks  2) Pt will complete UB/LB dressing/self care w/ mod I using adaptive device and DME as needed  3) Pt will complete bathing w/ Mod I w/ use of AE and DME as needed  4) Pt will complete toileting w/ mod I w/ G hygiene/thoroughness using DME as needed  5) Pt will improve functional transfers to Mod I on/off all surfaces using DME as needed w/ G balance/safety   6) Pt will improve functional mobility during ADL/IADL/leisure tasks to Mod I using DME as needed w/ G balance/safety   7) Pt will be attentive 100% of the time during ongoing cognitive assessment w/ G participation to assist w/ safe d/c planning/recommendations  8) Pt will demonstrate G carryover of pt/caregiver education and training as appropriate w/o cues w/ good tolerance to increase safety during functional tasks  9) Pt will demonstrate 100% carryover of energy conservation techniques t/o functional I/ADL/leisure tasks w/o cues s/p skilled education to increase endurance during functional tasks     Juan Ludwig MS, OTR/L

## 2023-07-12 NOTE — PLAN OF CARE
Problem: OCCUPATIONAL THERAPY ADULT  Goal: Performs self-care activities at highest level of function for planned discharge setting. See evaluation for individualized goals. Description: Treatment Interventions: ADL retraining, Functional transfer training, UE strengthening/ROM, Endurance training, Cognitive reorientation, Patient/family training, Equipment evaluation/education, Compensatory technique education, Continued evaluation, Energy conservation, Activityengagement          See flowsheet documentation for full assessment, interventions and recommendations. Note: Limitation: Decreased ADL status, Decreased Safe judgement during ADL, Decreased cognition, Decreased endurance, Decreased self-care trans, Decreased high-level ADLs  Prognosis: Fair  Assessment: Pt is a 82 y/o female seen for OT eval s/p adm to [facility] w/ altered mental status. Pt  has a past medical history of Allergic rhinitis, Anxiety, Diverticulitis of large intestine without perforation or abscess without bleeding, Dysthymic disorder, Gastro-esophageal reflux disease without esophagitis, HLD (hyperlipidemia), Hypertension, Osteopenia, Palpitations, Paresthesia, and Stroke ([address] Pt with active OT orders and up and OOB as tolerated orders. Pt lives with her dght in 2 SH, 2 JULIO w/ stairglide. Pt was I w/  ADLS and has assist for IADLS, does not drive , & required use of DME PTA including a rollator. Pt is currently demonstrating the following occupational deficits: S UB ADLS, CGA LB ADLS, S transfers and CGA functional mobility w/ RW. These deficits that are impacting pt's baseline areas of occupation are a result of the following impairments: pain, endurance, activity tolerance, functional mobility, forward functional reach, balance, functional standing tolerance, decreased I w/ ADLS/IADLS, cognitive impairments, decreased safety awareness and decreased insight into deficits. The following Occupational Performance Areas to address include: bathing/shower, toilet hygiene, dressing, medication management, socialization, health maintenance, functional mobility and clothing management. Recommend home OT upon D/C.  Pt to continue to benefit from acute immediate OT services to address the following goals 2-3x/week to  w/in 10-14 days:     OT Discharge Recommendation: Home with home health rehabilitation     Aziza Reeves MS, OTR/L

## 2023-07-12 NOTE — CASE MANAGEMENT
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   • Mild cognitive impairment 2021   • Word finding difficulty 2021   • Perianal lesion 2021   • Chronic idiopathic constipation 10/28/2020   • Dizziness 2019   • Chest tightness 2019   • Essential hypertension 2019   • GERD (gastroesophageal reflux disease) 2019      LOS (days): 0  Geometric Mean LOS (GMLOS) (days):   Days to GMLOS:     OBJECTIVE:              Current admission status: Observation       Preferred Pharmacy:   [place] [address]  Phone: [phone] Fax: [phone]    Primary Care Provider: Joanne Rodríguez MD    Primary Insurance: MEDICARE  [facility]    ASSESSMENT:  [address] Representative - Daughter   Primary Phone: [phone] (Home)  Mobile Phone: [phone]                         Readmission Root Cause  30 Day Readmission: No    Patient Information  Admitted from[de-identified] Home  Mental Status: Confused  Assessment information provided by[de-identified] Daughter  Support Systems: Daughter  [place] of Residence: [place]  What city do you live in?: [place] entry access options.  Select all that apply.: Stairs  Number of steps to enter home.: 2  Do the steps have railings?: No  Type of Current Residence: 2 story home  Upon entering residence, is there a bedroom on the main floor (no further steps)?: No  A bedroom is located on the following floor levels of residence (select all that apply):: 2nd Floor  Upon entering residence, is there a bathroom on the main floor (no further steps)?: Yes  Number of steps to 2nd floor from main floor: One Flight  In the last 12 months, was there a time when you were not able to pay the mortgage or rent on time?: No  In the last 12 months, how many places have you lived?: 1  Homeless/housing insecurity resource given?: N/A  Living Arrangements: Lives w/ Daughter  Is patient a ?: No    Activities of Daily Living Prior to Admission  Functional Status: Assistance  Completes ADLs independently?: No  Level of ADL dependence: Assistance  Does patient use assisted devices?: Yes  Assisted Devices (DME) used: Stair Chair/Glide, Rollator, Shower Chair  Does patient currently own DME?: Yes  What DME does the patient currently own?: Stair Chair/Glide, Rollator, Shower Chair         Patient Information Continued  Income Source: Pension/senior care  Does patient have prescription coverage?: Yes (Maryanne Grace)  Within the past 12 months, you worried that your food would run out before you got the money to buy more.: Never true  Within the past 12 months, the food you bought just didn't last and you didn't have money to get more.: Never true  Food insecurity resource given?: N/A  Does patient receive dialysis treatments?: No  Does patient have a history of substance abuse?: No  Does patient have a history of Mental Health Diagnosis?: Yes (anxiety/depression  Paliative and PMD managing)  Is patient receiving treatment for mental health?: No. Patient declined treatment information.   Has patient received inpatient treatment related to mental health in the last 2 years?: No         Means of Transportation  Means of [facility] to [facility][de-identified] Family transport  In the past 12 months, has lack of transportation kept you from medical appointments or from getting medications?: No  In the past 12 months, has lack of transportation kept you from meetings, work, or from getting things needed for daily living?: No        DISCHARGE DETAILS:    Discharge planning discussed with[de-identified] johan Prieto and Marquez Gallegos  Freedom of Choice: Yes     CM contacted family/caregiver?: Yes  Were Treatment Team discharge recommendations reviewed with patient/caregiver?: Yes  Did patient/caregiver verbalize understanding of patient care needs?: Yes  Were patient/caregiver advised of the risks associated with not following Treatment Team discharge recommendations?: Yes    Contacts  Patient Contacts: Amanda  Relationship to Patient[de-identified] Family  Contact Method: In Person  Reason/Outcome: Continuity of Care, Emergency Contact, Discharge [facility]         Is the patient interested in [facility] [address] at discharge?: Yes  [facility] requested[de-identified] [facility] Work, Occupational Therapy, Physical [facility] Provider[de-identified] PCP  Home Health Services Needed[de-identified] Evaluate Functional Status and Safety, Gait/ADL Training, Strengthening/Theraputic Exercises to Improve Function  Homebound Criteria Met[de-identified] Requires the Assistance of Another Person for Safe Ambulation or to Leave the Home, Uses an Assist Device (i.e. cane, walker, etc)    DME Referral Provided  Referral made for DME?: No    Other Referral/Resources/Interventions Provided:  Interventions: C    Would you like to participate in our [address] service program?  : Yes       Additional Comments: Johan Prieto and Marquez Gallegos were at the bedside. Trey Hawk says she is the primay caregiver for the patient. Trey Hawk states she and her 2 sisters are the decision makers for patient.   Trey Hawk states patient [facility] says patient is not managing her ADLS well and she has trouble helping the patient bathe. Family is in agreement to [address] at [address] and prefers Karlie Patterson. Referral was sent via [facility] for PT/OT HHA and SW and awaiting determination. Aura Avendaño states she has contacted the Office of Aging to find out if patient qualifies for Waiver. Aura Avendaño says patient does have some savings. Family was encouraged to consider private pay to assist patient with ADLS         CM reviewed d/c planning process including the following: identifying help at home, patient preference for d/c planning needs, Discharge Lounge, [facility] Meds to Bed program, availability of treatment team to discuss questions or concerns patient and/or family may have regarding understanding medications and recognizing signs and symptoms once discharged. CM also encouraged patient to follow up with all recommended appointments after discharge. Patient advised of importance for patient and family to participate in managing patient’s medical well being. 3pm  [facility] said the patient does not meet clincal criteria for HHC at this time. Daughter Aura Avendaño was made aware. HHC suggested via [facility] out patient at home therapy  and SOD B Dr Wicho Burroughs was made aware via TT.   CM to be avilable

## 2023-07-13 VITALS
DIASTOLIC BLOOD PRESSURE: 73 MMHG | BODY MASS INDEX: 20.98 KG/M2 | RESPIRATION RATE: 17 BRPM | SYSTOLIC BLOOD PRESSURE: 143 MMHG | HEART RATE: 76 BPM | OXYGEN SATURATION: 96 % | TEMPERATURE: 97.4 F | WEIGHT: 107.4 LBS

## 2023-07-13 LAB
AMPHETAMINES UR QL SCN: NEGATIVE NG/ML
BARBITURATES UR QL SCN: NEGATIVE NG/ML
BENZODIAZ UR QL: NEGATIVE NG/ML
BZE UR QL: NEGATIVE NG/ML
CANNABINOIDS UR QL SCN: NEGATIVE NG/ML
METHADONE UR QL SCN: NEGATIVE NG/ML
OPIATES UR QL: NEGATIVE NG/ML
PCP UR QL: NEGATIVE NG/ML
PROPOXYPH UR QL SCN: NEGATIVE NG/ML

## 2023-07-13 PROCEDURE — 99232 SBSQ HOSP IP/OBS MODERATE 35: CPT | Performed by: NURSE PRACTITIONER

## 2023-07-13 PROCEDURE — 99238 HOSP IP/OBS DSCHRG MGMT 30/<: CPT | Performed by: INTERNAL MEDICINE

## 2023-07-13 RX ORDER — AMLODIPINE BESYLATE 5 MG/1
5 TABLET ORAL ONCE
Status: DISCONTINUED | OUTPATIENT
Start: 2023-07-13 | End: 2023-07-13

## 2023-07-13 RX ORDER — POLYETHYLENE GLYCOL 3350 17 G/17G
17 POWDER, FOR SOLUTION ORAL AS NEEDED
Qty: 17 G | Refills: 0 | Status: SHIPPED | OUTPATIENT
Start: 2023-07-13

## 2023-07-13 RX ORDER — AMLODIPINE BESYLATE 10 MG/1
10 TABLET ORAL DAILY
Qty: 90 TABLET | Refills: 0 | Status: SHIPPED | OUTPATIENT
Start: 2023-07-14 | End: 2023-10-12

## 2023-07-13 RX ORDER — AMLODIPINE BESYLATE 5 MG/1
5 TABLET ORAL ONCE
Status: COMPLETED | OUTPATIENT
Start: 2023-07-13 | End: 2023-07-13

## 2023-07-13 RX ORDER — AMLODIPINE BESYLATE 5 MG/1
5 TABLET ORAL DAILY
Status: DISCONTINUED | OUTPATIENT
Start: 2023-07-13 | End: 2023-07-13

## 2023-07-13 RX ORDER — AMLODIPINE BESYLATE 10 MG/1
10 TABLET ORAL DAILY
Status: DISCONTINUED | OUTPATIENT
Start: 2023-07-14 | End: 2023-07-13 | Stop reason: HOSPADM

## 2023-07-13 RX ORDER — AMLODIPINE BESYLATE 10 MG/1
10 TABLET ORAL DAILY
Status: DISCONTINUED | OUTPATIENT
Start: 2023-07-13 | End: 2023-07-13

## 2023-07-13 RX ORDER — METHOCARBAMOL 500 MG/1
250 TABLET, FILM COATED ORAL EVERY 8 HOURS SCHEDULED
Qty: 8 TABLET | Refills: 0 | Status: SHIPPED | OUTPATIENT
Start: 2023-07-13 | End: 2023-07-24 | Stop reason: SDUPTHER

## 2023-07-13 RX ORDER — AMLODIPINE BESYLATE 10 MG/1
10 TABLET ORAL ONCE
Status: DISCONTINUED | OUTPATIENT
Start: 2023-07-14 | End: 2023-07-13

## 2023-07-13 RX ADMIN — AMLODIPINE BESYLATE 5 MG: 5 TABLET ORAL at 08:14

## 2023-07-13 RX ADMIN — LORAZEPAM 0.5 MG: 0.5 TABLET ORAL at 08:03

## 2023-07-13 RX ADMIN — DICLOFENAC SODIUM TOPICAL GEL, 1%, 2 G: 10 GEL TOPICAL at 11:31

## 2023-07-13 RX ADMIN — LIDOCAINE 5% 1 PATCH: 700 PATCH TOPICAL at 08:03

## 2023-07-13 RX ADMIN — METOPROLOL TARTRATE 25 MG: 25 TABLET, FILM COATED ORAL at 08:03

## 2023-07-13 RX ADMIN — ENOXAPARIN SODIUM 40 MG: 40 INJECTION SUBCUTANEOUS at 08:02

## 2023-07-13 RX ADMIN — ACETAMINOPHEN 975 MG: 325 TABLET, FILM COATED ORAL at 06:06

## 2023-07-13 RX ADMIN — METHOCARBAMOL 250 MG: 500 TABLET ORAL at 06:06

## 2023-07-13 RX ADMIN — PANTOPRAZOLE SODIUM 20 MG: 20 TABLET, DELAYED RELEASE ORAL at 06:06

## 2023-07-13 RX ADMIN — POLYETHYLENE GLYCOL 3350 17 G: 17 POWDER, FOR SOLUTION ORAL at 08:03

## 2023-07-13 RX ADMIN — AMLODIPINE BESYLATE 5 MG: 5 TABLET ORAL at 11:31

## 2023-07-13 NOTE — DISCHARGE INSTR - AVS FIRST PAGE
Stop taking oxycodone  Continue taking scheduled Tylenol and Robaxin as directed  Take MiraLAX daily to prevent constipation  Continue using lidocaine patch and Voltaren gel for your osteoarthritis  Follow-up with your PCP and palliative care doctor to better control your pain

## 2023-07-13 NOTE — PLAN OF CARE
Problem: PAIN - ADULT  Goal: Verbalizes/displays adequate comfort level or baseline comfort level  Description: Interventions:  - Encourage patient to monitor pain and request assistance  - Assess pain using appropriate pain scale  - Administer analgesics based on type and severity of pain and evaluate response  - Implement non-pharmacological measures as appropriate and evaluate response  - Consider cultural and social influences on pain and pain management  - Notify physician/advanced practitioner if interventions unsuccessful or patient reports new pain  Outcome: Progressing     Problem: INFECTION - ADULT  Goal: Absence or prevention of progression during hospitalization  Description: INTERVENTIONS:  - Assess and monitor for signs and symptoms of infection  - Monitor lab/diagnostic results  - Monitor all insertion sites, i.e. indwelling lines, tubes, and drains  - Monitor endotracheal if appropriate and nasal secretions for changes in amount and color  - [place] appropriate cooling/warming therapies per order  - Administer medications as ordered  - Instruct and encourage patient and family to use good hand hygiene technique  - Identify and instruct in appropriate isolation precautions for identified infection/condition  Outcome: Progressing  Goal: Absence of fever/infection during neutropenic period  Description: INTERVENTIONS:  - Monitor WBC    Outcome: Progressing     Problem: SAFETY ADULT  Goal: Patient will remain free of falls  Description: INTERVENTIONS:  - Educate patient/family on patient safety including physical limitations  - Instruct patient to call for assistance with activity   - Consult OT/PT to assist with strengthening/mobility   - Keep Call bell within reach  - Keep bed low and locked with side rails adjusted as appropriate  - Keep care items and personal belongings within reach  - Initiate and maintain comfort rounds  - Make Fall Risk Sign visible to staff  - Apply yellow socks and bracelet for high fall risk patients  - Consider moving patient to room near nurses station  Outcome: Progressing  Goal: Maintain or return to baseline ADL function  Description: INTERVENTIONS:  -  Assess patient's ability to carry out ADLs; assess patient's baseline for ADL function and identify physical deficits which impact ability to perform ADLs (bathing, care of mouth/teeth, toileting, grooming, dressing, etc.)  - Assess/evaluate cause of self-care deficits   - Assess range of motion  - Assess patient's mobility; develop plan if impaired  - Assess patient's need for assistive devices and provide as appropriate  - Encourage maximum independence but intervene and supervise when necessary  - Involve family in performance of ADLs  - Assess for home care needs following discharge   - Consider OT consult to assist with ADL evaluation and planning for discharge  - Provide patient education as appropriate  Outcome: Progressing  Goal: Maintains/Returns to pre admission functional level  Description: INTERVENTIONS:  - Perform BMAT or MOVE assessment daily.   - Set and communicate daily mobility goal to care team and patient/family/caregiver.    - Collaborate with rehabilitation services on mobility goals if consulted  - Out of bed for toileting  - Record patient progress and toleration of activity level   Outcome: Progressing     Problem: DISCHARGE PLANNING  Goal: Discharge to home or other facility with appropriate resources  Description: INTERVENTIONS:  - Identify barriers to discharge w/patient and caregiver  - Arrange for needed discharge resources and transportation as appropriate  - Identify discharge learning needs (meds, wound care, etc.)  - Arrange for interpretive services to assist at discharge as needed  - Refer to Case Management Department for coordinating discharge planning if the patient needs post-hospital services based on physician/advanced practitioner order or complex needs related to functional status, cognitive ability, or social support system  Outcome: Progressing     Problem: Knowledge Deficit  Goal: Patient/family/caregiver demonstrates understanding of disease process, treatment plan, medications, and discharge instructions  Description: Complete learning assessment and assess knowledge base. Interventions:  - Provide teaching at level of understanding  - Provide teaching via preferred learning methods  Outcome: Progressing     Problem: MOBILITY - ADULT  Goal: Maintain or return to baseline ADL function  Description: INTERVENTIONS:  -  Assess patient's ability to carry out ADLs; assess patient's baseline for ADL function and identify physical deficits which impact ability to perform ADLs (bathing, care of mouth/teeth, toileting, grooming, dressing, etc.)  - Assess/evaluate cause of self-care deficits   - Assess range of motion  - Assess patient's mobility; develop plan if impaired  - Assess patient's need for assistive devices and provide as appropriate  - Encourage maximum independence but intervene and supervise when necessary  - Involve family in performance of ADLs  - Assess for home care needs following discharge   - Consider OT consult to assist with ADL evaluation and planning for discharge  - Provide patient education as appropriate  Outcome: Progressing  Goal: Maintains/Returns to pre admission functional level  Description: INTERVENTIONS:  - Perform BMAT or MOVE assessment daily.   - Set and communicate daily mobility goal to care team and patient/family/caregiver.    - Collaborate with rehabilitation services on mobility goals if consulted  - Out of bed for toileting  - Record patient progress and toleration of activity level   Outcome: Progressing     Problem: Prexisting or High Potential for Compromised Skin Integrity  Goal: Skin integrity is maintained or improved  Description: INTERVENTIONS:  - Identify patients at risk for skin breakdown  - Assess and monitor skin integrity  - Assess and monitor nutrition and hydration status  - Monitor labs   - Assess for incontinence   - Turn and reposition patient  - Assist with mobility/ambulation  - Relieve pressure over bony prominences  - Avoid friction and shearing  - Provide appropriate hygiene as needed including keeping skin clean and dry  - Evaluate need for skin moisturizer/barrier cream  - Collaborate with interdisciplinary team   - Patient/family teaching  - Consider wound care consult   Outcome: Progressing

## 2023-07-13 NOTE — CASE MANAGEMENT
Case Management Discharge Planning Note    Patient name Monroe Johnston  Location [facility] 931/[facility] 894-40 MRN [id number]  : 1935 Date 2023       Current Admission Date: 2023  Current Admission Diagnosis:Altered mental status, unspecified   Patient Active Problem List    Diagnosis Date Noted   • Altered mental status, unspecified 2023   • Ambulatory dysfunction 2023   • Anxiety and depression 06/10/2022   • Vitamin B12 deficiency 2022   • Mild protein-calorie malnutrition ([address]) 2021   • Diverticulitis large intestine w/o perforation or abscess w/o bleeding 11/10/2021   • 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   • Mild cognitive impairment 2021   • Word finding difficulty 2021   • Perianal lesion 2021   • Chronic idiopathic constipation 10/28/2020   • Dizziness 2019   • Chest tightness 2019   • Essential hypertension 2019   • GERD (gastroesophageal reflux disease) 2019      LOS (days): 1  Geometric Mean LOS (GMLOS) (days): 2.50  Days to GMLOS:1.6     OBJECTIVE:  Risk of Unplanned Readmission Score: 11.76         Current admission status: Inpatient   Preferred Pharmacy:   [address], [address] [place] [address]  Phone: [phone] Fax: [phone]    Primary Care Provider: Jeanette Jones MD    Primary Insurance: MEDICARE  Secondary Insurance: Leonel Izquierdo DETAILS:      [facility] Agency Name[de-identified] [facility] External Referral Reason (only applicable if external HHA name selected):  ([facility] did not accept pt)  Supporting Clincal Findings[de-identified] Fatigues Easliy in [employer], Limited Endurance         Other Referral/Resources/Interventions Provided:  Interventions: [facility]  Referral Comments: [facility]         Treatment Team Recommendation: Home with [address]  Discharge Destination Plan[de-identified] Home with [address]DESTINY at Discharge : Family           Additional Comments: CM met with pt's daughter to review [address] options. Pt's daughter agreeable to [address] [place] [address]. CM reserved in [facility] and made provider aware.

## 2023-07-13 NOTE — PROGRESS NOTES
Progress Note - Dian Alexandre 81 y.o. female MRN: [id number]    Unit/Bed#: [facility] 931-01 Encounter: [id number]      Assessment/Plan:  Altered mental status/ Encephalopathy  Improving, no noted hallucinations  Increased agitation/ hallucinations on admission  Alert and oriented x 2 at present, review of labs 7/12/23 wnl  Multifactorial risk factors: medications, hx of UTI, constipation, decrease oral intake  Medications: cannot exclude increased dose of oxycodone to 10 mg po prn as contributing  Constipation: at risk secondary to opioid use, now resolved last BM 7/12/23  Decreased oral intake: per daughter, doing well at present   Underlying cognitive impairment, places patient at high risk for delirium     Continue to monitor/ maintain delirium precautions:  Provide frequent redirection, reorientation, distraction techniques  Avoid deliriogenic medications such as tramadol, benzodiazepines, anticholinergics,  Benadryl  Treat pain, See geriatric pain protocol  Monitor for constipation and urinary retention  Encourage early and frequent moblization, OOB  Encourage Hydration/ Nutrition  Implement sleep hygiene, limit night time interuptions, group activities     Avoid physical restraints  Use chemical restraint only when other efforts have failed, recommend zyprexa 2.5mg IM q8h prn  QTc 402 (7/11/23)     Chronic pain  Follows with palliative as outpatient for symptom management  Due to increased confusion with oxycodone 10 mg recommend:  Geriatric pain protocol:  Scheduled acetaminophen 975 mg po Q 8 goal  Scheduled robaxin 250 mg po TID for pain  Monitor for constipation  Lidoderm patch    Doing well with above regimen     If further pain mediation needed recommend low dose prn oxycodone:  Oxycodone 2.5 mg po Q 4 prn moderate pain  Oxycodone 5 mg po Q 4 prn severe pain     Also recommend adjunctive medication: cymbalta 30 mg po daily, additionally would help with chronic anxiety     Constipation  Chronic  Pt refusing medications, colace and miralax  Prefers prune juice  Last BM  (7/12/23)     Frailty  Clinical Frail Scale: 7- severely frail  Completely dependent for personal care  Per daughter pt has decreased appetitite   Weight 107 (7/11/23)  Reflects 6 pound weight gain since Jaunary  Bmp  Wnl, Albumin 3.8 (7/11/23), maintain albumin in diet  PT/OT  Continue supportive care     Cognitive impairment  Per hx  At risk secondary to hx of stroke/TIA, anixety  TSH 0.188 (7/11/23)  Vitamin B 12: 293 (7/11/23), recommend vitamin B 12 supplementation: 1000 mcg po daily, goal level greater than 400  CT head (7/11/23): moderate microangiopathic changes I have personally reviewed the images in PACs  Has supportive daughter, 24/7 oversight     Agitation/ depression  Chronic over 40 years, with chronic use of ativan  Do not recommend abrupt withdrawal of benzodiazepines, recommend continue ativan 0.5 mg po BID  Daughter feels patient is highly anxious and would benefit from medication  Recommend start of Cymbalta 30 mg po daily, secondary to will help with depression and pain  Monitor sodium 140- (7/12/23) due to potential side effect of hyponatremia  monitor QTc 402 (7/11/23) Qtc prolonging medication     Ambulatory dysfunction  At high risk for falls secondary to severe frailty, pain, medications, underlying cognitive impairment, delirium, hospitalization  Fall precautions  PT/OT  Rehab post hospitalization for optmization    Subjective:   Upon exam pt is oob in recliner chair. She is alert and oriented x 2, periods of confusion, no hallucinations noted. She slept well. Rounded with nursing      Objective:     Vitals: Blood pressure (!) 193/98, pulse 75, temperature (!) 97.4 °F (36.3 °C), resp. rate 17, weight 48.7 kg (107 lb 6.4 oz), SpO2 97 %. ,Body mass index is 20.98 kg/m².       Intake/Output Summary (Last 24 hours) at 7/13/2023 1055  Last data filed at 7/13/2023 0953  Gross per 24 hour   Intake 720 ml   Output 200 ml   Net 520 ml Physical Exam:   General : NAD  HEENT : MMM  Heart : Normal rate, no murmur rub or gallop  Lungs : CTA no wheezes, rales or rhonchi  Abdomen : Soft, NT/ND, BS auscultated in all 4 quads. Ext :  no edema  Skin : Pink, warm, dry, age appropriate turgor and mobility  Neuro : Nonfocal  Psych : Alert and O x 2      Invasive Devices     Peripheral Intravenous Line  Duration           Peripheral IV 07/11/23 Right Antecubital 1 day                Lab, Imaging and other studies: I have personally reviewed pertinent reports.     VTE Pharmacologic Prophylaxis: Sequential compression device (Venodyne)   VTE Mechanical Prophylaxis: sequential compression device

## 2023-07-13 NOTE — CASE MANAGEMENT
Case Management Discharge Planning Note    Patient name Asa Hercules  [facility]N [id number]  : 1935 Date 2023       Current Admission Date: 2023  Current Admission Diagnosis:Altered mental status, unspecified   Patient Active Problem List    Diagnosis Date Noted   • Altered mental status, unspecified 2023   • Ambulatory dysfunction 2023   • Anxiety and depression 06/10/2022   • Vitamin B12 deficiency 2022   • Mild protein-calorie malnutrition ([address]) 2021   • Diverticulitis large intestine w/o perforation or abscess w/o bleeding 11/10/2021   • 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   • Mild cognitive impairment 2021   • Word finding difficulty 2021   • Perianal lesion 2021   • Chronic idiopathic constipation 10/28/2020   • Dizziness 2019   • Chest tightness 2019   • Essential hypertension 2019   • GERD (gastroesophageal reflux disease) 2019      LOS (days): 1  Geometric Mean LOS (GMLOS) (days): 2.50  Days to GMLOS:1.7     OBJECTIVE:  Risk of Unplanned Readmission Score: 11.47         Current admission status: Inpatient   Preferred Pharmacy:   [address], [address] [place] [id number]  Phone: [phone] Fax: [phone]    Primary Care Provider: Merlin Mills MD    Primary Insurance: MEDICARE  Secondary Insurance: Lei Bong    DISCHARGE DETAILS:          Additional Comments: Per provider, pt is clear for discharge today. CM called pt's daughter to re-review PT/OT rec for [address] and asked about expanding referral to find another [address] agency. Pt's daughter agreeable. CM re-opened referral and added more [address] agencies to review pt. CM to provide list to pt's daughter once agencies have reviewed.  CM updated provider via TT.

## 2023-07-13 NOTE — DISCHARGE SUMMARY
INTERNAL MEDICINE RESIDENCY DISCHARGE SUMMARY     Janet West   81 y.o. female  MRN: [id number]  Room/Bed: [facility] 931/[facility] 931-01     [address] [address]   Encounter: [id number]    Principal Problem:    Altered mental status, unspecified  Active Problems:    Essential hypertension    GERD (gastroesophageal reflux disease)    Chronic idiopathic constipation    Anxiety and depression      * Altered mental status, unspecified  Assessment & Plan  46-year-old female presents via EMS with her daughters for evaluation of increased confusion, forgetfulness, decreased ADLs including dietary intake and personal hygiene. Symptoms have been occurring for approximately 1 week. About a month ago she had a UTI which had similar, milder symptoms. She has no discrete history of dementia. She lives with her daughter, Inez Peña, who is present in the ED. Her daughter is feeling overwhelmed with redirecting and providing care for her mother at this point. In the ED, she told her daughters that she was seeing little people walking around. No known psychiatric history. No recent travel or sick contacts.     Workup:  · TSH is decreased with a normal T4  · CMP and CBC unremarkable   · UA is not concerning for infection at this time  · B12 294   · Mag & Phos nl  · Urine tox neg  · PT/OT D/c HHC  · Geriatrics consult complete  · CTH identified decreased attenuation is noted in periventricular and subcortical white matter demonstrating an appearance that is statistically most likely to represent moderate microangiopathic change and ventricles and extra-axial csf spaces consistent with the degree of volume loss; no acute intracranial processes identified    Plan:  - Patient is stable for discharge home on 7/13/2023 with home health care  - Follow-up methylmalonic acid; consider starting vitamin B12 in the outpatient setting  - Continue holding home opioids in the setting of AMS, as this is presumably the most likely etiology of clinical presentation  - Patient currently denies pain, agree w/ recommended geriatrics pain protocol:  · acetaminophen 975 mg po Q 8 goal  · robaxin 250 mg po TID for pain  · Lidoderm patch      Anxiety and depression  Assessment & Plan  The patient has been taking ativan 0.5 mg BID for 25-30 years per daughter. Her  passed away approximately 6 years ago and she deals with significant anxiety and depression. Plan:  -Continue ativan 0.5 mg  - Consider additional therapy for anxiety and depression as patient is not interested in therapy    Chronic idiopathic constipation  Assessment & Plan  She does not take colace or miralax at home, has bowel movements every 2-4 days. Plan:  - Bowel regimen in place as patient admits to having prolonged period without bowel movement    GERD (gastroesophageal reflux disease)  Assessment & Plan  Daughter states that the patient takes omeprazole at home    Plan:   - Will be on pantoprazole PO, instead of omeprazole due to formulary differences    Essential hypertension  Assessment & Plan  She has a history of hypertension and on admission 156/69. Plans:  -Increase Norvasc to 10 mg daily; continue Lopressor 20 mg daily            [address] [place] [address]     21-year-old female presents via EMS with her daughters for evaluation of increased confusion, forgetfulness, decreased ADLs including dietary intake and personal hygiene. Symptoms have been occurring for approximately 1 week. About a month ago she had a UTI which had similar, milder symptoms. She has no discrete history of dementia. She lives with her daughter, Ritu Jerez, who is present in the ED. Her daughter is feeling overwhelmed with redirecting and providing care for her mother at this point. In the ED, she told her daughters that she was seeing little people walking around. No known psychiatric history. No recent travel or sick contacts.  TSH is decreased with a normal T4, otherwise, CMP (GFR of 51)  and CBC are relatively unremarkable. UA is not concerning for infection at this time. Patient's altered mental status was likely secondary to opioid management in the outpatient setting. Her opioids were held on admission and her pain was controlled. Geriatrics was consulted who recommended optimizing geriatric pain regimen including scheduled Tylenol, Robaxin, Lidoderm patch and preventing constipation. On the day of discharge 7/13/2023, her blood pressure was elevated but improved after receiving her amlodipine. She will be discharged on increased dose of amlodipine 10 mg daily. She should follow-up with her PCP and palliative care doctor to optimize pain regimen and avoid opioids to control her pain. DISCHARGE INFORMATION     Physical Exam on Day of Discharge:  General: Frail.   No apparent distress, resting comfortably   Head: Normocephalic, atraumatic  Eyes: Anicteric, no conjunctival erythema  ENT: External ear normal, no nasal discharge  Neck: Trachea midline, no visible lymphadenopathy or goiter  Respiratory: Non-labored respirations, symmetric thorax expansion  Cardiovascular: Extremities appear well-perfused  Abdomen: Non-distended  Extremities: Moves extremities spontaneously, no peripheral edema  Skin: No visible rashes, wounds, or jaundice  Neuro: A&O x 2 (to person and place, not time), no gross focal deficits, no aphasia     PCP at Discharge: Merlin Mills MD    Admitting Provider: Merlin Mills MD  Admission Date: 7/11/2023    Discharge Provider: Merlin Mills MD   Discharge Date: 7/13/2023    Discharge Disposition: Home with Home Health Care  Discharge Condition: good  Discharge with Lines: no    Discharge Diet: regular diet  Activity Restrictions: none  Test Results Pending at Discharge: Methylmalonic acid    Discharge Diagnoses:  Principal Problem:    Altered mental status, unspecified  Active Problems:    Essential hypertension    GERD (gastroesophageal reflux disease)    Chronic idiopathic constipation    Anxiety and depression  Resolved Problems:    * No resolved hospital problems. *      Consulting Providers:      Diagnostic & Therapeutic Procedures Performed:  CT head without contrast    Result Date: 7/11/2023  Impression: No acute intracranial abnormality. Workstation performed: [id number]       Code Status: Level 3 - DNAR and DNI  Advance Directive & Living Will: <no information>  Power of :    POLST:      Medications:  Current Discharge Medication List      STOP taking these medications       oxyCODONE (ROXICODONE) 5 immediate release tablet Comments:   Reason for Stopping:             Current Discharge Medication List      START taking these medications    Details   methocarbamol (ROBAXIN) 500 mg tablet Take 0.5 tablets (250 mg total) by mouth every 8 (eight) hours for 5 days  Qty: 8 tablet, Refills: 0    Associated Diagnoses: Altered mental status           Current Discharge Medication List      CONTINUE these medications which have NOT CHANGED    Details   acetaminophen (TYLENOL) 325 mg tablet Take 2 tablets (650 mg total) by mouth every 6 (six) hours as needed for mild pain  Qty: 30 tablet, Refills: 0    Associated Diagnoses: Osteoarthritis of right hip      cyanocobalamin (CYANOCOBALAMIN) 100 MCG TABS Take 1.5 tablets (150 mcg total) by mouth daily  Qty: 45 tablet, Refills: 0    Associated Diagnoses: Vitamin B12 deficiency      Diclofenac Sodium (VOLTAREN) 1 % Apply 2 g topically 4 (four) times a day  Qty: 350 g, Refills: 0    Associated Diagnoses: Osteoarthritis of right hip      latanoprost (XALATAN) 0.005 % ophthalmic solution       lidocaine (LIDODERM) 5 % Apply 1 patch topically over 12 hours daily Remove & Discard patch within 12 hours or as directed by MD Do not start before January 23, 2023.   Qty: 30 patch, Refills: 0    Associated Diagnoses: Osteoarthritis of right hip      LORazepam (ATIVAN) 0.5 mg tablet Take 1 tablet (0.5 mg total) by mouth 2 (two) times a day for 15 days  Qty: 30 tablet, Refills: 0    Associated Diagnoses: Anxiety attack      metoprolol tartrate (LOPRESSOR) 25 mg tablet Take 1 tablet (25 mg total) by mouth 2 (two) times a day  Qty: 180 tablet, Refills: 2    Associated Diagnoses: Essential hypertension      omeprazole (PriLOSEC) 20 mg delayed release capsule Take 1 capsule (20 mg total) by mouth daily  Qty: 90 capsule, Refills: 2    Associated Diagnoses: Gastroesophageal reflux disease without esophagitis             Allergies:  No Known Allergies    FOLLOW-UP     PCP Outpatient Follow-up:  Horacio El MD    Consulting Providers Follow-up:  Palliative care    Active Issues Requiring Follow-up:   Pain control-see palliative care to optimize analgesic regimen    Discharge Statement:   I spent 30 minutes minutes discharging the patient. This time was spent on the day of discharge. I had direct contact with the patient on the day of discharge. Additional documentation is required if more than 30 minutes were spent on discharge. Portions of the record may have been created with voice recognition software. Occasional wrong word or "sound a like" substitutions may have occurred due to the inherent limitations of voice recognition software. Read the chart carefully and recognize, using context, where substitutions have occurred.     ==  [place], [facility]  Internal Medicine Resident PGY-3

## 2023-07-13 NOTE — PROGRESS NOTES
-- Patient:  -- MRN: [id number]  -- Aidin Request ID: [id number]  -- Level of care reserved: [address]  -- Partner Reserved: [facility] [place] , [place], [address] [facility] [phone]  -- Clinical needs requested:  -- Geography searched: [phone]  -- Start of Service:  -- Request sent: 12:57pm EDT on 7/12/2023 by Bri Gonzalez  -- Partner reserved: 12:40pm EDT on 7/13/2023 by Tony Walters  -- Choice list shared: 12:40pm EDT on 7/13/2023 by Tony Walters

## 2023-07-14 ENCOUNTER — TRANSITIONAL CARE MANAGEMENT (OUTPATIENT)
Dept: INTERNAL MEDICINE CLINIC | Facility: OTHER | Age: 88
End: 2023-07-14

## 2023-07-15 LAB — METHYLMALONATE SERPL-SCNC: 277 NMOL/L (ref 0–378)

## 2023-07-19 LAB — METHYLMALONATE SERPL-SCNC: 360 NMOL/L (ref 0–378)

## 2023-07-21 ENCOUNTER — OFFICE VISIT (OUTPATIENT)
Dept: INTERNAL MEDICINE CLINIC | Facility: CLINIC | Age: 88
End: 2023-07-21
Payer: MEDICARE

## 2023-07-21 VITALS
HEIGHT: 60 IN | HEART RATE: 52 BPM | TEMPERATURE: 98.8 F | SYSTOLIC BLOOD PRESSURE: 140 MMHG | WEIGHT: 104 LBS | DIASTOLIC BLOOD PRESSURE: 72 MMHG | RESPIRATION RATE: 98 BRPM | BODY MASS INDEX: 20.42 KG/M2

## 2023-07-21 DIAGNOSIS — M79.10 MUSCLE PAIN: Primary | ICD-10-CM

## 2023-07-21 DIAGNOSIS — E44.1 MILD PROTEIN-CALORIE MALNUTRITION (HCC): ICD-10-CM

## 2023-07-21 PROBLEM — G93.40 ENCEPHALOPATHY: Status: ACTIVE | Noted: 2023-07-11

## 2023-07-21 PROCEDURE — 99496 TRANSJ CARE MGMT HIGH F2F 7D: CPT | Performed by: INTERNAL MEDICINE

## 2023-07-21 NOTE — QUICK NOTE
DISCHARGE SUMMARY ADDENDUM:    * Encephalopathy  Assessment & Plan  78-year-old female presents via EMS with her daughters for evaluation of increased confusion, forgetfulness, decreased ADLs including dietary intake and personal hygiene. Symptoms have been occurring for approximately 1 week. About a month ago she had a UTI which had similar, milder symptoms. She has no discrete history of dementia. She lives with her daughter, Antionette Be, who is present in the ED. Her daughter is feeling overwhelmed with redirecting and providing care for her mother at this point. In the ED, she told her daughters that she was seeing little people walking around. No known psychiatric history. No recent travel or sick contacts. Workup:  · TSH is decreased with a normal T4  · CMP and CBC unremarkable   · UA is not concerning for infection at this time  · B12 294   · Mag & Phos nl  · Urine tox neg  · PT/OT D/c HHC  · Geriatrics consult complete  · CTH identified decreased attenuation is noted in periventricular and subcortical white matter demonstrating an appearance that is statistically most likely to represent moderate microangiopathic change and ventricles and extra-axial csf spaces consistent with the degree of volume loss; no acute intracranial processes identified    Assessment: Encephalopathy, likely due to opioids, as evidenced by visual hallucinations, treated with brain imaging, labs, geriatrics c/s and reducing opioid pain meds.       Plan:  - Patient is stable for discharge home on 7/13/2023 with home health care  - Follow-up methylmalonic acid; consider starting vitamin B12 in the outpatient setting  - Continue holding home opioids in the setting of AMS, as this is presumably the most likely etiology of clinical presentation  - Patient currently denies pain, agree w/ recommended geriatrics pain protocol:  · acetaminophen 975 mg po Q 8 goal  · robaxin 250 mg po TID for pain  · Lidoderm patch      Anxiety and depression  Assessment & Plan  The patient has been taking ativan 0.5 mg BID for 25-30 years per daughter. Her  passed away approximately 6 years ago and she deals with significant anxiety and depression. Plan:  -Continue ativan 0.5 mg  - Consider additional therapy for anxiety and depression as patient is not interested in therapy    Chronic idiopathic constipation  Assessment & Plan  She does not take colace or miralax at home, has bowel movements every 2-4 days. Plan:  - Bowel regimen in place as patient admits to having prolonged period without bowel movement    GERD (gastroesophageal reflux disease)  Assessment & Plan  Daughter states that the patient takes omeprazole at home    Plan:   - Will be on pantoprazole PO, instead of omeprazole due to formulary differences    Essential hypertension  Assessment & Plan  She has a history of hypertension and on admission 156/69.     Plans:  -Increase Norvasc to 10 mg daily; continue Lopressor 20 mg daily

## 2023-07-21 NOTE — PROGRESS NOTES
Assessment/Plan:    TCM after  Being  Hospitalized  For   Pain  In the hip. Diagnoses and all orders for this visit:    Muscle pain    Mild protein-calorie malnutrition ([address])          Subjective:      Patient ID: Earlene Anderson is a 81 y.o. female. HPI    The following portions of the patient's history were reviewed and updated as appropriate: allergies, current medications, past family history, past medical history, past social history, past surgical history, and problem list.    Review of Systems   Constitutional: Negative. HENT: Negative for dental problem, drooling, ear discharge and ear pain. Eyes: Negative for discharge, redness and itching. Respiratory: Negative for apnea, cough and wheezing. Cardiovascular: Negative for chest pain and palpitations. Gastrointestinal: Negative for abdominal pain, blood in stool, diarrhea and vomiting. Endocrine: Negative for polydipsia, polyphagia and polyuria. Genitourinary: Negative for decreased urine volume, dysuria and frequency. Musculoskeletal: Negative for arthralgias, myalgias and neck stiffness. Skin: Negative for pallor and wound. Allergic/Immunologic: Negative for environmental allergies and food allergies. Neurological: Negative for facial asymmetry, light-headedness, numbness and headaches. Hematological: Negative for adenopathy. Does not bruise/bleed easily. Psychiatric/Behavioral: Negative for agitation, behavioral problems and confusion. Objective:      /72 (BP Location: Left arm, Patient Position: Sitting, Cuff Size: Standard)   Pulse (!) 52   Temp 98.8 °F (37.1 °C) (Temporal)   Resp (!) 98   Ht 5' (1.524 m)   Wt 47.2 kg (104 lb)   BMI 20.31 kg/m²          Physical Exam  Constitutional:       Appearance: Normal appearance. HENT:      Head: Normocephalic.       Nose: Nose normal.      Mouth/Throat:      Mouth: Mucous membranes are moist.   Eyes:      Pupils: Pupils are equal, round, and reactive to light. Cardiovascular:      Rate and Rhythm: Regular rhythm. Heart sounds: Normal heart sounds. Pulmonary:      Breath sounds: Normal breath sounds. Abdominal:      Palpations: Abdomen is soft. Musculoskeletal:         General: No swelling. Cervical back: Neck supple. Skin:     General: Skin is warm. Neurological:      General: No focal deficit present. Mental Status: She is alert and oriented to person, place, and time. Psychiatric:         Mood and Affect: Mood normal.       Gait  Dysfunction . Requires  A  Walker. Less hip  Pain. Blood pressure  Controlled with     Amlodipine. Fup 3 months.        Tresa Sosa MD.FACP

## 2023-07-24 DIAGNOSIS — R41.82 ALTERED MENTAL STATUS: ICD-10-CM

## 2023-07-24 RX ORDER — METHOCARBAMOL 500 MG/1
250 TABLET, FILM COATED ORAL EVERY 8 HOURS SCHEDULED
Qty: 8 TABLET | Refills: 0 | Status: SHIPPED | OUTPATIENT
Start: 2023-07-24 | End: 2023-07-31 | Stop reason: SDUPTHER

## 2023-07-24 NOTE — TELEPHONE ENCOUNTER
Pt's daughter says the robaxin was never sent to pharmacy, she is asking if you can send.  She was seen on friday

## 2023-07-31 DIAGNOSIS — R41.82 ALTERED MENTAL STATUS: ICD-10-CM

## 2023-07-31 RX ORDER — METHOCARBAMOL 500 MG/1
250 TABLET, FILM COATED ORAL EVERY 8 HOURS SCHEDULED
Qty: 8 TABLET | Refills: 0 | Status: SHIPPED | OUTPATIENT
Start: 2023-07-31 | End: 2023-08-11 | Stop reason: SDUPTHER

## 2023-08-11 DIAGNOSIS — R41.82 ALTERED MENTAL STATUS: ICD-10-CM

## 2023-08-11 RX ORDER — METHOCARBAMOL 500 MG/1
250 TABLET, FILM COATED ORAL EVERY 8 HOURS SCHEDULED
Qty: 8 TABLET | Refills: 0 | Status: SHIPPED | OUTPATIENT
Start: 2023-08-11 | End: 2023-08-16

## 2023-10-13 DIAGNOSIS — K21.9 GASTROESOPHAGEAL REFLUX DISEASE WITHOUT ESOPHAGITIS: ICD-10-CM

## 2023-10-13 DIAGNOSIS — I10 ESSENTIAL HYPERTENSION: ICD-10-CM

## 2023-10-13 RX ORDER — OMEPRAZOLE 20 MG/1
20 CAPSULE, DELAYED RELEASE ORAL DAILY
Qty: 90 CAPSULE | Refills: 2 | Status: SHIPPED | OUTPATIENT
Start: 2023-10-13

## 2023-10-20 ENCOUNTER — RA CDI HCC (OUTPATIENT)
Dept: OTHER | Facility: HOSPITAL | Age: 88
End: 2023-10-20

## 2023-11-13 DIAGNOSIS — R41.82 ALTERED MENTAL STATUS: ICD-10-CM

## 2023-11-13 RX ORDER — AMLODIPINE BESYLATE 10 MG/1
10 TABLET ORAL DAILY
Qty: 90 TABLET | Refills: 0 | Status: SHIPPED | OUTPATIENT
Start: 2023-11-13 | End: 2024-02-11

## 2023-11-22 ENCOUNTER — HOSPITAL ENCOUNTER (EMERGENCY)
Facility: HOSPITAL | Age: 88
Discharge: HOME | End: 2023-11-22
Attending: EMERGENCY MEDICINE
Payer: MEDICARE

## 2023-11-22 VITALS
HEART RATE: 74 BPM | DIASTOLIC BLOOD PRESSURE: 67 MMHG | TEMPERATURE: 98.4 F | RESPIRATION RATE: 20 BRPM | SYSTOLIC BLOOD PRESSURE: 154 MMHG | OXYGEN SATURATION: 96 %

## 2023-11-22 DIAGNOSIS — G93.40 ENCEPHALOPATHY: Primary | ICD-10-CM

## 2023-11-22 DIAGNOSIS — G31.84 MILD COGNITIVE IMPAIRMENT: ICD-10-CM

## 2023-11-22 LAB
ALBUMIN SERPL BCP-MCNC: 4.3 G/DL (ref 3.5–5)
ALP SERPL-CCNC: 77 U/L (ref 34–104)
ALT SERPL W P-5'-P-CCNC: 9 U/L (ref 7–52)
AMORPH URATE CRY URNS QL MICRO: ABNORMAL
ANION GAP SERPL CALCULATED.3IONS-SCNC: 10 MMOL/L
AST SERPL W P-5'-P-CCNC: 13 U/L (ref 13–39)
BACTERIA UR QL AUTO: ABNORMAL /HPF
BASOPHILS # BLD AUTO: 0.04 THOUSANDS/ÂΜL (ref 0–0.1)
BASOPHILS NFR BLD AUTO: 1 % (ref 0–1)
BILIRUB SERPL-MCNC: 0.54 MG/DL (ref 0.2–1)
BILIRUB UR QL STRIP: NEGATIVE
BUN SERPL-MCNC: 16 MG/DL (ref 5–25)
CALCIUM SERPL-MCNC: 9.8 MG/DL (ref 8.4–10.2)
CHLORIDE SERPL-SCNC: 102 MMOL/L (ref 96–108)
CLARITY UR: CLEAR
CO2 SERPL-SCNC: 28 MMOL/L (ref 21–32)
COLOR UR: YELLOW
CREAT SERPL-MCNC: 0.94 MG/DL (ref 0.6–1.3)
EOSINOPHIL # BLD AUTO: 0.1 THOUSAND/ÂΜL (ref 0–0.61)
EOSINOPHIL NFR BLD AUTO: 2 % (ref 0–6)
ERYTHROCYTE [DISTWIDTH] IN BLOOD BY AUTOMATED COUNT: 12.3 % (ref 11.6–15.1)
GFR SERPL CREATININE-BSD FRML MDRD: 54 ML/MIN/1.73SQ M
GLUCOSE SERPL-MCNC: 150 MG/DL (ref 65–140)
GLUCOSE UR STRIP-MCNC: NEGATIVE MG/DL
HCT VFR BLD AUTO: 39 % (ref 34.8–46.1)
HGB BLD-MCNC: 13.9 G/DL (ref 11.5–15.4)
HGB UR QL STRIP.AUTO: NEGATIVE
HYALINE CASTS #/AREA URNS LPF: ABNORMAL /LPF
IMM GRANULOCYTES # BLD AUTO: 0.03 THOUSAND/UL (ref 0–0.2)
IMM GRANULOCYTES NFR BLD AUTO: 0 % (ref 0–2)
KETONES UR STRIP-MCNC: NEGATIVE MG/DL
LEUKOCYTE ESTERASE UR QL STRIP: ABNORMAL
LYMPHOCYTES # BLD AUTO: 1.33 THOUSANDS/ÂΜL (ref 0.6–4.47)
LYMPHOCYTES NFR BLD AUTO: 19 % (ref 14–44)
MCH RBC QN AUTO: 33.1 PG (ref 26.8–34.3)
MCHC RBC AUTO-ENTMCNC: 35.6 G/DL (ref 31.4–37.4)
MCV RBC AUTO: 93 FL (ref 82–98)
MONOCYTES # BLD AUTO: 0.4 THOUSAND/ÂΜL (ref 0.17–1.22)
MONOCYTES NFR BLD AUTO: 6 % (ref 4–12)
MUCOUS THREADS UR QL AUTO: ABNORMAL
NEUTROPHILS # BLD AUTO: 4.95 THOUSANDS/ÂΜL (ref 1.85–7.62)
NEUTS SEG NFR BLD AUTO: 72 % (ref 43–75)
NITRITE UR QL STRIP: NEGATIVE
NON-SQ EPI CELLS URNS QL MICRO: ABNORMAL /HPF
NRBC BLD AUTO-RTO: 0 /100 WBCS
PH UR STRIP.AUTO: 5.5 [PH]
PLATELET # BLD AUTO: 228 THOUSANDS/UL (ref 149–390)
PMV BLD AUTO: 10.2 FL (ref 8.9–12.7)
POTASSIUM SERPL-SCNC: 3.9 MMOL/L (ref 3.5–5.3)
PROT SERPL-MCNC: 6.9 G/DL (ref 6.4–8.4)
PROT UR STRIP-MCNC: ABNORMAL MG/DL
RBC # BLD AUTO: 4.2 MILLION/UL (ref 3.81–5.12)
RBC #/AREA URNS AUTO: ABNORMAL /HPF
SODIUM SERPL-SCNC: 140 MMOL/L (ref 135–147)
SP GR UR STRIP.AUTO: 1.02 (ref 1–1.03)
UROBILINOGEN UR STRIP-ACNC: 2 MG/DL
WBC # BLD AUTO: 6.85 THOUSAND/UL (ref 4.31–10.16)
WBC #/AREA URNS AUTO: ABNORMAL /HPF
WBC CASTS URNS QL MICRO: ABNORMAL /LPF

## 2023-11-22 PROCEDURE — 81001 URINALYSIS AUTO W/SCOPE: CPT

## 2023-11-22 PROCEDURE — 99285 EMERGENCY DEPT VISIT HI MDM: CPT

## 2023-11-22 PROCEDURE — 85025 COMPLETE CBC W/AUTO DIFF WBC: CPT

## 2023-11-22 PROCEDURE — 36415 COLL VENOUS BLD VENIPUNCTURE: CPT

## 2023-11-22 PROCEDURE — 80053 COMPREHEN METABOLIC PANEL: CPT

## 2023-11-22 PROCEDURE — 99285 EMERGENCY DEPT VISIT HI MDM: CPT | Performed by: EMERGENCY MEDICINE

## 2023-11-22 PROCEDURE — 93005 ELECTROCARDIOGRAM TRACING: CPT

## 2023-11-22 NOTE — ED ATTENDING ATTESTATION
11/22/2023  Ashley Eng DO, saw and evaluated the patient. I have discussed the patient with the resident/non-physician practitioner and agree with the resident's/non-physician practitioner's findings, Plan of Care, and MDM as documented in the resident's/non-physician practitioner's note, except where noted. All available labs and Radiology studies were reviewed. I was present for key portions of any procedure(s) performed by the resident/non-physician practitioner and I was immediately available to provide assistance. At this point I agree with the current assessment done in the Emergency Department. I have conducted an independent evaluation of this patient a history and physical is as follows:    67-year-old female presents with her daughters for evaluation of worsening visual hallucinations that are noncommand, not harmful. Patient sees cats, little girls and/or dolls and does ask her daughters to help make them go away. Symptoms have been ongoing for few months. She was previously admitted for these changes and did not have any organic findings, including on CT head. She lives with one of her daughters and is safe there. The daughters are looking to transition her to another daughter's home. None of them report any recent trauma, infectious symptoms, changes in her medications or other acute problems. Her daughter feels that she does not drink enough fluids as evidenced by dry, chapped lips. Patient has had no recent complaints of dysuria. No recent travel or sick contacts. ROS: Denies (and no report of) f/c, HA, cough, lightheadedness/dizziness, CP, SOB, abdominal pain, n/v/d. 12 system ROS o/w negative.     PE: NAD, appears comfortable, alert, pleasant; PERRL, EOMI; MMM, no posterior oropharyngeal exudate, edema or erythema; HRR, no murmur, monitor shows sinus rhythm at 74 bpm; lungs CTA w/o w/r/r, POx 97% on RA (nl); abdomen s/nt/nd, nl BS in all 4 quadrant; (-) LE edema or calf TTP, FROM extremities x4; skin p/w/d; CNs GI/NF, oriented to self and place but confused to time and events. MDM/DDx: Acute on chronic delirium - progressive dementia, polypharmacy, occult infection such as UTI, abnormal electrolytes, organ dysfunction such as acute renal failure, less likely stroke or malignancy. I independently reviewed and interpreted ordered labs from this encounter. A/P: Will check basic labs, urine, EKG, treat symptoms, reevaluate for further work up and disposition. Patient will likely benefit from the assistance of case management for home services and/or future skilled nursing facility placement.     ED Course         Critical Care Time  Procedures

## 2023-11-22 NOTE — ED PROVIDER NOTES
History  Chief Complaint   Patient presents with    Hallucinations     Pt was brought in by family. Family states she was hallucinating since last night. She saw 4 little girls in her room last night but they ran away when she stood up. She stated she has a hx of hallucinations but they usually don't last this long. Patient is an 82 yo female with PMH of anxiety, depression, hypertension, chronic right hip pain, and GERD who presents for evaluation of hallucinations. Patient is accompanied by her two adult daughters, who states this has been ongoing for several months and she was admitted to the hospital in July for this issue. Patient initially unable to communicate why she is in the ED; believes it is because she was having chest discomfort yesterday. However, when redirected by her daughters, she is able to communicate that she intermittently has visual hallucinations; sees cats in her home that aren't hers and 3-4 little girls in the home. She says they do not communicate with her. These hallucinations are not distressing to her, however, she often asks family members to remove them from the home. She denies any self harm, SI or HI. Family members endorse that this is true. Patient does not have a great appetite at baseline, but eats small meals throughout the day. Lives with her other daughter who is not present in ED. Patient's daughter manages her medications. Sisters state that she is well cared for and has a home health aid come into the house several times a month. However, they are concerned that she requires more care due to change in cognitive function. Patient denying any physical symptoms at this time, including SOB, chest pain, nausea, vomiting, fever, chills, diarrhea, constipation, or dysuria. Prior to Admission Medications   Prescriptions Last Dose Informant Patient Reported? Taking?    Diclofenac Sodium (VOLTAREN) 1 %  Self, Child No No   Sig: Apply 2 g topically 4 (four) times a day   Patient not taking: Reported on 7/21/2023   LORazepam (ATIVAN) 0.5 mg tablet   No No   Sig: Take 1 tablet (0.5 mg total) by mouth 2 (two) times a day for 15 days   acetaminophen (TYLENOL) 325 mg tablet  Self, Child No No   Sig: Take 2 tablets (650 mg total) by mouth every 6 (six) hours as needed for mild pain   amLODIPine (NORVASC) 10 mg tablet   No No   Sig: Take 1 tablet (10 mg total) by mouth daily   cyanocobalamin (CYANOCOBALAMIN) 100 MCG TABS  Child No No   Sig: Take 1.5 tablets (150 mcg total) by mouth daily   latanoprost (XALATAN) 0.005 % ophthalmic solution  Child, Self Yes No   lidocaine (LIDODERM) 5 %  Self, Child No No   Sig: Apply 1 patch topically over 12 hours daily Remove & Discard patch within 12 hours or as directed by MD Do not start before January 23, 2023.    Patient not taking: Reported on 7/21/2023   methocarbamol (ROBAXIN) 500 mg tablet   No No   Sig: Take 0.5 tablets (250 mg total) by mouth every 8 (eight) hours for 5 days   metoprolol tartrate (LOPRESSOR) 25 mg tablet   No No   Sig: Take 1 tablet (25 mg total) by mouth 2 (two) times a day   omeprazole (PriLOSEC) 20 mg delayed release capsule   No No   Sig: Take 1 capsule (20 mg total) by mouth daily   polyethylene glycol (MIRALAX) 17 g packet  Self, Child No No   Sig: Take 17 g by mouth as needed (constipation) for up to 10 doses   Patient not taking: Reported on 7/21/2023      Facility-Administered Medications: None       Past Medical History:   Diagnosis Date    Allergic rhinitis     Anxiety     Diverticulitis of large intestine without perforation or abscess without bleeding     Dysthymic disorder     Gastro-esophageal reflux disease without esophagitis     HLD (hyperlipidemia)     Hypertension     Osteopenia     Palpitations     Paresthesia     Stroke [facility])        Past Surgical History:   Procedure Laterality Date    APPENDECTOMY      CHOLECYSTECTOMY      COLON SURGERY      FL INJECTION RIGHT HIP (NON ARTHROGRAM)  8/28/2019 HYSTERECTOMY         Family History   Problem Relation Age of Onset    Hypertension Mother     Hypertension Father      I have reviewed and agree with the history as documented. E-Cigarette/Vaping    E-Cigarette Use Never User      E-Cigarette/Vaping Substances    Nicotine No     THC No     CBD No     Flavoring No     Other No     Unknown No      Social History     Tobacco Use    Smoking status: Former    Smokeless tobacco: Never   Vaping Use    Vaping Use: Never used   Substance Use Topics    Alcohol use: Not Currently    Drug use: Not Currently        Review of Systems   Constitutional:  Positive for appetite change (poor appetite at baseline). Negative for fatigue and fever. HENT:  Negative for congestion. Respiratory:  Negative for shortness of breath. Cardiovascular:  Negative for chest pain. Gastrointestinal:  Negative for abdominal pain, constipation, diarrhea, nausea and vomiting. Genitourinary:  Negative for dysuria. Musculoskeletal:  Negative for gait problem (walks with walker at baseline). Skin:  Negative for wound. Neurological:  Negative for speech difficulty and numbness. Psychiatric/Behavioral:  Positive for hallucinations (visual). Negative for suicidal ideas. The patient is not nervous/anxious.          Increased moodiness at home per daughters          Physical Exam  ED Triage Vitals   Temperature Pulse Respirations Blood Pressure SpO2   11/22/23 1548 11/22/23 1548 11/22/23 1548 11/22/23 1548 11/22/23 1548   98.4 °F (36.9 °C) 72 19 142/75 96 %      Temp Source Heart Rate Source Patient Position - Orthostatic VS BP Location FiO2 (%)   11/22/23 1548 11/22/23 1654 11/22/23 1548 11/22/23 1548 --   Oral Monitor Sitting Right arm       Pain Score       11/22/23 1548       No Pain             Orthostatic Vital Signs  Vitals:    11/22/23 1548 11/22/23 1654 11/22/23 1900   BP: 142/75 168/72 154/67   Pulse: 72 76 74   Patient Position - Orthostatic VS: Sitting Sitting Sitting Physical Exam  Constitutional:       General: She is not in acute distress. Appearance: Normal appearance. She is not ill-appearing, toxic-appearing or diaphoretic. HENT:      Head: Normocephalic and atraumatic. Nose: Nose normal.      Mouth/Throat:      Mouth: Mucous membranes are moist.      Pharynx: Oropharynx is clear. No oropharyngeal exudate or posterior oropharyngeal erythema. Eyes:      Extraocular Movements: Extraocular movements intact. Conjunctiva/sclera: Conjunctivae normal.      Pupils: Pupils are equal, round, and reactive to light. Cardiovascular:      Rate and Rhythm: Normal rate and regular rhythm. Pulses: Normal pulses. Heart sounds: Normal heart sounds. Pulmonary:      Effort: Pulmonary effort is normal.      Breath sounds: Normal breath sounds. Abdominal:      General: Abdomen is flat. Palpations: Abdomen is soft. Tenderness: There is no abdominal tenderness. Musculoskeletal:         General: No swelling or tenderness. Normal range of motion. Cervical back: Normal range of motion and neck supple. Skin:     General: Skin is warm and dry. Capillary Refill: Capillary refill takes less than 2 seconds. Neurological:      Mental Status: She is alert and oriented to person, place, and time. Cranial Nerves: No cranial nerve deficit. Sensory: No sensory deficit. Motor: No weakness. Psychiatric:         Attention and Perception: Attention normal. She perceives visual hallucinations. Mood and Affect: Mood normal.         Behavior: Behavior normal.         Thought Content: Thought content is not paranoid. Thought content does not include homicidal or suicidal ideation. Thought content does not include homicidal or suicidal plan. Cognition and Memory: Cognition is impaired. She exhibits impaired recent memory.          Judgment: Judgment normal.      Comments: Patient endorsing visual hallucinations of cats and little girls at home, not currently seeing in the ED. Patient initially unable to recall why she was brought to the ED, but able to tell history in her own words after daughters reminded her.           ED Medications  Medications - No data to display    Diagnostic Studies  Results Reviewed       Procedure Component Value Units Date/Time    Urine Microscopic [[id number]]  (Abnormal) Collected: 11/22/23 1814    Lab Status: Final result Specimen: Urine, Other Updated: 11/22/23 1831     RBC, UA None Seen /hpf      WBC, UA 1-2 /hpf      Epithelial Cells None Seen /hpf      Bacteria, UA None Seen /hpf      MUCUS THREADS Occasional     Hyaline Casts, UA 5-10 /lpf      WBC Casts, UA 0-3 /lpf      Amorphous Crystals, UA Occasional    UA w Reflex to Microscopic w Reflex to Culture [[id number]]  (Abnormal) Collected: 11/22/23 1814    Lab Status: Final result Specimen: Urine, Other Updated: 11/22/23 1824     Color, UA Yellow     Clarity, UA Clear     Specific Gravity, UA 1.020     pH, UA 5.5     Leukocytes, UA Trace     Nitrite, UA Negative     Protein, UA Trace mg/dl      Glucose, UA Negative mg/dl      Ketones, UA Negative mg/dl      Urobilinogen, UA 2.0 mg/dl      Bilirubin, UA Negative     Occult Blood, UA Negative    Comprehensive metabolic panel [[id number]]  (Abnormal) Collected: 11/22/23 1642    Lab Status: Final result Specimen: Blood from Arm, Left Updated: 11/22/23 1706     Sodium 140 mmol/L      Potassium 3.9 mmol/L      Chloride 102 mmol/L      CO2 28 mmol/L      ANION GAP 10 mmol/L      BUN 16 mg/dL      Creatinine 0.94 mg/dL      Glucose 150 mg/dL      Calcium 9.8 mg/dL      AST 13 U/L      ALT 9 U/L      Alkaline Phosphatase 77 U/L      Total Protein 6.9 g/dL      Albumin 4.3 g/dL      Total Bilirubin 0.54 mg/dL      eGFR 54 ml/min/1.73sq m     Narrative:      [place] guidelines for Chronic Kidney Disease (CKD):     Stage 1 with normal or high GFR (GFR > 90 mL/min/1.73 square meters) Stage 2 Mild CKD (GFR = 60-89 mL/min/1.73 square meters)    Stage 3A Moderate CKD (GFR = 45-59 mL/min/1.73 square meters)    Stage 3B Moderate CKD (GFR = 30-44 mL/min/1.73 square meters)    Stage 4 Severe CKD (GFR = 15-29 mL/min/1.73 square meters)    Stage 5 End Stage CKD (GFR <15 mL/min/1.73 square meters)  Note: GFR calculation is accurate only with a steady state creatinine    CBC and differential [[id number]] Collected: 11/22/23 1642    Lab Status: Final result Specimen: Blood from Arm, Left Updated: 11/22/23 1650     WBC 6.85 Thousand/uL      RBC 4.20 Million/uL      Hemoglobin 13.9 g/dL      Hematocrit 39.0 %      MCV 93 fL      MCH 33.1 pg      MCHC 35.6 g/dL      RDW 12.3 %      MPV 10.2 fL      Platelets 291 Thousands/uL      nRBC 0 /100 WBCs      Neutrophils Relative 72 %      Immat GRANS % 0 %      Lymphocytes Relative 19 %      Monocytes Relative 6 %      Eosinophils Relative 2 %      Basophils Relative 1 %      Neutrophils Absolute 4.95 Thousands/µL      Immature Grans Absolute 0.03 Thousand/uL      Lymphocytes Absolute 1.33 Thousands/µL      Monocytes Absolute 0.40 Thousand/µL      Eosinophils Absolute 0.10 Thousand/µL      Basophils Absolute 0.04 Thousands/µL                    No orders to display         Procedures  ECG 12 Lead Documentation Only    Date/Time: 11/22/2023 11:05 PM    Performed by: Martinez Nuñez MD  Authorized by: Martinez Nuñez MD    Indications / Diagnosis:  Lightheadedness  ECG reviewed by me, the ED Provider: yes    Patient location:  ED  Previous ECG:     Previous ECG:  Compared to current    Similarity:  Changes noted  Interpretation:     Interpretation: normal    Rate:     ECG rate:  70    ECG rate assessment: normal    Rhythm:     Rhythm: sinus rhythm    Ectopy:     Ectopy: none    QRS:     QRS axis:  Normal  Conduction:     Conduction: normal    ST segments:     ST segments:  Normal  T waves:     T waves: normal          ED Course Medical Decision Making  July Pena is a 81 y.o. who presents for evaluation of visual hallucinations. Vital signs are stable, afebrile  PE: WNL, not actively responding to internal stimuli. Denying SI/HI  MMSE score of 24/30    Ddx: delirium 2/2 metabolic causes vs. Polypharmacy vs. Infection vs. Age-related cognitive changes    Plan:   CBC, CMP WNL  Patient felt lightheaded upon standing to go to the bathroom to provide urine sample. Patient seated, repeat vitals stable. EKG: NSR without ectopy  Patient hungry and requested meal from [employer]. Tolerated meal without nausea/vomiting. Reassessment: Patient medically stable. Not complaining of any physical symptoms. Mood and affect unchanged. Daughters comfortable taking patient home but requesting home resources to assess further care needs due to cognitive changes. Disposition: Patient stable for discharge home. Return precautions provided. Recommend follow up with PCP and referral to CM provided. Patient and daughters understand and are agreeable to plan. Amount and/or Complexity of Data Reviewed  Labs: ordered. Disposition  Final diagnoses:   Encephalopathy   Mild cognitive impairment     Time reflects when diagnosis was documented in both MDM as applicable and the Disposition within this note       Time User Action Codes Description Comment    11/22/2023  7:11 PM [address] [G93.40] Encephalopathy     11/22/2023  7:11 PM Juliano Davalos [employer] [facility] [G31.84] Mild cognitive impairment           ED Disposition       ED Disposition   Discharge    Condition   Stable    Date/Time   Wed Nov 22, 2023  7:14 PM    [address] [facility] discharge to home/self care.                    Follow-up Information       Follow up With Specialties Details Why Contact Info    Carla Hernandez MD Internal Medicine Go in 1 week  [address]  [phone]              Discharge Medication List as of 11/22/2023  7:16 PM        CONTINUE these medications which have NOT CHANGED    Details   acetaminophen (TYLENOL) 325 mg tablet Take 2 tablets (650 mg total) by mouth every 6 (six) hours as needed for mild pain, Starting Sun 1/22/2023, Normal      amLODIPine (NORVASC) 10 mg tablet Take 1 tablet (10 mg total) by mouth daily, Starting Mon 11/13/2023, Until Sun 2/11/2024, Normal      cyanocobalamin (CYANOCOBALAMIN) 100 MCG TABS Take 1.5 tablets (150 mcg total) by mouth daily, Starting Fri 6/3/2022, Until Fri 7/21/2023, Normal      Diclofenac Sodium (VOLTAREN) 1 % Apply 2 g topically 4 (four) times a day, Starting Sun 1/22/2023, Normal      latanoprost (XALATAN) 0.005 % ophthalmic solution Starting Mon 2/8/2021, Historical Med      lidocaine (LIDODERM) 5 % Apply 1 patch topically over 12 hours daily Remove & Discard patch within 12 hours or as directed by MD Do not start before January 23, 2023., Starting Mon 1/23/2023, Normal      LORazepam (ATIVAN) 0.5 mg tablet Take 1 tablet (0.5 mg total) by mouth 2 (two) times a day for 15 days, Starting Fri 4/21/2023, Until Fri 7/21/2023, Normal      methocarbamol (ROBAXIN) 500 mg tablet Take 0.5 tablets (250 mg total) by mouth every 8 (eight) hours for 5 days, Starting Fri 8/11/2023, Until Wed 8/16/2023, Normal      metoprolol tartrate (LOPRESSOR) 25 mg tablet Take 1 tablet (25 mg total) by mouth 2 (two) times a day, Starting Fri 10/13/2023, Normal      omeprazole (PriLOSEC) 20 mg delayed release capsule Take 1 capsule (20 mg total) by mouth daily, Starting Fri 10/13/2023, Normal      polyethylene glycol (MIRALAX) 17 g packet Take 17 g by mouth as needed (constipation) for up to 10 doses, Starting Thu 7/13/2023, Normal               PDMP Review         Value Time User    PDMP Reviewed  Yes 7/12/2023 11:18 AM Tuesday Tejas Davis, [address] [place] [address]             ED Provider  Attending physically available and evaluated Argentina Bravo.  I managed the patient along with the ED Attending.     Electronically Signed by           Estella Duong MD  11/22/23 5115

## 2023-11-23 LAB
ATRIAL RATE: 70 BPM
P AXIS: 75 DEGREES
PR INTERVAL: 166 MS
QRS AXIS: 33 DEGREES
QRSD INTERVAL: 80 MS
QT INTERVAL: 380 MS
QTC INTERVAL: 410 MS
T WAVE AXIS: 44 DEGREES
VENTRICULAR RATE: 70 BPM

## 2023-11-23 PROCEDURE — 93010 ELECTROCARDIOGRAM REPORT: CPT | Performed by: INTERNAL MEDICINE

## 2023-11-23 NOTE — DISCHARGE INSTRUCTIONS
Please return to the ED if patient experiences acutely altered mental status or thoughts of self harm or harming others. Recommend follow up within Dr. Aide Gruber to discuss persistent mild cognitive impairment. Case management referral has been provided to help assess patient's home needs.

## 2023-11-30 ENCOUNTER — PATIENT OUTREACH (OUTPATIENT)
Dept: INTERNAL MEDICINE CLINIC | Facility: CLINIC | Age: 88
End: 2023-11-30

## 2023-11-30 NOTE — PROGRESS NOTES
Referral received from patient's recent ED for Outpatient Social Work Care Manager ([facility]) to outreach patient and assess needs. Spoke with patient's dtr Almaz Tommy (on consent form). Patient has lived with Almaz Sanders for the past 3 years. Almaz Sanders quit her job to care for patient. Patient's  passed away 6 years ago; he was not a [place]. Almaz Sanders is patient's caregiver and assists with daily needs. Almaz Sanders states patient is declining physically and cognitively (she is not eating like she used to, has hip pain, sits in her chair during the day, does not shower like she should, and is not interested in PT). Almaz Sanders feels they need to explore possible placement at a [address] after the holidays. Offered to discuss in-home assistance (aides) or meal services in the meantime to help Almaz Sanders but she declined at this time. Patient current with [facility]; RN comes ever 6 weeks unless patient needs sooner appt. Amlaz Sanders denied any additional needs at this time. Encouraged her to outreach in the new year if assistance is needed with placement. Amanda agreed.

## 2023-12-12 ENCOUNTER — HOSPITAL ENCOUNTER (EMERGENCY)
Facility: HOSPITAL | Age: 88
Discharge: HOME | End: 2023-12-12
Attending: EMERGENCY MEDICINE
Payer: MEDICARE

## 2023-12-12 ENCOUNTER — APPOINTMENT (EMERGENCY)
Dept: RADIOLOGY | Facility: HOSPITAL | Age: 88
End: 2023-12-12
Payer: MEDICARE

## 2023-12-12 VITALS
RESPIRATION RATE: 18 BRPM | TEMPERATURE: 97.8 F | HEART RATE: 77 BPM | OXYGEN SATURATION: 96 % | SYSTOLIC BLOOD PRESSURE: 173 MMHG | DIASTOLIC BLOOD PRESSURE: 97 MMHG
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
## 2023-12-12 DIAGNOSIS — S01.01XA LACERATION OF SCALP, INITIAL ENCOUNTER: ICD-10-CM

## 2023-12-12 LAB
ANION GAP SERPL CALCULATED.3IONS-SCNC: 10 MMOL/L
ATRIAL RATE: 81 BPM
BASOPHILS # BLD AUTO: 0.06 THOUSANDS/ÂΜL (ref 0–0.1)
BASOPHILS NFR BLD AUTO: 1 % (ref 0–1)
BUN SERPL-MCNC: 15 MG/DL (ref 5–25)
CALCIUM SERPL-MCNC: 9.9 MG/DL (ref 8.4–10.2)
CARDIAC TROPONIN I PNL SERPL HS: 5 NG/L
CHLORIDE SERPL-SCNC: 102 MMOL/L (ref 96–108)
CO2 SERPL-SCNC: 25 MMOL/L (ref 21–32)
CREAT SERPL-MCNC: 0.75 MG/DL (ref 0.6–1.3)
EOSINOPHIL # BLD AUTO: 0.11 THOUSAND/ÂΜL (ref 0–0.61)
EOSINOPHIL NFR BLD AUTO: 1 % (ref 0–6)
ERYTHROCYTE [DISTWIDTH] IN BLOOD BY AUTOMATED COUNT: 12.2 % (ref 11.6–15.1)
GFR SERPL CREATININE-BSD FRML MDRD: 71 ML/MIN/1.73SQ M
GLUCOSE SERPL-MCNC: 96 MG/DL (ref 65–140)
HCT VFR BLD AUTO: 43.3 % (ref 34.8–46.1)
HGB BLD-MCNC: 15.5 G/DL (ref 11.5–15.4)
IMM GRANULOCYTES # BLD AUTO: 0.03 THOUSAND/UL (ref 0–0.2)
IMM GRANULOCYTES NFR BLD AUTO: 0 % (ref 0–2)
LYMPHOCYTES # BLD AUTO: 1.13 THOUSANDS/ÂΜL (ref 0.6–4.47)
LYMPHOCYTES NFR BLD AUTO: 12 % (ref 14–44)
MCH RBC QN AUTO: 32.8 PG (ref 26.8–34.3)
MCHC RBC AUTO-ENTMCNC: 35.8 G/DL (ref 31.4–37.4)
MCV RBC AUTO: 92 FL (ref 82–98)
MONOCYTES # BLD AUTO: 0.41 THOUSAND/ÂΜL (ref 0.17–1.22)
MONOCYTES NFR BLD AUTO: 4 % (ref 4–12)
NEUTROPHILS # BLD AUTO: 7.77 THOUSANDS/ÂΜL (ref 1.85–7.62)
NEUTS SEG NFR BLD AUTO: 82 % (ref 43–75)
NRBC BLD AUTO-RTO: 0 /100 WBCS
P AXIS: 74 DEGREES
PLATELET # BLD AUTO: 240 THOUSANDS/UL (ref 149–390)
PMV BLD AUTO: 10.1 FL (ref 8.9–12.7)
POTASSIUM SERPL-SCNC: 3.7 MMOL/L (ref 3.5–5.3)
PR INTERVAL: 180 MS
QRS AXIS: -4 DEGREES
QRSD INTERVAL: 76 MS
QT INTERVAL: 346 MS
QTC INTERVAL: 401 MS
RBC # BLD AUTO: 4.72 MILLION/UL (ref 3.81–5.12)
SODIUM SERPL-SCNC: 137 MMOL/L (ref 135–147)
T WAVE AXIS: 28 DEGREES
VENTRICULAR RATE: 81 BPM
WBC # BLD AUTO: 9.51 THOUSAND/UL (ref 4.31–10.16)

## 2023-12-12 PROCEDURE — 80048 BASIC METABOLIC PNL TOTAL CA: CPT

## 2023-12-12 PROCEDURE — 72125 CT NECK SPINE W/O DYE: CPT

## 2023-12-12 PROCEDURE — 36415 COLL VENOUS BLD VENIPUNCTURE: CPT

## 2023-12-12 PROCEDURE — 90715 TDAP VACCINE 7 YRS/> IM: CPT

## 2023-12-12 PROCEDURE — 93005 ELECTROCARDIOGRAM TRACING: CPT

## 2023-12-12 PROCEDURE — 70450 CT HEAD/BRAIN W/O DYE: CPT

## 2023-12-12 PROCEDURE — 99285 EMERGENCY DEPT VISIT HI MDM: CPT | Performed by: EMERGENCY MEDICINE

## 2023-12-12 PROCEDURE — 12002 RPR S/N/AX/GEN/TRNK2.6-7.5CM: CPT | Performed by: EMERGENCY MEDICINE

## 2023-12-12 PROCEDURE — 90471 IMMUNIZATION ADMIN: CPT

## 2023-12-12 PROCEDURE — 71045 X-RAY EXAM CHEST 1 VIEW: CPT

## 2023-12-12 PROCEDURE — G1004 CDSM NDSC: HCPCS

## 2023-12-12 PROCEDURE — 99285 EMERGENCY DEPT VISIT HI MDM: CPT

## 2023-12-12 PROCEDURE — 85025 COMPLETE CBC W/AUTO DIFF WBC: CPT

## 2023-12-12 PROCEDURE — 84484 ASSAY OF TROPONIN QUANT: CPT

## 2023-12-12 RX ORDER — ACETAMINOPHEN 325 MG/1
975 TABLET ORAL ONCE
Status: COMPLETED | OUTPATIENT
Start: 2023-12-12 | End: 2023-12-12

## 2023-12-12 RX ADMIN — TETANUS TOXOID, REDUCED DIPHTHERIA TOXOID AND ACELLULAR PERTUSSIS VACCINE, ADSORBED 0.5 ML: 5; 2.5; 8; 8; 2.5 SUSPENSION INTRAMUSCULAR at 14:49

## 2023-12-12 RX ADMIN — ACETAMINOPHEN 975 MG: 325 TABLET, FILM COATED ORAL at 15:32

## 2023-12-12 NOTE — ED ATTENDING ATTESTATION
12/12/2023  I, Óscar Pack, , saw and evaluated the patient. I have discussed the patient with the resident/non-physician practitioner and agree with the resident's/non-physician practitioner's findings, Plan of Care, and MDM as documented in the resident's/non-physician practitioner's note, except where noted. All available labs and Radiology studies were reviewed. I was present for key portions of any procedure(s) performed by the resident/non-physician practitioner and I was immediately available to provide assistance. At this point I agree with the current assessment done in the Emergency Department. I have conducted an independent evaluation of this patient a history and physical is as follows:    81-year-old female presents status post fall. Patient states prior to arrival she was holding a cat in her left arm, went to stand up to use her walker and lost her balance and fell. Patient struck her head but did not lose consciousness. Was able to call for help and able to get up on her own. Patient had bleeding from her head so they called EMS. Patient does not take any blood thinners or antiplatelet medication. Denies recent illness. Denies preceding symptoms, no dizziness, did not feel lightheaded, no chest pain or shortness of breath. On exam-no acute distress, heart regular, no respiratory distress, no midline spinal tenderness, abdomen soft and nontender. Plan-CT head and C-spine, chest x-ray, suspect mechanical fall.   Later patient stated she did have some dizziness this morning so we will do cardiac labs as well and reevaluate    ED Course         Critical Care Time  Procedures

## 2023-12-12 NOTE — DISCHARGE INSTRUCTIONS
Your staples need to be removed in 7-10 days, please go to PCP or return to ED for this. If you develop new or worsening symptoms, please return to the Emergency Department for further evaluation.

## 2023-12-12 NOTE — ED PROVIDER NOTES
History  Chief Complaint   Patient presents with    Fall     C/o of fall from standing, no LOC +HS -thinners      HPI    Patient is an 71-year-old female past medical history of hip pain, anxiety, CVA presenting with acute fall. Patient states prior to arrival she was holding a cat in her left arm, went to stand up to use her walker and lost her balance and fell. She struck her head but did not lose consciousness. She was able to call for help and her daughter was able to assist her up. She is having bleeding from her head and they decided to call EMS. Patient denies any anticoagulation or antiplatelet medications. Patient denies any recent fever, chills, chest pain, shortness of breath, nausea, vomiting. She says this morning she did feel slightly dizzy but this has been intermittent problem and she does not feel this contributed to her fall. Prior to Admission Medications   Prescriptions Last Dose Informant Patient Reported? Taking? Diclofenac Sodium (VOLTAREN) 1 %  Self, Child No No   Sig: Apply 2 g topically 4 (four) times a day   Patient not taking: Reported on 7/21/2023   LORazepam (ATIVAN) 0.5 mg tablet   No No   Sig: Take 1 tablet (0.5 mg total) by mouth 2 (two) times a day for 15 days   acetaminophen (TYLENOL) 325 mg tablet  Self, Child No No   Sig: Take 2 tablets (650 mg total) by mouth every 6 (six) hours as needed for mild pain   amLODIPine (NORVASC) 10 mg tablet   No No   Sig: Take 1 tablet (10 mg total) by mouth daily   cyanocobalamin (CYANOCOBALAMIN) 100 MCG TABS  Child No No   Sig: Take 1.5 tablets (150 mcg total) by mouth daily   latanoprost (XALATAN) 0.005 % ophthalmic solution  Child, Self Yes No   lidocaine (LIDODERM) 5 %  Self, Child No No   Sig: Apply 1 patch topically over 12 hours daily Remove & Discard patch within 12 hours or as directed by MD Do not start before January 23, 2023.    Patient not taking: Reported on 7/21/2023   methocarbamol (ROBAXIN) 500 mg tablet   No No   Sig: Take 0.5 tablets (250 mg total) by mouth every 8 (eight) hours for 5 days   metoprolol tartrate (LOPRESSOR) 25 mg tablet   No No   Sig: Take 1 tablet (25 mg total) by mouth 2 (two) times a day   omeprazole (PriLOSEC) 20 mg delayed release capsule   No No   Sig: Take 1 capsule (20 mg total) by mouth daily   polyethylene glycol (MIRALAX) 17 g packet  Self, Child No No   Sig: Take 17 g by mouth as needed (constipation) for up to 10 doses   Patient not taking: Reported on 7/21/2023      Facility-Administered Medications: None       Past Medical History:   Diagnosis Date    Allergic rhinitis     Anxiety     Diverticulitis of large intestine without perforation or abscess without bleeding     Dysthymic disorder     Gastro-esophageal reflux disease without esophagitis     HLD (hyperlipidemia)     Hypertension     Osteopenia     Palpitations     Paresthesia     Stroke [facility])        Past Surgical History:   Procedure Laterality Date    APPENDECTOMY      CHOLECYSTECTOMY      COLON SURGERY      FL INJECTION RIGHT HIP (NON ARTHROGRAM)  8/28/2019    HYSTERECTOMY         Family History   Problem Relation Age of Onset    Hypertension Mother     Hypertension Father      I have reviewed and agree with the history as documented. E-Cigarette/Vaping    E-Cigarette Use Never User      E-Cigarette/Vaping Substances    Nicotine No     THC No     CBD No     Flavoring No     Other No     Unknown No      Social History     Tobacco Use    Smoking status: Former    Smokeless tobacco: Never   Vaping Use    Vaping Use: Never used   Substance Use Topics    Alcohol use: Not Currently    Drug use: Not Currently        Review of Systems   Constitutional:  Negative for chills and fever. HENT:  Negative for ear pain and sore throat. Eyes:  Negative for pain and visual disturbance. Respiratory:  Negative for cough and shortness of breath. Cardiovascular:  Negative for chest pain and palpitations.    Gastrointestinal:  Negative for abdominal pain and vomiting. Genitourinary:  Negative for dysuria and hematuria. Musculoskeletal:  Negative for arthralgias and back pain. Skin:  Negative for color change and rash. Neurological:  Positive for dizziness. Negative for seizures and syncope. All other systems reviewed and are negative. Physical Exam  ED Triage Vitals [12/12/23 1403]   Temperature Pulse Respirations Blood Pressure SpO2   97.8 °F (36.6 °C) 77 18 (!) 173/97 96 %      Temp Source Heart Rate Source Patient Position - Orthostatic VS BP Location FiO2 (%)   Oral Monitor -- -- --      Pain Score       5             Orthostatic Vital Signs  Vitals:    12/12/23 1403   BP: (!) 173/97   Pulse: 77       Physical Exam  Vitals and nursing note reviewed. Constitutional:       General: She is not in acute distress. HENT:      Head: Normocephalic. Comments: 6 cm curvilinear scalp laceration to the right parietal scalp     Right Ear: External ear normal.      Left Ear: External ear normal.      Nose: Nose normal.      Mouth/Throat:      Pharynx: Oropharynx is clear. Eyes:      Extraocular Movements: Extraocular movements intact. Pupils: Pupils are equal, round, and reactive to light. Cardiovascular:      Rate and Rhythm: Normal rate and regular rhythm. Pulses: Normal pulses. Heart sounds: Normal heart sounds. No murmur heard. No friction rub. No gallop. Pulmonary:      Effort: Pulmonary effort is normal. No respiratory distress. Breath sounds: Normal breath sounds. No wheezing, rhonchi or rales. Abdominal:      General: Abdomen is flat. There is no distension. Palpations: Abdomen is soft. Tenderness: There is no abdominal tenderness. There is no guarding or rebound. Musculoskeletal:         General: No deformity. Normal range of motion. Cervical back: Normal range of motion. Right lower leg: No edema. Left lower leg: No edema. Skin:     General: Skin is warm and dry.       Capillary Refill: Capillary refill takes less than 2 seconds. Findings: No rash. Neurological:      General: No focal deficit present. Mental Status: She is alert. Mental status is at baseline.    Psychiatric:         Mood and Affect: Mood normal.         ED Medications  Medications   tetanus-diphtheria-acellular pertussis (BOOSTRIX) IM injection 0.5 mL (0.5 mL Intramuscular Given 12/12/23 1449)   acetaminophen (TYLENOL) tablet 975 mg (975 mg Oral Given 12/12/23 1532)       Diagnostic Studies  Results Reviewed       Procedure Component Value Units Date/Time    HS Troponin 0hr (reflex protocol) [[id number]]  (Normal) Collected: 12/12/23 1449    Lab Status: Final result Specimen: Blood from Arm, Left Updated: 12/12/23 1525     hs TnI 0hr 5 ng/L     Basic metabolic panel [[id number]] Collected: 12/12/23 1449    Lab Status: Final result Specimen: Blood from Arm, Left Updated: 12/12/23 1519     Sodium 137 mmol/L      Potassium 3.7 mmol/L      Chloride 102 mmol/L      CO2 25 mmol/L      ANION GAP 10 mmol/L      BUN 15 mg/dL      Creatinine 0.75 mg/dL      Glucose 96 mg/dL      Calcium 9.9 mg/dL      eGFR 71 ml/min/1.73sq m     Narrative:      [facility] guidelines for Chronic Kidney Disease (CKD):     Stage 1 with normal or high GFR (GFR > 90 mL/min/1.73 square meters)    Stage 2 Mild CKD (GFR = 60-89 mL/min/1.73 square meters)    Stage 3A Moderate CKD (GFR = 45-59 mL/min/1.73 square meters)    Stage 3B Moderate CKD (GFR = 30-44 mL/min/1.73 square meters)    Stage 4 Severe CKD (GFR = 15-29 mL/min/1.73 square meters)    Stage 5 End Stage CKD (GFR <15 mL/min/1.73 square meters)  Note: GFR calculation is accurate only with a steady state creatinine    CBC and differential [[id number]]  (Abnormal) Collected: 12/12/23 1449    Lab Status: Final result Specimen: Blood from Arm, Left Updated: 12/12/23 1500     WBC 9.51 Thousand/uL      RBC 4.72 Million/uL      Hemoglobin 15.5 g/dL      Hematocrit 43.3 % MCV 92 fL      MCH 32.8 pg      MCHC 35.8 g/dL      RDW 12.2 %      MPV 10.1 fL      Platelets 818 Thousands/uL      nRBC 0 /100 WBCs      Neutrophils Relative 82 %      Immat GRANS % 0 %      Lymphocytes Relative 12 %      Monocytes Relative 4 %      Eosinophils Relative 1 %      Basophils Relative 1 %      Neutrophils Absolute 7.77 Thousands/µL      Immature Grans Absolute 0.03 Thousand/uL      Lymphocytes Absolute 1.13 Thousands/µL      Monocytes Absolute 0.41 Thousand/µL      Eosinophils Absolute 0.11 Thousand/µL      Basophils Absolute 0.06 Thousands/µL                    CT head without contrast   Final Result by Adri Lopez MD (12/12 1615)      No acute intracranial abnormality. Right parietal scalp soft tissue swelling with underlying small hematoma. Chronic microangiopathic ischemic changes. Workstation performed: [id number]         CT spine cervical without contrast   Final Result by Adri Lopez MD (12/12 1611)      No cervical spine fracture or traumatic malalignment. Stable grade 1 degenerative anterolisthesis at the C3-4 level. Diffuse disc osteophyte complexes are again noted at the C5-6 and C6-7 levels with mild central canal narrowing. Multinodular thyroid goiter. Stable focal groundglass opacity within the left upper lobe, unchanged since 2019. Workstation performed: [id number]         XR chest 1 view portable   ED Interpretation by Yunior Corral MD (12/12 2849)   No acute cardiopulmonary findings, no acute osseous abnormalities per my interpretation             Procedures  Universal Protocol:  Consent: Verbal consent obtained. Risks and benefits: risks, benefits and alternatives were discussed  Consent given by: patient  Time out: Immediately prior to procedure a "time out" was called to verify the correct patient, procedure, equipment, support staff and site/side marked as required.   Timeout called at: 12/12/2023 3:02 PM.  Patient understanding: patient states understanding of the procedure being performed  Patient consent: the patient's understanding of the procedure matches consent given  Procedure consent: procedure consent matches procedure scheduled  Patient identity confirmed: verbally with patient and arm band  Laceration repair    Date/Time: 12/12/2023 3:02 PM    Performed by: Reese Graves MD  Authorized by: Reese Graves MD  Body area: head/neck  Location details: scalp  Laceration length: 6 cm      Procedure Details:  Irrigation solution: saline  Irrigation method: jet lavage  Amount of cleaning: extensive  Debridement: none  Degree of undermining: none  Skin closure: staples  Dressing: gauze roll and 4x4 sterile gauze  Patient tolerance: patient tolerated the procedure well with no immediate complications  Comments: 7 staples            ED Course  ED Course as of 12/12/23 1904   Tue Dec 12, 2023   1431 Pt now stating she felt dizzy earlier, doesn't think it contributed to fall, but pt and daughter concerned, will obtain cardiac eval in addition to CT imaging   1453 ECG 12 lead  Procedure Note: EKG  Date/Time: 12/12/23 2:53 PM   Interpreted by: Miko Sampson MD  Indications / Diagnosis: eval arrhythmia  ECG reviewed by me, the ED Provider: yes   The EKG demonstrates:  Rhythm: normal sinus with 1 pvc  Intervals: normal intervals  Axis: normal axis  QRS/Blocks: normal QRS  ST Changes: No acute ST Changes, no STD/JULIO. SBIRT 22yo+      Flowsheet Row Most Recent Value   Initial Alcohol Screen: US AUDIT-C     1. How often do you have a drink containing alcohol? 0 Filed at: 12/12/2023 1411   2. How many drinks containing alcohol do you have on a typical day you are drinking? 0 Filed at: 12/12/2023 1411   3a. Male UNDER 65: How often do you have five or more drinks on one occasion? 0 Filed at: 12/12/2023 1411   3b. FEMALE Any Age, or MALE 65+:  How often do you have 4 or more drinks on one occassion? 0 Filed at: 12/12/2023 1411   Audit-C Score 0 Filed at: 12/12/2023 1411   JACKLYN: How many times in the past year have you. .. Used an illegal drug or used a prescription medication for non-medical reasons? Never Filed at: 12/12/2023 1411                  Medical Decision Making  27-year-old female presenting with fall with resultant head injury. Vital signs are stable. Her exam is significant for a scalp laceration with bleeding controlled. Repaired with staples at bedside. Plan for CT head and C-spine. Will obtain cardiac w/u with history of dizziness to r/o arrhythmia/cardiac event leading to fall. Cardiac workup within normal limits. Tetanus updated. CT imaging unremarkable, updated the family as to the multi nodular goiter finding on the CT and recommended following with the PCP within the next month. Wound care instructions provided. Recommended staple removal in 7 to 10 days. All questions were answered and patient and daughter is comfortable with discharge plan. Amount and/or Complexity of Data Reviewed  Labs: ordered. Radiology: ordered and independent interpretation performed. ECG/medicine tests:  Decision-making details documented in ED Course. Risk  OTC drugs. Prescription drug management. Disposition  Final diagnoses:   Fall, initial encounter   Laceration of scalp, initial encounter     Time reflects when diagnosis was documented in both MDM as applicable and the Disposition within this note       Time User Action Codes Description Comment    12/12/2023  4:17 PM Gina Herron Add [Q55. SWGV] Fall, initial encounter     12/12/2023  4:17 PM Gina Herron Add [S01.01XA] Laceration of scalp, initial encounter           ED Disposition       ED Disposition   Discharge    Condition   Stable    Date/Time   Tue Dec 12, 2023 2415 [place] discharge to home/self care.                    Follow-up Information       Follow up With Specialties Details Why Contact Info Additional [address] [facility] Emergency Department Emergency Medicine  For suture removal [address] [id number]  [facility] Emergency Department, [address] [facility], [place], [place], [facility]            Discharge Medication List as of 12/12/2023  4:21 PM        CONTINUE these medications which have NOT CHANGED    Details   acetaminophen (TYLENOL) 325 mg tablet Take 2 tablets (650 mg total) by mouth every 6 (six) hours as needed for mild pain, Starting Sun 1/22/2023, Normal      amLODIPine (NORVASC) 10 mg tablet Take 1 tablet (10 mg total) by mouth daily, Starting Mon 11/13/2023, Until Sun 2/11/2024, Normal      cyanocobalamin (CYANOCOBALAMIN) 100 MCG TABS Take 1.5 tablets (150 mcg total) by mouth daily, Starting Fri 6/3/2022, Until Fri 7/21/2023, Normal      Diclofenac Sodium (VOLTAREN) 1 % Apply 2 g topically 4 (four) times a day, Starting Sun 1/22/2023, Normal      latanoprost (XALATAN) 0.005 % ophthalmic solution Starting Mon 2/8/2021, Historical Med      lidocaine (LIDODERM) 5 % Apply 1 patch topically over 12 hours daily Remove & Discard patch within 12 hours or as directed by MD Do not start before January 23, 2023., Starting Mon 1/23/2023, Normal      LORazepam (ATIVAN) 0.5 mg tablet Take 1 tablet (0.5 mg total) by mouth 2 (two) times a day for 15 days, Starting Fri 4/21/2023, Until Fri 7/21/2023, Normal      methocarbamol (ROBAXIN) 500 mg tablet Take 0.5 tablets (250 mg total) by mouth every 8 (eight) hours for 5 days, Starting Fri 8/11/2023, Until Wed 8/16/2023, Normal      metoprolol tartrate (LOPRESSOR) 25 mg tablet Take 1 tablet (25 mg total) by mouth 2 (two) times a day, Starting Fri 10/13/2023, Normal      omeprazole (PriLOSEC) 20 mg delayed release capsule Take 1 capsule (20 mg total) by mouth daily, Starting Fri 10/13/2023, Normal      polyethylene glycol (MIRALAX) 17 g packet Take 17 g by mouth as needed (constipation) for up to 10 doses, Starting Thu 7/13/2023, Normal           No discharge procedures on file. PDMP Review         Value Time User    PDMP Reviewed  Yes 7/12/2023 11:18 AM Tuesday [facility], [address] [place] [address]             ED Provider  Attending physically available and evaluated Taj Johnson. I managed the patient along with the ED Attending.     Electronically Signed by           Ngozi Escamilla MD  12/12/23 3936

## 2023-12-22 ENCOUNTER — OFFICE VISIT (OUTPATIENT)
Dept: INTERNAL MEDICINE CLINIC | Facility: CLINIC | Age: 88
End: 2023-12-22
Payer: MEDICARE

## 2023-12-22 VITALS
TEMPERATURE: 98.2 F | SYSTOLIC BLOOD PRESSURE: 120 MMHG | DIASTOLIC BLOOD PRESSURE: 60 MMHG | OXYGEN SATURATION: 99 % | HEART RATE: 65 BPM

## 2023-12-22 DIAGNOSIS — I10 ESSENTIAL HYPERTENSION: Primary | ICD-10-CM

## 2023-12-22 DIAGNOSIS — R26.2 AMBULATORY DYSFUNCTION: ICD-10-CM

## 2023-12-22 DIAGNOSIS — Z00.00 MEDICARE ANNUAL WELLNESS VISIT, SUBSEQUENT: ICD-10-CM

## 2023-12-22 DIAGNOSIS — G31.84 MILD COGNITIVE IMPAIRMENT: ICD-10-CM

## 2023-12-22 DIAGNOSIS — S09.90XD INJURY OF HEAD, SUBSEQUENT ENCOUNTER: ICD-10-CM

## 2023-12-22 PROCEDURE — 99213 OFFICE O/P EST LOW 20 MIN: CPT | Performed by: INTERNAL MEDICINE

## 2023-12-22 PROCEDURE — G0439 PPPS, SUBSEQ VISIT: HCPCS | Performed by: INTERNAL MEDICINE

## 2023-12-22 NOTE — PROGRESS NOTES
Assessment and Plan:     Problem List Items Addressed This Visit          Cardiovascular and Mediastinum    Essential hypertension - Primary       Other    Mild cognitive impairment    Ambulatory dysfunction     Other Visit Diagnoses       Injury of head, subsequent encounter        Staples were removed.    Medicare annual wellness visit, subsequent                 1. Essential hypertension      Stable continue amlodipine metoprolol      2. Injury of head, subsequent encounter      Staples were removed.      3. Mild cognitive impairment        4. Medicare annual wellness visit, subsequent        5. Ambulatory dysfunction      She remains at high risk for fall.  Using assistive device and PT discussed         Preventive health issues were discussed with patient, and age appropriate screening tests were ordered as noted in patient's After Visit Summary.  Personalized health advice and appropriate referrals for health education or preventive services given if needed, as noted in patient's After Visit Summary.     History of Present Illness:     Patient presents for a Medicare Wellness Visit    HPI   Patient Care Team:  Bashir Kimbrough MD as PCP - General (Internal Medicine)  Dionicio Borjas MD as PCP - Attending     Review of Systems:     Review of Systems   Constitutional:  Negative for appetite change, chills, fatigue and fever.   HENT:  Negative for sore throat and trouble swallowing.    Eyes:  Negative for redness.   Respiratory:  Negative for shortness of breath.    Cardiovascular:  Negative for chest pain and palpitations.   Gastrointestinal:  Negative for abdominal pain, constipation and diarrhea.   Genitourinary:  Negative for dysuria and hematuria.   Musculoskeletal:  Positive for arthralgias and gait problem. Negative for back pain and neck pain.   Skin:  Positive for wound. Negative for rash.   Neurological:  Negative for seizures, weakness and headaches.   Hematological:  Negative for adenopathy.    Psychiatric/Behavioral:  Negative for confusion. The patient is not nervous/anxious.         Problem List:     Patient Active Problem List   Diagnosis    Dizziness    Chest tightness    Essential hypertension    GERD (gastroesophageal reflux disease)    Chronic idiopathic constipation    Perianal lesion    Word finding difficulty    Mild cognitive impairment    Diverticulitis large intestine w/o perforation or abscess w/o bleeding    Small vessel stroke (HCC)    Mild protein-calorie malnutrition (HCC)    Vitamin B12 deficiency    Anxiety and depression    Ambulatory dysfunction    Encephalopathy      Past Medical and Surgical History:     Past Medical History:   Diagnosis Date    Allergic rhinitis     Anxiety     Diverticulitis of large intestine without perforation or abscess without bleeding     Dysthymic disorder     Gastro-esophageal reflux disease without esophagitis     HLD (hyperlipidemia)     Hypertension     Osteopenia     Palpitations     Paresthesia     Stroke (HCC)      Past Surgical History:   Procedure Laterality Date    APPENDECTOMY      CHOLECYSTECTOMY      COLON SURGERY      FL INJECTION RIGHT HIP (NON ARTHROGRAM)  8/28/2019    HYSTERECTOMY        Family History:     Family History   Problem Relation Age of Onset    Hypertension Mother     Hypertension Father       Social History:     Social History     Socioeconomic History    Marital status:      Spouse name: None    Number of children: None    Years of education: None    Highest education level: None   Occupational History    None   Tobacco Use    Smoking status: Former    Smokeless tobacco: Never   Vaping Use    Vaping status: Never Used   Substance and Sexual Activity    Alcohol use: Not Currently    Drug use: Not Currently    Sexual activity: Not Currently   Other Topics Concern    None   Social History Narrative    Smoking: Unknown if ever smoked    Pets: None    As per [employer]     Social Determinants of Health     Financial  Resource Strain: Low Risk  (12/22/2023)    Overall Financial Resource Strain (CARDIA)     Difficulty of Paying Living Expenses: Not hard at all   Food Insecurity: No Food Insecurity (7/12/2023)    Hunger Vital Sign     Worried About Running Out of Food in the Last Year: Never true     Ran Out of Food in the Last Year: Never true   Transportation Needs: No Transportation Needs (12/22/2023)    PRAPARE - Transportation     Lack of Transportation (Medical): No     Lack of Transportation (Non-Medical): No   Physical Activity: Not on file   Stress: Not on file   Social Connections: Not on file   Intimate Partner Violence: Not on file   Housing Stability: Low Risk  (7/12/2023)    Housing Stability Vital Sign     Unable to Pay for Housing in the Last Year: No     Number of Places Lived in the Last Year: 1     Unstable Housing in the Last Year: No      Medications and Allergies:     Current Outpatient Medications   Medication Sig Dispense Refill    acetaminophen (TYLENOL) 325 mg tablet Take 2 tablets (650 mg total) by mouth every 6 (six) hours as needed for mild pain 30 tablet 0    amLODIPine (NORVASC) 10 mg tablet Take 1 tablet (10 mg total) by mouth daily 90 tablet 0    latanoprost (XALATAN) 0.005 % ophthalmic solution       LORazepam (ATIVAN) 0.5 mg tablet Take 1 tablet (0.5 mg total) by mouth 2 (two) times a day for 15 days 30 tablet 0    methocarbamol (ROBAXIN) 500 mg tablet Take 0.5 tablets (250 mg total) by mouth every 8 (eight) hours for 5 days 8 tablet 0    metoprolol tartrate (LOPRESSOR) 25 mg tablet Take 1 tablet (25 mg total) by mouth 2 (two) times a day 180 tablet 2    omeprazole (PriLOSEC) 20 mg delayed release capsule Take 1 capsule (20 mg total) by mouth daily 90 capsule 2    cyanocobalamin (CYANOCOBALAMIN) 100 MCG TABS Take 1.5 tablets (150 mcg total) by mouth daily 45 tablet 0    Diclofenac Sodium (VOLTAREN) 1 % Apply 2 g topically 4 (four) times a day (Patient not taking: Reported on 7/21/2023) 350 g 0     lidocaine (LIDODERM) 5 % Apply 1 patch topically over 12 hours daily Remove & Discard patch within 12 hours or as directed by MD Do not start before January 23, 2023. (Patient not taking: Reported on 7/21/2023) 30 patch 0    polyethylene glycol (MIRALAX) 17 g packet Take 17 g by mouth as needed (constipation) for up to 10 doses (Patient not taking: Reported on 7/21/2023) 17 g 0     No current facility-administered medications for this visit.     No Known Allergies   Immunizations:     Immunization History   Administered Date(s) Administered    COVID-19 PFIZER VACCINE 0.3 ML IM 01/27/2021, 02/19/2021    Influenza, high dose seasonal 0.7 mL 10/27/2020, 12/03/2021, 09/23/2022    Pneumococcal Conjugate 13-Valent 09/11/2015    Pneumococcal Polysaccharide PPV23 01/30/2003    Td (adult), Unspecified 04/19/2011    Td (adult), adsorbed 04/19/2011    Tdap 12/12/2023    Zoster 11/17/2010      Health Maintenance:         Topic Date Due    Colorectal Cancer Screening  07/21/2024 (Originally 1/10/1980)         Topic Date Due    COVID-19 Vaccine (3 - 2023-24 season) 09/01/2023      Medicare Screening Tests and Risk Assessments:     Last Medicare Wellness visit information reviewed, patient interviewed and updates made to the record today.      Health Risk Assessment:   Patient rates overall health as poor. Patient feels that their physical health rating is much worse. Patient is dissatisfied with their life. Eyesight was rated as much worse. Hearing was rated as much worse. Patient feels that their emotional and mental health rating is much worse. Patients states they are sometimes angry. Patient states they are always unusually tired/fatigued. Pain experienced in the last 7 days has been some. Patient's pain rating has been 5/10. Patient states that she has experienced no weight loss or gain in last 6 months.     Depression Screening:   PHQ-9 Score: 8      Fall Risk Screening:   In the past year, patient has experienced: history  of falling in past year    Number of falls: 2 or more  Injured during fall?: Yes    Feels unsteady when standing or walking?: Yes    Worried about falling?: Yes      Urinary Incontinence Screening:   Patient has leaked urine accidently in the last six months.     Home Safety:  Patient has trouble with stairs inside or outside of their home. Patient has working smoke alarms and has working carbon monoxide detector. Home safety hazards include: none.     Nutrition:   Current diet is Frequent junk food.     Medications:   Patient is currently taking over-the-counter supplements. OTC medications include: see medication list. Patient is able to manage medications.     Activities of Daily Living (ADLs)/Instrumental Activities of Daily Living (IADLs):   Walk and transfer into and out of bed and chair?: Yes  Dress and groom yourself?: Yes    Bathe or shower yourself?: Yes    Feed yourself? Yes  Do your laundry/housekeeping?: No  Manage your money, pay your bills and track your expenses?: No  Make your own meals?: No    Do your own shopping?: No    Previous Hospitalizations:   Any hospitalizations or ED visits within the last 12 months?: Yes    How many hospitalizations have you had in the last year?: 1-2    Advance Care Planning:   Living will: Yes    Durable POA for healthcare: Yes    Advanced directive: Yes    Five wishes given: No      PREVENTIVE SCREENINGS      Cardiovascular Screening:    General: Screening Current      Diabetes Screening:     General: Screening Current      Colorectal Cancer Screening:     General: Screening Not Indicated      Cervical Cancer Screening:    General: Screening Not Indicated      Lung Cancer Screening:     General: Screening Not Indicated    Screening, Brief Intervention, and Referral to Treatment (SBIRT)    Screening  Typical number of drinks in a day: 0  Typical number of drinks in a week: 0  Interpretation: Low risk drinking behavior.    Single Item Drug Screening:  How often have you  used an illegal drug (including marijuana) or a prescription medication for non-medical reasons in the past year? never    Single Item Drug Screen Score: 0  Interpretation: Negative screen for possible drug use disorder    Other Counseling Topics:   Car/seat belt/driving safety, skin self-exam, sunscreen and regular weightbearing exercise and calcium and vitamin D intake.     No results found.     Physical Exam:     /60 (BP Location: Left arm, Patient Position: Sitting, Cuff Size: Standard)   Pulse 65   Temp 98.2 °F (36.8 °C) (Temporal)   SpO2 99%     Physical Exam  Constitutional:       Appearance: Normal appearance.   HENT:      Head: Normocephalic and atraumatic.      Mouth/Throat:      Mouth: Mucous membranes are moist.   Eyes:      Extraocular Movements: Extraocular movements intact.      Conjunctiva/sclera: Conjunctivae normal.      Pupils: Pupils are equal, round, and reactive to light.   Cardiovascular:      Rate and Rhythm: Normal rate.   Pulmonary:      Effort: Pulmonary effort is normal.   Abdominal:      Palpations: Abdomen is soft.   Musculoskeletal:      Cervical back: Normal range of motion and neck supple.   Skin:     General: Skin is warm.      Comments: Staples were removed.   Neurological:      General: No focal deficit present.      Mental Status: She is alert and oriented to person, place, and time. Mental status is at baseline.      Gait: Gait abnormal.   Psychiatric:         Mood and Affect: Mood normal.         Behavior: Behavior normal.          Julio Hemphill MD